# Patient Record
Sex: FEMALE | Race: WHITE | Employment: OTHER | URBAN - NONMETROPOLITAN AREA
[De-identification: names, ages, dates, MRNs, and addresses within clinical notes are randomized per-mention and may not be internally consistent; named-entity substitution may affect disease eponyms.]

---

## 2020-08-14 ENCOUNTER — OFFICE VISIT (OUTPATIENT)
Dept: INTERNAL MEDICINE CLINIC | Age: 30
End: 2020-08-14
Payer: COMMERCIAL

## 2020-08-14 VITALS
WEIGHT: 125 LBS | HEIGHT: 67 IN | BODY MASS INDEX: 19.62 KG/M2 | SYSTOLIC BLOOD PRESSURE: 139 MMHG | TEMPERATURE: 97.5 F | DIASTOLIC BLOOD PRESSURE: 85 MMHG | HEART RATE: 82 BPM | RESPIRATION RATE: 12 BRPM

## 2020-08-14 PROBLEM — F17.200 TOBACCO USE DISORDER: Status: ACTIVE | Noted: 2020-08-14

## 2020-08-14 LAB
AMPHETAMINE SCREEN, URINE: ABNORMAL
BARBITURATE SCREEN, URINE: ABNORMAL
BENZODIAZEPINE SCREEN, URINE: ABNORMAL
BUPRENORPHINE URINE: ABNORMAL
COCAINE METABOLITE SCREEN URINE: ABNORMAL
GABAPENTIN SCREEN, URINE: ABNORMAL
MDMA URINE: ABNORMAL
METHADONE SCREEN, URINE: ABNORMAL
METHAMPHETAMINE, URINE: ABNORMAL
OPIATE SCREEN URINE: ABNORMAL
OXYCODONE SCREEN URINE: ABNORMAL
PHENCYCLIDINE SCREEN URINE: ABNORMAL
PROPOXYPHENE SCREEN, URINE: ABNORMAL
THC SCREEN, URINE: ABNORMAL
TRICYCLIC ANTIDEPRESSANTS, UR: ABNORMAL

## 2020-08-14 PROCEDURE — G8427 DOCREV CUR MEDS BY ELIG CLIN: HCPCS | Performed by: INTERNAL MEDICINE

## 2020-08-14 PROCEDURE — 80305 DRUG TEST PRSMV DIR OPT OBS: CPT | Performed by: INTERNAL MEDICINE

## 2020-08-14 PROCEDURE — G8420 CALC BMI NORM PARAMETERS: HCPCS | Performed by: INTERNAL MEDICINE

## 2020-08-14 PROCEDURE — 99204 OFFICE O/P NEW MOD 45 MIN: CPT | Performed by: INTERNAL MEDICINE

## 2020-08-14 PROCEDURE — 4004F PT TOBACCO SCREEN RCVD TLK: CPT | Performed by: INTERNAL MEDICINE

## 2020-08-14 RX ORDER — BUPRENORPHINE AND NALOXONE 8; 2 MG/1; MG/1
1 FILM, SOLUBLE BUCCAL; SUBLINGUAL 2 TIMES DAILY
Qty: 14 FILM | Refills: 0 | Status: SHIPPED | OUTPATIENT
Start: 2020-08-14 | End: 2020-08-21

## 2020-08-14 RX ORDER — BUPRENORPHINE AND NALOXONE 8; 2 MG/1; MG/1
FILM, SOLUBLE BUCCAL; SUBLINGUAL
COMMUNITY
Start: 2020-08-05 | End: 2020-08-26 | Stop reason: SDUPTHER

## 2020-08-14 RX ORDER — CLONIDINE HYDROCHLORIDE 0.1 MG/1
0.1 TABLET ORAL 2 TIMES DAILY
COMMUNITY
End: 2020-09-22 | Stop reason: SDUPTHER

## 2020-08-14 ASSESSMENT — PATIENT HEALTH QUESTIONNAIRE - PHQ9
SUM OF ALL RESPONSES TO PHQ QUESTIONS 1-9: 0
2. FEELING DOWN, DEPRESSED OR HOPELESS: 0
1. LITTLE INTEREST OR PLEASURE IN DOING THINGS: 0
SUM OF ALL RESPONSES TO PHQ QUESTIONS 1-9: 0
SUM OF ALL RESPONSES TO PHQ9 QUESTIONS 1 & 2: 0

## 2020-08-14 NOTE — PROGRESS NOTES
death, feels its resolved. No suicidal issues. No Known Allergies    Current Outpatient Medications   Medication Sig Dispense Refill    buprenorphine-naloxone (SUBOXONE) 8-2 MG FILM SL film        No current facility-administered medications for this visit. Social History     Socioeconomic History    Marital status:       Spouse name: Not on file    Number of children: Not on file    Years of education: Not on file    Highest education level: Not on file   Occupational History    Not on file   Social Needs    Financial resource strain: Not on file    Food insecurity     Worry: Not on file     Inability: Not on file    Transportation needs     Medical: Not on file     Non-medical: Not on file   Tobacco Use    Smoking status: Current Every Day Smoker     Packs/day: 0.25     Years: 1.00     Pack years: 0.25     Types: Cigarettes    Smokeless tobacco: Never Used   Substance and Sexual Activity    Alcohol use: Not Currently    Drug use: Not Currently    Sexual activity: Not Currently   Lifestyle    Physical activity     Days per week: Not on file     Minutes per session: Not on file    Stress: Not on file   Relationships    Social connections     Talks on phone: Not on file     Gets together: Not on file     Attends Voodoo service: Not on file     Active member of club or organization: Not on file     Attends meetings of clubs or organizations: Not on file     Relationship status: Not on file    Intimate partner violence     Fear of current or ex partner: Not on file     Emotionally abused: Not on file     Physically abused: Not on file     Forced sexual activity: Not on file   Other Topics Concern    Not on file   Social History Narrative    Not on file          Family History   Problem Relation Age of Onset    Seizures Father     Emphysema Maternal Grandmother     Cancer Paternal Grandmother     Heart Attack Paternal Grandfather         ROS       General: Patient denies fevers, chills ,weight changes, sweats     Psych: No depression, anxiety, suicidal ideation or attempts     Endocrine: No thyroid issues,no neck pain, no galactorrhea, no weight changes     Pulmonary: No shortness of breath, orthopnea, PND     Cardiac: No chest pain,syncope, no history of cardiac issues     GI: No trouble with bowels, no abdominal pain     : No dysuria, nocturia, urgency, frequency     MS: Patient denies bone or joint aches, no myalgias     Neuro: Patient denies headaches, seizures, tremors     Skin: No skin lesions, rashes    PHYSICAL EXAM       /85 (Site: Right Upper Arm, Position: Sitting, Cuff Size: Medium Adult)   Pulse 82   Temp 97.5 °F (36.4 °C) (Temporal)   Resp 12   Ht 5' 7\" (1.702 m)   Wt 125 lb (56.7 kg)   BMI 19.58 kg/m²         COWS  3       General: Patient resting comfortably in no acute distress, mildly anxious      Mental status: Alert and oriented to person place and time, no hallucinations or delusions     Eyes: Pupils are normal in size     Neck: Supple no JVD or bruits     Pulmonary: Good respiratory effort no wheezes rales or rhonchi     Cardiac: Normal S1 normal S2 no murmurs gallops or rubs     Abdomen nondistended nontender no organomegaly      Extremities: No cyanosis clubbing or edema     Neurologic: No focal neurologic deficit detected, no tremors     Musculoskeletal: No abnormal joint findings or muscle findings     Skin: No rashes, lesions or abnormalities noted        URINE DRUG SCREEN TODAY:    No results for input(s): LABAMPH, LABBARB, LABBENZ, BUPRENUR, COCAIMETSCRU, GABAPENTIN, MDMA, METAMPU, LABMETH, OPIATESCREENURINE, OXTCOSU, PHENCYCLIDINESCREENURINE, PROPOXYPHENE, THCSCREENUR, TRICYUR in the last 72 hours. DIAGNOSIS:    1. Severe Opiod use disorder  Last intake of suboxone 2 days ago, had 4 tabs of adderal from the past ,   Claims started taking them as she was getting anxious. Recommend she stops using the same, and use suboxone.   Has some clonidine   Dose uncertain , uses prn only given  Physician at [facility]. We will continue Suboxone 8 mg film twice daily. She was encouraged to get counseling,   Caroline Fountain will update her on the various programs available in the community. 2. Tobacco use disorder - uses one pack every 4 days. PLAN:      Provider provided education on Medication Assisted Treatment options, including: Suboxone, Sublocade, Methadone, and Naltrexone/Vivitrol  Allowed opportunity to respond to questions regarding the treatment options. Patient voices that their treatment preference is suboxone. I feel that this patient is an appropriate candidate for this treatment option, with above recommendations to stop the Adderall. Education given on the importance of combining Medication Assisted Treatment with comprehensive treatment, including: individual counseling, treatment groups, community support groups, and psychiatry as applicable. Patient will meet with  to review clinic counseling expectations and to be linked to appropriate services. Provider reviewed medication contract with patient. Patient is agreeable to the program expectations. Both patient and provider signed the medication contract. Patient instructed to go to [facility] to watch a video and learn about [facility]. I told patient [facility] is an opioid antagonist that reverses respiratory depression caused by opioids. Pharmacy will give patient or family member [facility] and explain how to use in an emergency. I reviewed the [place] Automated Rx Reporting System report     There does not appear to be any discrepancies or overprescribing of controlled substances. Claims she got some blood work at [facility] at [address].  We will obtain those labs, never had history of hepatitis C    Return to office in 1 week, recheck urine studies at that time

## 2020-08-14 NOTE — PROGRESS NOTES
Per Verbal order of  for urine drug screen. Patient is positive for AMP,BUP. Verified with results with Tyrone Anderson LPN.

## 2020-08-17 ENCOUNTER — TELEPHONE (OUTPATIENT)
Dept: INTERNAL MEDICINE CLINIC | Age: 30
End: 2020-08-17

## 2020-08-17 NOTE — TELEPHONE ENCOUNTER
Patient called and stated she is unable to get her RX filled. Patient had a appt with Dr. Catarino Diaz on Friday 08 13 2020. I gave request to Rach to please call [address] in  [facility].

## 2020-08-26 ENCOUNTER — OFFICE VISIT (OUTPATIENT)
Dept: INTERNAL MEDICINE CLINIC | Age: 30
End: 2020-08-26
Payer: COMMERCIAL

## 2020-08-26 VITALS
HEIGHT: 67 IN | SYSTOLIC BLOOD PRESSURE: 130 MMHG | RESPIRATION RATE: 12 BRPM | TEMPERATURE: 98.2 F | DIASTOLIC BLOOD PRESSURE: 78 MMHG | BODY MASS INDEX: 19.21 KG/M2 | HEART RATE: 100 BPM | WEIGHT: 122.4 LBS

## 2020-08-26 PROCEDURE — G8420 CALC BMI NORM PARAMETERS: HCPCS | Performed by: INTERNAL MEDICINE

## 2020-08-26 PROCEDURE — G8427 DOCREV CUR MEDS BY ELIG CLIN: HCPCS | Performed by: INTERNAL MEDICINE

## 2020-08-26 PROCEDURE — 80305 DRUG TEST PRSMV DIR OPT OBS: CPT | Performed by: INTERNAL MEDICINE

## 2020-08-26 PROCEDURE — 99213 OFFICE O/P EST LOW 20 MIN: CPT | Performed by: INTERNAL MEDICINE

## 2020-08-26 PROCEDURE — 4004F PT TOBACCO SCREEN RCVD TLK: CPT | Performed by: INTERNAL MEDICINE

## 2020-08-26 RX ORDER — BUPRENORPHINE AND NALOXONE 8; 2 MG/1; MG/1
1 FILM, SOLUBLE BUCCAL; SUBLINGUAL 2 TIMES DAILY
Qty: 10 FILM | Refills: 0 | Status: SHIPPED | OUTPATIENT
Start: 2020-08-26 | End: 2020-08-31 | Stop reason: SDUPTHER

## 2020-08-26 NOTE — PROGRESS NOTES
[address] [facility] Internal Medicine   [facility] [address], [address]  Dept: [phone]  Dept Fax: [phone]    YOB: 1990    CHRONIC OPIOD DISORDER / NOW ON SUBOXONE F/U    26 y.o. female   here for follow-up of above issues. Was given on the first visit to this office, on the previous visit. She was in various programs, most recently at [facility]  As she lives in [place], she wanted to continue the program in the vicinity. Denies any withdrawals, not using any street drugs. Took   Last suboxone strip this morning, uses clonidine on a prn basis. 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 10 Film 0    cloNIDine (CATAPRES) 0.1 MG tablet Take 0.1 mg by mouth 2 times daily       No current facility-administered medications for this visit. Past Medical History:   Diagnosis Date    MRSA (methicillin resistant staph aureus) culture positive        Social History     Socioeconomic History    Marital status:       Spouse name: Not on file    Number of children: Not on file    Years of education: Not on file    Highest education level: Not on file   Occupational History    Not on file   Social Needs    Financial resource strain: Not on file    Food insecurity     Worry: Not on file     Inability: Not on file    Transportation needs     Medical: Not on file     Non-medical: Not on file   Tobacco Use    Smoking status: Current Every Day Smoker     Packs/day: 0.25     Years: 1.00     Pack years: 0.25     Types: Cigarettes    Smokeless tobacco: Never Used   Substance and Sexual Activity    Alcohol use: Not Currently    Drug use: Not Currently    Sexual activity: Not Currently   Lifestyle    Physical activity     Days per week: Not on file     Minutes per session: Not on file    Stress: Not on file   Relationships    Social connections     Talks on phone: Not on file     Gets together: Not on file     Attends Mormonism service: Not on file     Active member of club or organization: Not on file     Attends meetings of clubs or organizations: Not on file     Relationship status: Not on file    Intimate partner violence     Fear of current or ex partner: Not on file     Emotionally abused: Not on file     Physically abused: Not on file     Forced sexual activity: Not on file   Other Topics Concern    Not on file   Social History Narrative    Not on file       Family History   Problem Relation Age of Onset    Seizures Father     Emphysema Maternal Grandmother     Cancer Paternal Grandmother     Heart Attack Paternal Grandfather        No Known Allergies    Review of Systems     NO CP or SOB, and no active GI issues. No N/V or any diarrhea  Or abd cramps. /78 (Site: Right Upper Arm, Position: Sitting, Cuff Size: Medium Adult)   Pulse 100   Temp 98.2 °F (36.8 °C) (Temporal)   Resp 12   Ht 5' 7\" (1.702 m)   Wt 122 lb 6.4 oz (55.5 kg)   BMI 19.17 kg/m²      Physical Examination: General appearance - she is alert and oriented  Ambulated well. Diagnosis Orders   1. Severe opioid use disorder (HCC)  POCT Rapid Drug Screen    buprenorphine-naloxone (SUBOXONE) 8-2 MG FILM SL film   2. Tobacco use disorder         Orders Placed This Encounter   Procedures    POCT Rapid Drug Screen       Outpatient Encounter Medications as of 8/26/2020   Medication Sig Dispense Refill    buprenorphine-naloxone (SUBOXONE) 8-2 MG FILM SL film Place 1 Film under the tongue 2 times daily for 5 days. 10 Film 0    cloNIDine (CATAPRES) 0.1 MG tablet Take 0.1 mg by mouth 2 times daily      [DISCONTINUED] buprenorphine-naloxone (SUBOXONE) 8-2 MG FILM SL film        No facility-administered encounter medications on file as of 8/26/2020.          Controlled Substances Monitoring: Periodic Controlled Substance Monitoring: Possible medication side effects, risk of tolerance/dependence & alternative treatments discussed. (Yared Han MD)YES    Will schedule follow up appointment in 5 days. Plan:      Chronic opioid disorder:   She is taking adderal on a prn basis, urine tox is positive for BP and   Amphetamines, shared with the pt. I Will be seeing her twice a week, she prefers mondays and thursdays  So back next Monday on 8/31. Script for Suboxone film x 10 of 8 mg BID was given to her ( for 5 days ) . Our  has spoken with her. Signed by: Deisi Montalvo M.D.     [address] Internal Medicine  [address] [facility], [facility] [address], [address]    Tel  [phone]   Fax [phone]

## 2020-08-26 NOTE — PROGRESS NOTES
Per Verbal order of  for urine drug screen. Patient is positive for BUP,AMP. Verified with results with Ruddy Anderson LPN.

## 2020-08-31 ENCOUNTER — OFFICE VISIT (OUTPATIENT)
Dept: INTERNAL MEDICINE CLINIC | Age: 30
End: 2020-08-31
Payer: COMMERCIAL

## 2020-08-31 VITALS
BODY MASS INDEX: 19.93 KG/M2 | HEART RATE: 95 BPM | DIASTOLIC BLOOD PRESSURE: 68 MMHG | SYSTOLIC BLOOD PRESSURE: 128 MMHG | RESPIRATION RATE: 12 BRPM | TEMPERATURE: 97.5 F | WEIGHT: 127 LBS | HEIGHT: 67 IN

## 2020-08-31 PROCEDURE — 80305 DRUG TEST PRSMV DIR OPT OBS: CPT | Performed by: INTERNAL MEDICINE

## 2020-08-31 PROCEDURE — 4004F PT TOBACCO SCREEN RCVD TLK: CPT | Performed by: INTERNAL MEDICINE

## 2020-08-31 PROCEDURE — G8427 DOCREV CUR MEDS BY ELIG CLIN: HCPCS | Performed by: INTERNAL MEDICINE

## 2020-08-31 PROCEDURE — G8420 CALC BMI NORM PARAMETERS: HCPCS | Performed by: INTERNAL MEDICINE

## 2020-08-31 PROCEDURE — 99213 OFFICE O/P EST LOW 20 MIN: CPT | Performed by: INTERNAL MEDICINE

## 2020-08-31 RX ORDER — BUPRENORPHINE AND NALOXONE 8; 2 MG/1; MG/1
1 FILM, SOLUBLE BUCCAL; SUBLINGUAL 2 TIMES DAILY
Qty: 8 FILM | Refills: 0 | Status: SHIPPED | OUTPATIENT
Start: 2020-08-31 | End: 2020-09-04 | Stop reason: SDUPTHER

## 2020-08-31 NOTE — PROGRESS NOTES
2020     Nabeel Hutchins (:  1990) is a 26 y.o. female, here for evaluation of the following medical concerns: Follow-up on chronic opioid disorder/  Suboxone initiation  -2020    HPI    Howard Enrique on Suboxone 8 mg twice daily films  For further recommendations and close follow-up, been seeing her twice a week. Is under a lot of stress as her father was quite ill she had to make major decisions. Just had a trach apparently in ICU. Taking the meds tolerating it well, no cravings. Denies use of any street drugs, and no recent hospitalizations. She is a resident of [address] [place] [address], in the past seen various clinics at [facility]. Due to close proximity to her home , she elected to transfer to this office. Review of Systems     No recent abd pain or any CP or palpitations, mildly anxious at timed due to her fathers illness. Prior to Visit Medications    Medication Sig Taking? Authorizing Provider   buprenorphine-naloxone (SUBOXONE) 8-2 MG FILM SL film Place 1 Film under the tongue 2 times daily for 4 days. Yes Bautista Christianson MD   cloNIDine (CATAPRES) 0.1 MG tablet Take 0.1 mg by mouth 2 times daily Yes Historical Provider, MD        Social History     Tobacco Use    Smoking status: Current Every Day Smoker     Packs/day: 0.25     Years: 1.00     Pack years: 0.25     Types: Cigarettes    Smokeless tobacco: Never Used   Substance Use Topics    Alcohol use: Not Currently        Vitals:    20 1526   BP: 128/68   Site: Right Upper Arm   Position: Sitting   Cuff Size: Medium Adult   Pulse: 95   Resp: 12   Temp: 97.5 °F (36.4 °C)   TempSrc: Temporal   Weight: 127 lb (57.6 kg)   Height: 5' 7\" (1.702 m)     Estimated body mass index is 19.89 kg/m² as calculated from the following:    Height as of this encounter: 5' 7\" (1.702 m). Weight as of this encounter: 127 lb (57.6 kg).     Physical Exam     Pupils reacting to light, Neuro alert and oriented and ambulated well, cardiac normal S1 and S2, not tachy, resp no wheezing. ASSESSMENT/PLAN:     Diagnosis Orders   1. Severe opioid use disorder (HCC)  POCT Rapid Drug Screen    buprenorphine-naloxone (SUBOXONE) 8-2 MG FILM SL film   overall she is doing well on suboxone film 8 mg BID, has completed counseling some time, as this has been a chronic problem > 15 months. She is encouraged to keep up the good work and urine tox screen results noted and shared with her. PDMP was reviewed. RTO - this Friday , in 4 days. Return in about 4 days (around 9/4/2020) for this friday . An electronic signature was used to authenticate this note.     --Pranay Hitchcock MD on 8/31/2020 at 3:58 PM

## 2020-09-04 ENCOUNTER — OFFICE VISIT (OUTPATIENT)
Dept: INTERNAL MEDICINE CLINIC | Age: 30
End: 2020-09-04
Payer: COMMERCIAL

## 2020-09-04 VITALS
DIASTOLIC BLOOD PRESSURE: 80 MMHG | HEIGHT: 67 IN | TEMPERATURE: 97 F | SYSTOLIC BLOOD PRESSURE: 115 MMHG | BODY MASS INDEX: 19.46 KG/M2 | HEART RATE: 72 BPM | RESPIRATION RATE: 12 BRPM | WEIGHT: 124 LBS

## 2020-09-04 PROCEDURE — 4004F PT TOBACCO SCREEN RCVD TLK: CPT | Performed by: INTERNAL MEDICINE

## 2020-09-04 PROCEDURE — 99213 OFFICE O/P EST LOW 20 MIN: CPT | Performed by: INTERNAL MEDICINE

## 2020-09-04 PROCEDURE — G8420 CALC BMI NORM PARAMETERS: HCPCS | Performed by: INTERNAL MEDICINE

## 2020-09-04 PROCEDURE — 80305 DRUG TEST PRSMV DIR OPT OBS: CPT | Performed by: INTERNAL MEDICINE

## 2020-09-04 PROCEDURE — G8427 DOCREV CUR MEDS BY ELIG CLIN: HCPCS | Performed by: INTERNAL MEDICINE

## 2020-09-04 RX ORDER — BUPRENORPHINE AND NALOXONE 8; 2 MG/1; MG/1
1 FILM, SOLUBLE BUCCAL; SUBLINGUAL 2 TIMES DAILY
Qty: 8 FILM | Refills: 0 | Status: SHIPPED | OUTPATIENT
Start: 2020-09-04 | End: 2020-09-09 | Stop reason: SDUPTHER

## 2020-09-04 NOTE — PROGRESS NOTES
Per Verbal order Steffi for urine drug screen. Patient is positive for  BUP  . Verified with results with Yony Santacruz LPN.

## 2020-09-04 NOTE — PROGRESS NOTES
2020     Deo Stone (:  1990) is a 26 y.o. female, here for evaluation of the following medical concerns:    Chronic opiod  Use disorder / suboxone follow up    HPI     She is tolerating the suboxone well, no CP or SOB. Her father is in the hospital and underwent trach. , continues to smoke cigarettes. No recent  hospitalizations         Patient  tolerating suboxone     PATIENT does not  have cravings or withdrawal symptoms at this time     Reviewed patient drug screen Yes: Negative for drugs of abuse, positive for buprenorphine    Patient completed her counselng as she has been in various programs over last several years, recently at [facility], [address] [place] [address] START OF CARE 2020    [facility] report reviewed and d/w pt today    Review of Systems     No abd pain, and no palpitations,   Denies any resp issues, and no skin lesions. Prior to Visit Medications    Medication Sig Taking? Authorizing Provider   buprenorphine-naloxone (SUBOXONE) 8-2 MG FILM SL film Place 1 Film under the tongue 2 times daily for 4 days. Yes Isidro Huang MD   cloNIDine (CATAPRES) 0.1 MG tablet Take 0.1 mg by mouth 2 times daily Yes Historical Provider, MD        Social History     Tobacco Use    Smoking status: Current Every Day Smoker     Packs/day: 0.25     Years: 1.00     Pack years: 0.25     Types: Cigarettes    Smokeless tobacco: Never Used   Substance Use Topics    Alcohol use: Not Currently        Vitals:    20 1118   BP: 115/80   Site: Right Upper Arm   Position: Sitting   Cuff Size: Large Adult   Pulse: 72   Resp: 12   Temp: 97 °F (36.1 °C)   TempSrc: Temporal   Weight: 124 lb (56.2 kg)   Height: 5' 7\" (1.702 m)     Estimated body mass index is 19.42 kg/m² as calculated from the following:    Height as of this encounter: 5' 7\" (1.702 m). Weight as of this encounter: 124 lb (56.2 kg).     Physical Exam     She is alert and oriented x 3, abd soft NT, BS, Lungs CTA, Heart s1 s2 reg HS,  Neuro alert and oriented      ASSESSMENT/PLAN:     Diagnosis Orders   1. Severe opioid use disorder (HCC)  POCT Rapid Drug Screen    buprenorphine-naloxone (SUBOXONE) 8-2 MG FILM SL film     Overall she is doing well, I also touched based with her regarding smoking cessation , and consider switching to sublocade injections, from oral suboxone  8 mg film, BID. Refilled for 5 days due to labor holiday weekend, back on Wednesday and our staff will look into the insurance coverage, and other assistance programs. Pt claims she was never given this option regarding this monthly injection vs taking pills/ films. Return in about 5 days (around 9/9/2020) for wed appt. An electronic signature was used to authenticate this note.     --Kaylee Damon MD on 9/4/2020 at 3:38 PM

## 2020-09-09 ENCOUNTER — OFFICE VISIT (OUTPATIENT)
Dept: INTERNAL MEDICINE CLINIC | Age: 30
End: 2020-09-09
Payer: COMMERCIAL

## 2020-09-09 VITALS
RESPIRATION RATE: 12 BRPM | HEART RATE: 82 BPM | DIASTOLIC BLOOD PRESSURE: 75 MMHG | TEMPERATURE: 97.5 F | HEIGHT: 67 IN | SYSTOLIC BLOOD PRESSURE: 123 MMHG | BODY MASS INDEX: 19.62 KG/M2 | WEIGHT: 125 LBS

## 2020-09-09 PROCEDURE — 99213 OFFICE O/P EST LOW 20 MIN: CPT | Performed by: INTERNAL MEDICINE

## 2020-09-09 PROCEDURE — G8427 DOCREV CUR MEDS BY ELIG CLIN: HCPCS | Performed by: INTERNAL MEDICINE

## 2020-09-09 PROCEDURE — 80305 DRUG TEST PRSMV DIR OPT OBS: CPT | Performed by: INTERNAL MEDICINE

## 2020-09-09 PROCEDURE — G8420 CALC BMI NORM PARAMETERS: HCPCS | Performed by: INTERNAL MEDICINE

## 2020-09-09 PROCEDURE — 4004F PT TOBACCO SCREEN RCVD TLK: CPT | Performed by: INTERNAL MEDICINE

## 2020-09-09 RX ORDER — BUPRENORPHINE AND NALOXONE 8; 2 MG/1; MG/1
1 FILM, SOLUBLE BUCCAL; SUBLINGUAL 2 TIMES DAILY
Qty: 8 FILM | Refills: 0 | Status: SHIPPED | OUTPATIENT
Start: 2020-09-09 | End: 2020-09-14 | Stop reason: SDUPTHER

## 2020-09-09 NOTE — PROGRESS NOTES
Per Verbal order of  for urine drug screen. Patient is positive for  BUP,AMP  . Verified with results with John Connors CMA.
Heart s1 s2 reg HS, no   murmurs         ASSESSMENT/PLAN:     Diagnosis Orders   1. Severe opioid use disorder (HCC)  POCT Rapid Drug Screen    buprenorphine-naloxone (SUBOXONE) 8-2 MG FILM SL film     Overall she is doing well, I also touched based with her regarding smoking cessation , and consider switching to sublocade injections, from oral suboxone  8 mg film, BID.  and our staff will look into the insurance coverage, and other assistance programs. Pt claims she was never given this option regarding this monthly injection vs taking pills/ films. Return in about 5 days (around 9/14/2020) for monday am apt. An electronic signature was used to authenticate this note.     --Bautista Christianson MD on 9/9/2020 at 11:43 AM

## 2020-09-14 RX ORDER — BUPRENORPHINE AND NALOXONE 8; 2 MG/1; MG/1
1 FILM, SOLUBLE BUCCAL; SUBLINGUAL 2 TIMES DAILY
Qty: 2 FILM | Refills: 0 | OUTPATIENT
Start: 2020-09-14 | End: 2020-09-16 | Stop reason: SDUPTHER

## 2020-09-16 ENCOUNTER — OFFICE VISIT (OUTPATIENT)
Dept: INTERNAL MEDICINE CLINIC | Age: 30
End: 2020-09-16
Payer: COMMERCIAL

## 2020-09-16 VITALS
HEART RATE: 84 BPM | SYSTOLIC BLOOD PRESSURE: 126 MMHG | TEMPERATURE: 97.7 F | DIASTOLIC BLOOD PRESSURE: 82 MMHG | WEIGHT: 122 LBS | BODY MASS INDEX: 19.15 KG/M2 | HEIGHT: 67 IN

## 2020-09-16 PROCEDURE — 99213 OFFICE O/P EST LOW 20 MIN: CPT | Performed by: INTERNAL MEDICINE

## 2020-09-16 PROCEDURE — 4004F PT TOBACCO SCREEN RCVD TLK: CPT | Performed by: INTERNAL MEDICINE

## 2020-09-16 PROCEDURE — 80305 DRUG TEST PRSMV DIR OPT OBS: CPT | Performed by: INTERNAL MEDICINE

## 2020-09-16 PROCEDURE — G8420 CALC BMI NORM PARAMETERS: HCPCS | Performed by: INTERNAL MEDICINE

## 2020-09-16 PROCEDURE — G8427 DOCREV CUR MEDS BY ELIG CLIN: HCPCS | Performed by: INTERNAL MEDICINE

## 2020-09-16 RX ORDER — BUPRENORPHINE AND NALOXONE 8; 2 MG/1; MG/1
1 FILM, SOLUBLE BUCCAL; SUBLINGUAL 2 TIMES DAILY
Qty: 10 FILM | Refills: 0 | Status: SHIPPED | OUTPATIENT
Start: 2020-09-16 | End: 2020-09-21

## 2020-09-16 RX ORDER — BUPRENORPHINE AND NALOXONE 4; 1 MG/1; MG/1
1 FILM, SOLUBLE BUCCAL; SUBLINGUAL ONCE
Status: COMPLETED | OUTPATIENT
Start: 2020-09-16 | End: 2020-09-16

## 2020-09-16 RX ADMIN — BUPRENORPHINE AND NALOXONE 1 FILM: 4; 1 FILM, SOLUBLE BUCCAL; SUBLINGUAL at 16:08

## 2020-09-16 NOTE — PROGRESS NOTES
2020     Wang Kirby (:  1990) is a 26 y.o. female, here for evaluation of the following medical concerns:    Chronic opiod  Use disorder / suboxone follow up         She is tolerating the suboxone well, no CP or SOB. Her father WAS in the hospital after a  shunt, now in [place]. She ran out of med yesterday , took clonidine with some help . No LOC , fever or chills,  Complains of Headache, but no N/V     PATIENT does not  have cravings or withdrawal symptoms at this time ,   Denies any relapes. Reviewed patient drug screen Yes: Negative for drugs of abuse, positive for buprenorphine    Patient completed her counselng as she has been in various programs over last several years, recently at [facility] START OF CARE 2020    [facility] report reviewed and d/w pt today  Also had extensive Urine tox screen from [facility] , +ve for amphetamines, uses aderral old supply on a prn basis     Review of Systems     No abd pain, and no palpitations, but mild Headaches, w/o N/V and visual Problems, Denies any SOB, and no skin lesions. Ambulating well    Prior to Visit Medications    Medication Sig Taking? Authorizing Provider   buprenorphine-naloxone (SUBOXONE) 8-2 MG FILM SL film Place 1 Film under the tongue 2 times daily for 5 days.  Yes Davey Solis MD   cloNIDine (CATAPRES) 0.1 MG tablet Take 0.1 mg by mouth 2 times daily Yes Historical Provider, MD        Social History     Tobacco Use    Smoking status: Current Every Day Smoker     Packs/day: 0.25     Years: 1.00     Pack years: 0.25     Types: Cigarettes    Smokeless tobacco: Never Used   Substance Use Topics    Alcohol use: Not Currently        Vitals:    20 1538   BP: 126/82   Site: Right Upper Arm   Position: Sitting   Cuff Size: Large Adult   Pulse: 84   Temp: 97.7 °F (36.5 °C)   TempSrc: Temporal   Weight: 122 lb (55.3 kg)   Height: 5' 7\" (1.702 m)     Estimated body mass index is 19.11 kg/m² as calculated from the following:    Height as of this encounter: 5' 7\" (1.702 m). Weight as of this encounter: 122 lb (55.3 kg). Physical Exam     She is alert and oriented x 3, abd soft NT, BS, Lungs CTA, Heart s1 s2 reg HS, pupils reacting to light. ASSESSMENT/PLAN:     Diagnosis Orders   1. Severe opioid use disorder (HCC)  POCT Rapid Drug Screen    buprenorphine-naloxone (SUBOXONE) 8-2 MG FILM SL film    buprenorphine-naloxone (SUBOXONE) 4-1 MG SL film 1 Film   2. Tobacco use     Overall she is doing well, I also touched based with her regarding smoking cessation , and consider switching to sublocade injections, from oral suboxone 8 mg film, BID.  and our staff will look into the insurance coverage, and other assistance programs. One dose of suboxone 4 mg film was administered today in the   Clinic , and she is encouraged to take her 8 mg later tonight. Return in about 5 days (around 9/21/2020). An electronic signature was used to authenticate this note.     --Quincy Alas MD on 9/16/2020 at 4:45 PM

## 2020-09-16 NOTE — PROGRESS NOTES
Verbal order per Dr. Evonne Patten for urine drug screen. Positive for AMP,BUP. Verified results with Nikolas Martinez.

## 2020-09-21 ENCOUNTER — TELEPHONE (OUTPATIENT)
Dept: INTERNAL MEDICINE CLINIC | Age: 30
End: 2020-09-21

## 2020-09-21 NOTE — TELEPHONE ENCOUNTER
Patient called and left a message stating she wants to cancel appt for 09 21 2020-today and reschedule for Tuesday 09 22 2020. I called patient back at [phone] answer and voicemail full.

## 2020-09-22 ENCOUNTER — OFFICE VISIT (OUTPATIENT)
Dept: INTERNAL MEDICINE CLINIC | Age: 30
End: 2020-09-22
Payer: COMMERCIAL

## 2020-09-22 VITALS
HEIGHT: 67 IN | WEIGHT: 121 LBS | TEMPERATURE: 97.9 F | HEART RATE: 103 BPM | SYSTOLIC BLOOD PRESSURE: 117 MMHG | BODY MASS INDEX: 18.99 KG/M2 | DIASTOLIC BLOOD PRESSURE: 56 MMHG

## 2020-09-22 PROCEDURE — 80305 DRUG TEST PRSMV DIR OPT OBS: CPT | Performed by: INTERNAL MEDICINE

## 2020-09-22 PROCEDURE — 4004F PT TOBACCO SCREEN RCVD TLK: CPT | Performed by: INTERNAL MEDICINE

## 2020-09-22 PROCEDURE — G8420 CALC BMI NORM PARAMETERS: HCPCS | Performed by: INTERNAL MEDICINE

## 2020-09-22 PROCEDURE — G8427 DOCREV CUR MEDS BY ELIG CLIN: HCPCS | Performed by: INTERNAL MEDICINE

## 2020-09-22 PROCEDURE — 99213 OFFICE O/P EST LOW 20 MIN: CPT | Performed by: INTERNAL MEDICINE

## 2020-09-22 RX ORDER — BUPRENORPHINE AND NALOXONE 8; 2 MG/1; MG/1
1 FILM, SOLUBLE BUCCAL; SUBLINGUAL 2 TIMES DAILY
Qty: 8 FILM | Refills: 0 | Status: SHIPPED | OUTPATIENT
Start: 2020-09-22 | End: 2020-09-29 | Stop reason: SDUPTHER

## 2020-09-22 RX ORDER — CLONIDINE HYDROCHLORIDE 0.1 MG/1
0.1 TABLET ORAL 2 TIMES DAILY PRN
Qty: 60 TABLET | Refills: 3 | Status: SHIPPED | OUTPATIENT
Start: 2020-09-22 | End: 2020-12-11 | Stop reason: SDUPTHER

## 2020-09-22 RX ORDER — BUPRENORPHINE AND NALOXONE 8; 2 MG/1; MG/1
1 FILM, SOLUBLE BUCCAL; SUBLINGUAL 2 TIMES DAILY
COMMUNITY
End: 2020-09-22 | Stop reason: SDUPTHER

## 2020-09-22 NOTE — PROGRESS NOTES
Verbal order per Dr. Peter Cassidy for urine drug screen. Positive for AMP,BUP. Verified results with Grey Galindo.       2020     Alise Ríos (:  1990) is a 26 y.o. female, here for evaluation of the following medical concerns:    Chronic opiod  Use disorder / suboxone follow up         Susan Clancy  is tolerating the suboxone well, no CP or SOB. Her father WAS in the hospital after a  shunt, now in [place]. She had mild URI symptoms , Now getting better, no fever or chills, mild sore throat, No active GI symptoms, able to tolerate PO intake  w/o any problems. PATIENT does not  have cravings or withdrawal symptoms at this time ,   Denies any relapes. Reviewed patient drug screen Yes: Negative for drugs of abuse, positive for buprenorphine    Patient completed her counselng as she has been in various programs over last several years, recently at [facility] START OF CARE 2020    OARRS report reviewed and d/w pt today , risk score of 520   Also had extensive last Urine tox screen from [facility] , +ve for amphetamines, uses aderral old supply on a prn basis     Review of Systems     No abd pain, and no palpitations, but mild Headaches, w/o N/V and visual Problems, Denies any SOB, and no skin lesions. Had mild URI symptoms , improving. Denies fever or chills. Prior to Visit Medications    Medication Sig Taking? Authorizing Provider   buprenorphine-naloxone (SUBOXONE) 8-2 MG FILM SL film Place 1 Film under the tongue 2 times daily for 4 days.  Yes Jagdish Madrigal MD   cloNIDine (CATAPRES) 0.1 MG tablet Take 1 tablet by mouth 2 times daily as needed for High Blood Pressure Yes Jagdish Madrigal MD        Social History     Tobacco Use    Smoking status: Current Every Day Smoker     Packs/day: 0.25     Years: 1.00     Pack years: 0.25     Types: Cigarettes    Smokeless tobacco: Never Used   Substance Use Topics    Alcohol use: Not Currently        Vitals:    20 1545   BP: (!) 117/56   Site: Right Upper Arm   Cuff Size: Small Adult   Pulse: 103   Temp: 97.9 °F (36.6 °C)   TempSrc: Temporal   Weight: 121 lb (54.9 kg)   Height: 5' 7\" (1.702 m)     Estimated body mass index is 18.95 kg/m² as calculated from the following:    Height as of this encounter: 5' 7\" (1.702 m). Weight as of this encounter: 121 lb (54.9 kg). Physical Exam     She is alert and oriented x 3, abd soft NT, BS, Lungs CTA, Heart s1 s2 reg HS , Neck no JVD or any lymphadenopathy          ASSESSMENT/PLAN:     Diagnosis Orders   1. Severe opioid use disorder (HCC)  POCT Rapid Drug Screen    buprenorphine-naloxone (SUBOXONE) 8-2 MG FILM SL film   [facility] , another facility in [facility] before she started coming over to our office. So we will get labs from them, if not done hep B and C will be needed along with pregnancy test. Etc.     2. Tobacco use     Overall she is doing well, I also touched based with her regarding smoking cessation , and now she is interested in sublocade injections, from oral suboxone 8 mg film, BID.  and our staff will look into the insurance coverage, and other assistance programs. suboxone 8 mg film BID # 8 , script was given today . Return in about 3 days (around 9/25/2020). An electronic signature was used to authenticate this note.     --Reid Zelaya MD on 9/22/2020 at 4:27 PM

## 2020-09-29 ENCOUNTER — OFFICE VISIT (OUTPATIENT)
Dept: INTERNAL MEDICINE CLINIC | Age: 30
End: 2020-09-29
Payer: COMMERCIAL

## 2020-09-29 VITALS
DIASTOLIC BLOOD PRESSURE: 67 MMHG | HEIGHT: 67 IN | WEIGHT: 123.8 LBS | BODY MASS INDEX: 19.43 KG/M2 | SYSTOLIC BLOOD PRESSURE: 133 MMHG | TEMPERATURE: 97.9 F | HEART RATE: 97 BPM | RESPIRATION RATE: 12 BRPM

## 2020-09-29 LAB
ALCOHOL URINE: ABNORMAL
AMPHETAMINE SCREEN, URINE: ABNORMAL
BARBITURATE SCREEN, URINE: ABNORMAL
BENZODIAZEPINE SCREEN, URINE: ABNORMAL
BUPRENORPHINE URINE: ABNORMAL
COCAINE METABOLITE SCREEN URINE: ABNORMAL
FENTANYL SCREEN, URINE: ABNORMAL
GABAPENTIN SCREEN, URINE: ABNORMAL
MDMA URINE: ABNORMAL
METHADONE SCREEN, URINE: ABNORMAL
METHAMPHETAMINE, URINE: ABNORMAL
OPIATE SCREEN URINE: ABNORMAL
OXYCODONE SCREEN URINE: ABNORMAL
PHENCYCLIDINE SCREEN URINE: ABNORMAL
PROPOXYPHENE SCREEN, URINE: ABNORMAL
SYNTHETIC CANNABINOIDS(K2) SCREEN, URINE: ABNORMAL
THC SCREEN, URINE: ABNORMAL
TRAMADOL SCREEN URINE: ABNORMAL
TRICYCLIC ANTIDEPRESSANTS, UR: ABNORMAL

## 2020-09-29 PROCEDURE — G8427 DOCREV CUR MEDS BY ELIG CLIN: HCPCS | Performed by: INTERNAL MEDICINE

## 2020-09-29 PROCEDURE — G8420 CALC BMI NORM PARAMETERS: HCPCS | Performed by: INTERNAL MEDICINE

## 2020-09-29 PROCEDURE — 99213 OFFICE O/P EST LOW 20 MIN: CPT | Performed by: INTERNAL MEDICINE

## 2020-09-29 PROCEDURE — 80305 DRUG TEST PRSMV DIR OPT OBS: CPT | Performed by: INTERNAL MEDICINE

## 2020-09-29 PROCEDURE — 4004F PT TOBACCO SCREEN RCVD TLK: CPT | Performed by: INTERNAL MEDICINE

## 2020-09-29 RX ORDER — BUPRENORPHINE AND NALOXONE 8; 2 MG/1; MG/1
1 FILM, SOLUBLE BUCCAL; SUBLINGUAL 2 TIMES DAILY
Qty: 14 FILM | Refills: 0 | Status: SHIPPED | OUTPATIENT
Start: 2020-09-29 | End: 2020-10-07 | Stop reason: SDUPTHER

## 2020-09-29 NOTE — PROGRESS NOTES
Per Verbal order of  for urine drug screen. Patient is positive for  BUP only  . Verified with results with Yuly Anderson LPN.

## 2020-09-29 NOTE — PROGRESS NOTES
Verbal order per Dr. Precious Soto for urine drug screen. Positive for AMP,BUP. Verified results with Bernie Cogan.       2020     Marleni Sanford (:  1990) is a 26 y.o. female, here for evaluation of the following medical concerns:      Chronic opiod  Use disorder / suboxone follow up       Ana Boateng  is tolerating the suboxone well, no CP or SOB. She had low grade fever, mild N/V over the weekend, resolved. Mild HA , eating better here with her daughter who is 10 yrs old. No recent  COVID testing. No recent diarrhea. Recent office appointments have been   Rescheduled due to URI symptoms as outlined above. Tonye Cogan PATIENT does not  have cravings or withdrawal symptoms at this time ,   Denies any relapes. Reviewed patient drug screen Yes: Negative for drugs of abuse, positive for buprenorphine    Patient completed her counselng as she has been in various programs over last several years, recently at [facility] CARE 2020    OARRS report reviewed and d/w pt today , risk score of 520   Also had extensive last Urine tox screen from [facility] , +ve for amphetamines, uses aderral old supply on a prn basis     Review of Systems     No abd pain, and no palpitations, but mild Headaches, w/o N/V and visual Problems, Denies any SOB, and no skin lesions. Resolved fever  No diarrhea    Prior to Visit Medications    Medication Sig Taking? Authorizing Provider   buprenorphine-naloxone (SUBOXONE) 8-2 MG FILM SL film Place 1 Film under the tongue 2 times daily for 7 days.  Yes Msiha Jara MD   cloNIDine (CATAPRES) 0.1 MG tablet Take 1 tablet by mouth 2 times daily as needed for High Blood Pressure Yes Misha Jara MD        Social History     Tobacco Use    Smoking status: Current Every Day Smoker     Packs/day: 0.25     Years: 1.00     Pack years: 0.25     Types: Cigarettes    Smokeless tobacco: Never Used   Substance Use Topics    Alcohol use: Not Currently        Vitals:    20 1557   BP: 133/67   Site: Right Upper Arm   Position: Sitting   Cuff Size: Medium Adult   Pulse: 97   Resp: 12   Temp: 97.9 °F (36.6 °C)   TempSrc: Temporal   Weight: 123 lb 12.8 oz (56.2 kg)   Height: 5' 7\" (1.702 m)     Estimated body mass index is 19.39 kg/m² as calculated from the following:    Height as of this encounter: 5' 7\" (1.702 m). Weight as of this encounter: 123 lb 12.8 oz (56.2 kg). Physical Exam     She is alert and oriented x 3, abd soft NT, BS, Lungs CTA, Heart s1 s2 reg HS , Neck no JVD or any lymphadenopathy , oral cavity no lesions. Not in acute distress          ASSESSMENT/PLAN:     Diagnosis Orders   1. Severe opioid use disorder (HCC)  POCT Rapid Drug Screen    buprenorphine-naloxone (SUBOXONE) 8-2 MG FILM SL film   2. Upper respiratory tract infection, unspecified type  COVID-19 Ambulatory   she had some labs done with [facility] , another facility in [facility]  for suboxone before she started coming over to our office. So we will get labs from them, if not done hep B and C will be needed along with pregnancy test. Etc.       2. Tobacco use     Overall she is doing well, I also touched based with her regarding smoking cessation , and now she is interested in sublocade injections, from oral suboxone 8 mg film, BID.  and our staff will look into the insurance coverage, and other assistance programs. 3. URI symptoms - will check for  COVID, likely viral .   No CXR as lungs are CTA bilaterally. She also plans on seeing  Her PCP in few days. She will be seen here on a weekly basis, as currently she is on oral suboxone. .     suboxone 8 mg film BID # 14 , script was given today . GOAL:  To enhance patient recovery through the use of medication assisted treatment to improve overall quality of life. OBJECTIVE:  Patient will abstain from the use of mood altering substances 7 out of 7 days per week.       INTERVENTION:  Patient will need to transitioned to  [facility] injections , as she has a 10 yr old daughter at home, and she lives far away from our office, and has been on suboxone for > one year and was accepted as a transfer from another program [facility]. Patient will continue current treatment regiment as outlined and treatment plan will be reviewed at each visit. Overall transitioning to sublocade due to above conditions mentioned, will improve compliance and she is encouraged to continue an active and clean life style. Due to her length of time in treatment programs , counseling is not being recommended   At this time. Drug screen results as above      Return in about 1 week (around 10/6/2020). An electronic signature was used to authenticate this note.     --Davey Solis MD on 9/29/2020 at 4:35 PM

## 2020-10-06 RX ORDER — BUPRENORPHINE 300 MG/1
300 SOLUTION SUBCUTANEOUS
Qty: 1 SYRINGE | Refills: 1 | Status: SHIPPED | OUTPATIENT
Start: 2020-10-06 | End: 2020-11-05

## 2020-10-07 ENCOUNTER — NURSE ONLY (OUTPATIENT)
Dept: INTERNAL MEDICINE CLINIC | Age: 30
End: 2020-10-07
Payer: COMMERCIAL

## 2020-10-07 PROCEDURE — 80305 DRUG TEST PRSMV DIR OPT OBS: CPT | Performed by: INTERNAL MEDICINE

## 2020-10-07 RX ORDER — BUPRENORPHINE AND NALOXONE 8; 2 MG/1; MG/1
1 FILM, SOLUBLE BUCCAL; SUBLINGUAL 2 TIMES DAILY
Qty: 14 FILM | Refills: 0 | OUTPATIENT
Start: 2020-10-07 | End: 2020-10-15 | Stop reason: SDUPTHER

## 2020-10-07 NOTE — PROGRESS NOTES
Per Verbal order of  for urine drug screen. Patient is positive for  Yes: Negative for drugs of abuse, positive for buprenorphine  . Verified with results with [facility] Day LPN.

## 2020-10-15 ENCOUNTER — OFFICE VISIT (OUTPATIENT)
Dept: INTERNAL MEDICINE CLINIC | Age: 30
End: 2020-10-15
Payer: COMMERCIAL

## 2020-10-15 ENCOUNTER — TELEPHONE (OUTPATIENT)
Dept: INTERNAL MEDICINE CLINIC | Age: 30
End: 2020-10-15

## 2020-10-15 VITALS
TEMPERATURE: 97.5 F | WEIGHT: 123 LBS | BODY MASS INDEX: 19.3 KG/M2 | DIASTOLIC BLOOD PRESSURE: 67 MMHG | RESPIRATION RATE: 12 BRPM | HEIGHT: 67 IN | HEART RATE: 70 BPM | SYSTOLIC BLOOD PRESSURE: 133 MMHG

## 2020-10-15 PROCEDURE — 99213 OFFICE O/P EST LOW 20 MIN: CPT | Performed by: INTERNAL MEDICINE

## 2020-10-15 PROCEDURE — G8484 FLU IMMUNIZE NO ADMIN: HCPCS | Performed by: INTERNAL MEDICINE

## 2020-10-15 PROCEDURE — G8427 DOCREV CUR MEDS BY ELIG CLIN: HCPCS | Performed by: INTERNAL MEDICINE

## 2020-10-15 PROCEDURE — 80305 DRUG TEST PRSMV DIR OPT OBS: CPT | Performed by: INTERNAL MEDICINE

## 2020-10-15 PROCEDURE — G8420 CALC BMI NORM PARAMETERS: HCPCS | Performed by: INTERNAL MEDICINE

## 2020-10-15 PROCEDURE — 4004F PT TOBACCO SCREEN RCVD TLK: CPT | Performed by: INTERNAL MEDICINE

## 2020-10-15 RX ORDER — BUPRENORPHINE AND NALOXONE 8; 2 MG/1; MG/1
1 FILM, SOLUBLE BUCCAL; SUBLINGUAL 2 TIMES DAILY
Qty: 14 FILM | Refills: 0 | Status: SHIPPED | OUTPATIENT
Start: 2020-10-15 | End: 2020-10-22

## 2020-10-15 NOTE — PROGRESS NOTES
Verbal order per Dr. Alejandro Nobles for urine drug screen. Positive for AMP,BUP. Verified results with Eliceo Canales. 10/15/2020     Alex Felix (:  1990) is a 26 y.o. female, here for evaluation of the following medical concerns:      Chronic opiod  Use disorder / suboxone follow up       Lizbeth Reese  is tolerating the suboxone well, no CP or SOB. She had low grade fever, mild N/V over the weekend, resolved. She came in late for the appointment today > 40 minutes. Her father in the ER, at [facility]. Here with her daughter. PATIENT does not  have cravings or withdrawal symptoms at this time ,   Denies any relapes. Reviewed patient drug screen Yes: Negative for drugs of abuse, positive for buprenorphine    Patient completed her counselng as she has been in various programs over last several years, recently at [facility] START OF CARE 2020    OARRS report reviewed and d/w pt today , risk score of 520   Also had extensive last Urine tox screen from [facility] , +ve for amphetamines, uses aderral old supply on a prn basis     Review of Systems     No abd pain, and no palpitations, but mild Headaches, w/o N/V and visual Problems, Denies any SOB, and no skin lesions. Prior to Visit Medications    Medication Sig Taking? Authorizing Provider   buprenorphine-naloxone (SUBOXONE) 8-2 MG FILM SL film Place 1 Film under the tongue 2 times daily for 7 days. Yes April Tran MD   buprenorphine er (SUBLOCADE) 300 MG/1.5ML SOSY injection Inject 1.5 mLs into the skin every 30 days for 30 days.  Yes April Tran MD   cloNIDine (CATAPRES) 0.1 MG tablet Take 1 tablet by mouth 2 times daily as needed for High Blood Pressure Yes April Tran MD        Social History     Tobacco Use    Smoking status: Current Every Day Smoker     Packs/day: 0.25     Years: 1.00     Pack years: 0.25     Types: Cigarettes    Smokeless tobacco: Never Used   Substance Use Topics    Alcohol use: Not Currently        Vitals:    10/15/20 1555   BP: 133/67   Site: Right Upper Arm   Position: Sitting   Cuff Size: Large Adult   Pulse: 70   Resp: 12   Temp: 97.5 °F (36.4 °C)   TempSrc: Temporal   Weight: 123 lb (55.8 kg)   Height: 5' 7\" (1.702 m)     Estimated body mass index is 19.26 kg/m² as calculated from the following:    Height as of this encounter: 5' 7\" (1.702 m). Weight as of this encounter: 123 lb (55.8 kg). Physical Exam     She is alert and oriented x 3, abd soft NT, BS, Lungs CTA, Heart s1 s2 reg HS , Neck no JVD or any lymphadenopathy   ABRIL         ASSESSMENT/PLAN:     Diagnosis Orders   1. Severe opioid use disorder (HCC)  POCT Rapid Drug Screen    buprenorphine-naloxone (SUBOXONE) 8-2 MG FILM SL film    Basic Metabolic Panel    Hepatic Function Panel    Hepatitis C Antibody     She needs needs labs hep and LFT along with BMP, as she did not have them at the other place as she thought. So spoke with her today and she will get the labs soon. She will call the speciality pharmacy and approve so that she can start the sublocade injections when we get the delivery. 2. Tobacco use     Overall she is doing well, I also touched based with her regarding smoking cessation , and now she is interested in sublocade injections, from oral suboxone 8 mg film, BID.  and our staff will look into the insurance coverage, and other assistance programs. She will be seen here on a weekly basis, as currently she is on oral suboxone. .     suboxone 8 mg film BID # 14 , script was given today . GOAL:  To enhance patient recovery through the use of medication assisted treatment to improve overall quality of life. OBJECTIVE:  Patient will abstain from the use of mood altering substances 7 out of 7 days per week.       INTERVENTION:  Patient will need to transitioned to  Sublocade injections , as she has a 10 yr old daughter at home, and she lives far away from our office, and has been on suboxone for > one year and was accepted as a transfer from another program [facility]. Patient will continue current treatment regiment as outlined and treatment plan will be reviewed at each visit. Overall transitioning to sublocade due to above conditions mentioned, will improve compliance and she is encouraged to continue an active and clean life style. Due to her length of time in treatment programs , counseling is not being recommended   At this time. Drug screen results as above    RTO in one week. An electronic signature was used to authenticate this note.     --Nicolle Gallardo MD on 10/15/2020 at 4:11 PM

## 2020-10-15 NOTE — TELEPHONE ENCOUNTER
[facility]- 9   Ext [id number]- Lizzy Gamble 300mg medication is ready to ship as soon as they get the patient's consent. They have to tried to contact patient and left messages. Patient has a appointment today. I will give [facility] Phone number .

## 2020-10-15 NOTE — PROGRESS NOTES
Per Verbal order of  for urine drug screen. Patient is positive for  BUP,TRA  . [facility]

## 2020-10-21 ENCOUNTER — TELEPHONE (OUTPATIENT)
Dept: INTERNAL MEDICINE CLINIC | Age: 30
End: 2020-10-21

## 2020-10-21 NOTE — TELEPHONE ENCOUNTER
[facility]- -Bill- they have tried numerous times to contact the patient for her consent to ship Sublocade medication to our office with out success. I have also given her the phone number to [facility] numerous times.

## 2020-10-26 ENCOUNTER — OFFICE VISIT (OUTPATIENT)
Dept: INTERNAL MEDICINE CLINIC | Age: 30
End: 2020-10-26
Payer: COMMERCIAL

## 2020-10-26 VITALS
HEART RATE: 73 BPM | TEMPERATURE: 97.9 F | DIASTOLIC BLOOD PRESSURE: 62 MMHG | RESPIRATION RATE: 12 BRPM | HEIGHT: 67 IN | BODY MASS INDEX: 19.46 KG/M2 | SYSTOLIC BLOOD PRESSURE: 133 MMHG | WEIGHT: 124 LBS

## 2020-10-26 PROCEDURE — 4004F PT TOBACCO SCREEN RCVD TLK: CPT | Performed by: INTERNAL MEDICINE

## 2020-10-26 PROCEDURE — 99213 OFFICE O/P EST LOW 20 MIN: CPT | Performed by: INTERNAL MEDICINE

## 2020-10-26 PROCEDURE — G8484 FLU IMMUNIZE NO ADMIN: HCPCS | Performed by: INTERNAL MEDICINE

## 2020-10-26 PROCEDURE — G8427 DOCREV CUR MEDS BY ELIG CLIN: HCPCS | Performed by: INTERNAL MEDICINE

## 2020-10-26 PROCEDURE — 80305 DRUG TEST PRSMV DIR OPT OBS: CPT | Performed by: INTERNAL MEDICINE

## 2020-10-26 PROCEDURE — G8420 CALC BMI NORM PARAMETERS: HCPCS | Performed by: INTERNAL MEDICINE

## 2020-10-26 NOTE — PROGRESS NOTES
Per Verbal order of  for urine drug screen. Patient is positive for  BUP,TRA  . Verified with results with Telma Anderson LPN.

## 2020-10-26 NOTE — PROGRESS NOTES
10/26/2020     Muriel Hodgkins (:  1990) is a 26 y.o. female, here for evaluation of the following medical concerns:      Chronic opiod  Use disorder / suboxone follow up       Paola Quijano  is tolerating the suboxone well, no CP or SOB. She is ready for the switch , to sublocade shots. Last time  Intake of suboxone yesterday PM.   PATIENT does not  have cravings or withdrawal symptoms at this time ,   Denies any relapes. Reviewed patient drug screen Yes: Negative for drugs of abuse, positive for buprenorphine    Patient completed her counselng as she has been in various programs over last several years, recently at [facility] START OF CARE 2020    OARRS report reviewed and d/w pt today , risk score of 560  Also had extensive last Urine tox screen from [facility] , +ve for amphetamines, uses aderral old supply on a prn basis     Review of Systems     No abd pain, and no palpitations, but mild Headaches, w/o N/V and visual Problems, Denies any SOB, and no skin lesions. Prior to Visit Medications    Medication Sig Taking? Authorizing Provider   buprenorphine er (SUBLOCADE) 300 MG/1.5ML SOSY injection Inject 1.5 mLs into the skin every 30 days for 30 days.  Yes Ayan Rodriguez MD   cloNIDine (CATAPRES) 0.1 MG tablet Take 1 tablet by mouth 2 times daily as needed for High Blood Pressure Yes Yared Han MD        Social History     Tobacco Use    Smoking status: Current Every Day Smoker     Packs/day: 0.25     Years: 1.00     Pack years: 0.25     Types: Cigarettes    Smokeless tobacco: Never Used   Substance Use Topics    Alcohol use: Not Currently        Vitals:    10/26/20 1044   BP: 133/62   Site: Right Upper Arm   Position: Sitting   Cuff Size: Large Adult   Pulse: 73   Resp: 12   Temp: 97.9 °F (36.6 °C)   TempSrc: Temporal   Weight: 124 lb (56.2 kg)   Height: 5' 7\" (1.702 m)     Estimated body mass index is 19.42 kg/m² as calculated from the following:    Height as of this encounter: 5' 7\" (1.702 m). Weight as of this encounter: 124 lb (56.2 kg). Physical Exam     Pupils normal size and reacting to light,  She is alert and oriented x 3, abd soft NT, BS, Lungs CTA, Heart s1 s2 reg HS,  Neck no JVD             ASSESSMENT/PLAN:     Diagnosis Orders   1. Severe opioid use disorder ([address]  POCT Rapid Drug Screen     She needs needs labs hep and LFT along with BMP, as she did not have them at the other place as she thought. Again I spoke with her today and she will get the labs today , claims the lab slips are in her car. Transition to sublocade   Shots today , covered by her insurance plan and pt consented. 2. Tobacco use         She will be seen here on a weekly basis, as currently she is on oral suboxone. Agnieszka Mackey GOAL:  To enhance patient recovery through the use of medication assisted treatment to improve overall quality of life. OBJECTIVE:  Patient will abstain from the use of mood altering substances 7 out of 7 days per week. INTERVENTION:  Patient was  transitioned to  Sublocade injections , as she has a 10 yr old daughter at home, and she lives far away from our office, and has been on suboxone for > one year and was accepted as a transfer from another program [facility]. Patient will continue current treatment regiment as outlined and treatment plan will be reviewed at each visit. Overall transitioning to sublocade due to above conditions mentioned, will improve compliance and she is encouraged to continue an active and clean life style. FIRST SHOT OF SUBLOCADE 300 mg given today , will be re evaluated in the office in 2 weeks x 2 then if she tolerates them well , transition to 4 week visits. Drug screen results as above    RTO in two  Weeks. An electronic signature was used to authenticate this note.     --Kiara Guerrero MD on 10/26/2020 at 11:47 AM

## 2020-11-03 ENCOUNTER — TELEPHONE (OUTPATIENT)
Dept: INTERNAL MEDICINE CLINIC | Age: 30
End: 2020-11-03

## 2020-11-03 NOTE — TELEPHONE ENCOUNTER
Pt called and stated she is having some side effects from the sublocade shot. She has been feeling weak and tired since Saturday and sweating on and off.  Please advise

## 2020-11-03 NOTE — TELEPHONE ENCOUNTER
I just  called and spoke with her, she feels tired. Non specific symptoms,   No fever or chills, denies any SOB or active GI symptoms. Advised to drink plenty of fluids, and take tylenol. And she will call us in 48 hrs with an update.

## 2020-11-05 ENCOUNTER — TELEPHONE (OUTPATIENT)
Dept: INTERNAL MEDICINE CLINIC | Age: 30
End: 2020-11-05

## 2020-11-05 RX ORDER — BUPRENORPHINE AND NALOXONE 4; 1 MG/1; MG/1
FILM, SOLUBLE BUCCAL; SUBLINGUAL
Qty: 2 FILM | Refills: 0 | Status: SHIPPED | OUTPATIENT
Start: 2020-11-05 | End: 2020-11-09

## 2020-11-05 NOTE — TELEPHONE ENCOUNTER
Pt called stated she is still feeling weak and is having stomach cramps.  She feels like she is going into withdrawal. please advise

## 2020-11-05 NOTE — TELEPHONE ENCOUNTER
I called and spoke with her, due to withdrawals,  Have prescribed lower dose of   suboxone 4 mg to be used on prn basis , # 4 only. As mentioned to her as  She received the first shot of sublocade  Recently she might be having some   Psychological issues, not taking oral meds etc. SO she was encouraged to above  And see us on Monday as scheduled, and call us if needed in the interim.

## 2020-11-05 NOTE — TELEPHONE ENCOUNTER
Patient called and requested to talk to Dr. Teto Mendoza or nurse. I gave request to Rach REHMAN To please call the patient back.

## 2020-11-07 RX ORDER — BUPRENORPHINE AND NALOXONE 8; 2 MG/1; MG/1
1 FILM, SOLUBLE BUCCAL; SUBLINGUAL DAILY
Qty: 3 FILM | Refills: 0 | Status: SHIPPED
Start: 2020-11-07 | End: 2020-11-09 | Stop reason: SDUPTHER

## 2020-11-07 RX ORDER — BUPRENORPHINE AND NALOXONE 8; 2 MG/1; MG/1
1 FILM, SOLUBLE BUCCAL; SUBLINGUAL DAILY
Qty: 3 FILM | Refills: 0 | Status: SHIPPED | OUTPATIENT
Start: 2020-11-07 | End: 2020-11-09 | Stop reason: SDUPTHER

## 2020-11-09 ENCOUNTER — OFFICE VISIT (OUTPATIENT)
Dept: INTERNAL MEDICINE CLINIC | Age: 30
End: 2020-11-09
Payer: COMMERCIAL

## 2020-11-09 VITALS
DIASTOLIC BLOOD PRESSURE: 72 MMHG | SYSTOLIC BLOOD PRESSURE: 118 MMHG | HEIGHT: 67 IN | WEIGHT: 122.2 LBS | HEART RATE: 70 BPM | TEMPERATURE: 97.3 F | BODY MASS INDEX: 19.18 KG/M2 | RESPIRATION RATE: 12 BRPM

## 2020-11-09 PROCEDURE — 99213 OFFICE O/P EST LOW 20 MIN: CPT | Performed by: INTERNAL MEDICINE

## 2020-11-09 PROCEDURE — G8420 CALC BMI NORM PARAMETERS: HCPCS | Performed by: INTERNAL MEDICINE

## 2020-11-09 PROCEDURE — 80305 DRUG TEST PRSMV DIR OPT OBS: CPT | Performed by: INTERNAL MEDICINE

## 2020-11-09 PROCEDURE — G8427 DOCREV CUR MEDS BY ELIG CLIN: HCPCS | Performed by: INTERNAL MEDICINE

## 2020-11-09 PROCEDURE — G8484 FLU IMMUNIZE NO ADMIN: HCPCS | Performed by: INTERNAL MEDICINE

## 2020-11-09 PROCEDURE — 4004F PT TOBACCO SCREEN RCVD TLK: CPT | Performed by: INTERNAL MEDICINE

## 2020-11-09 RX ORDER — BUPRENORPHINE AND NALOXONE 8; 2 MG/1; MG/1
1 FILM, SOLUBLE BUCCAL; SUBLINGUAL 2 TIMES DAILY
Qty: 14 FILM | Refills: 0 | Status: SHIPPED | OUTPATIENT
Start: 2020-11-09 | End: 2020-11-16 | Stop reason: SDUPTHER

## 2020-11-09 NOTE — PROGRESS NOTES
2020     Duarte Rowland (:  1990) is a 26 y.o. female, here for evaluation of the following medical concerns:      Chronic opiod  Use disorder / suboxone follow up       Erendira Henley  Is here two weeks post first sublocade shots, she does not like the shot. She got full dose of suboxone yesterday , as my partner dr. Balta Alan was on call. He called in 8 mg x 3 tabs, claims she was sweating yesterday, \" I just don't like the shot \" . Mild nausea  But no emesis, feels tired, felt weak. Claims she felt good for one week, then with problems. I called and spoke with her last week due to issues, she called in and spoke with Roberta Murcia. She lives alone, has a 10 yr old daughter. Father is a retired doc , now in a NH recuperating. She felt \" Isaac Lei almost like as If I getting off the percs \" . PATIENT does not  have cravings     Denies any recent  street drug intake. Reviewed patient drug screen      Patient completed her counselng as she has been in various programs over last several years, recently at [facility] START OF CARE 2020    OARRS report reviewed and d/w pt today , risk score of 610  Also had extensive last Urine tox screen from [facility] , +ve for amphetamines, uses aderral old supply on a prn basis     Review of Systems     Admits to mild nausea, and fatigue, no LOC . Denies any   CP or palpitations. COWS  7     Prior to Visit Medications    Medication Sig Taking? Authorizing Provider   buprenorphine-naloxone (SUBOXONE) 8-2 MG FILM SL film Place 1 Film under the tongue daily for 3 days. Yes Marylu Loza MD   buprenorphine-naloxone (SUBOXONE) 8-2 MG FILM SL film Place 1 Film under the tongue daily for 3 days. Yes Marylu Loza MD   buprenorphine-naloxone (SUBOXONE) 4-1 MG FILM SL film Place 1 Film under the tongue every morning for 2 days, THEN 1 Film every evening for 2 days.  Yes Savanna Matute MD   cloNIDine (CATAPRES) 0.1 MG tablet Take 1 tablet by mouth 2 times daily as needed for High Blood Pressure Yes Yared Han MD        Social History     Tobacco Use    Smoking status: Current Every Day Smoker     Packs/day: 0.25     Years: 1.00     Pack years: 0.25     Types: Cigarettes    Smokeless tobacco: Never Used   Substance Use Topics    Alcohol use: Not Currently        Vitals:    11/09/20 1604   BP: 118/72   Site: Left Upper Arm   Position: Sitting   Cuff Size: Large Adult   Pulse: 70   Resp: 12   Temp: 97.3 °F (36.3 °C)   TempSrc: Temporal   Weight: 122 lb 3.2 oz (55.4 kg)   Height: 5' 7\" (1.702 m)     Estimated body mass index is 19.14 kg/m² as calculated from the following:    Height as of this encounter: 5' 7\" (1.702 m). Weight as of this encounter: 122 lb 3.2 oz (55.4 kg). Physical Exam     Pupils normal size and reacting to light,  She is alert and oriented x 3, abd soft NT, BS, Lungs CTA, Heart s1 s2 reg HS,  Neck no JVD         ASSESSMENT/PLAN:     Diagnosis Orders   1. Severe opioid use disorder (HCC)  buprenorphine-naloxone (SUBOXONE) 8-2 MG FILM SL film    POCT Rapid Drug Screen   as she is not tolerating sublocade shots , got the first one two weeks ago, will switch her back to suboxone shots           GOAL:  To enhance patient recovery through the use of medication assisted treatment to improve overall quality of life. OBJECTIVE:  Patient will abstain from the use of mood altering substances 7 out of 7 days per week. INTERVENTION:  Patient was  transitioned to  Sublocade injections , as she has a 10 yr old daughter at home, and she lives far away from our office, and has been on suboxone for > one year and was accepted as a transfer from another program [facility]. Patient will continue current treatment regiment as outlined and treatment plan will be reviewed at each visit.   Overall transitioning to sublocade due to above conditions mentioned, will improve compliance and she is encouraged to continue an active and clean life style.    FIRST SHOT OF SUBLOCADE 300 mg given about 2 weeks ago, she prefers to go back to the strips , so   Back on 8 mg BID film # 14 , and no further sublocade  Shots. RTO in one week. An electronic signature was used to authenticate this note.     --Kiara Guerrero MD on 11/9/2020 at 4:08 PM

## 2020-11-16 ENCOUNTER — OFFICE VISIT (OUTPATIENT)
Dept: INTERNAL MEDICINE CLINIC | Age: 30
End: 2020-11-16
Payer: COMMERCIAL

## 2020-11-16 VITALS
BODY MASS INDEX: 19.15 KG/M2 | HEART RATE: 70 BPM | TEMPERATURE: 97.9 F | HEIGHT: 67 IN | DIASTOLIC BLOOD PRESSURE: 72 MMHG | SYSTOLIC BLOOD PRESSURE: 115 MMHG | RESPIRATION RATE: 12 BRPM | WEIGHT: 122 LBS

## 2020-11-16 PROCEDURE — 80305 DRUG TEST PRSMV DIR OPT OBS: CPT | Performed by: INTERNAL MEDICINE

## 2020-11-16 PROCEDURE — 99213 OFFICE O/P EST LOW 20 MIN: CPT | Performed by: INTERNAL MEDICINE

## 2020-11-16 PROCEDURE — G8427 DOCREV CUR MEDS BY ELIG CLIN: HCPCS | Performed by: INTERNAL MEDICINE

## 2020-11-16 PROCEDURE — G8484 FLU IMMUNIZE NO ADMIN: HCPCS | Performed by: INTERNAL MEDICINE

## 2020-11-16 PROCEDURE — 4004F PT TOBACCO SCREEN RCVD TLK: CPT | Performed by: INTERNAL MEDICINE

## 2020-11-16 PROCEDURE — G8420 CALC BMI NORM PARAMETERS: HCPCS | Performed by: INTERNAL MEDICINE

## 2020-11-16 RX ORDER — BUPRENORPHINE AND NALOXONE 8; 2 MG/1; MG/1
FILM, SOLUBLE BUCCAL; SUBLINGUAL
Qty: 10.5 FILM | Refills: 0 | Status: SHIPPED | OUTPATIENT
Start: 2020-11-16 | End: 2020-11-24 | Stop reason: SDUPTHER

## 2020-11-16 NOTE — PROGRESS NOTES
2020     Caroline Wu (:  1990) is a 26 y.o. female, here for evaluation of the following medical concerns:      Chronic opiod  Use disorder / suboxone follow up       Yadira Marshall  Is here three  weeks post first sublocade shots, she does not like the shot. She got full dose of suboxone as my partner dr. Itz Trna was on call. He called in 8 mg x 3 tabs, claims she was sweating yesterday, \" I just don't like the shot \" . She was started back on suboxone 8mg am   And 1/2 tab PM. She is doing better now on the above regimen. Jung Irene PATIENT does not  have cravings     Denies any recent  street drug intake. Here with her daughter today. Reviewed patient drug screen      Patient completed her counselng as she has been in various programs over last several years, recently at [facility] [place] [facility] START OF CARE 2020    OARRS report reviewed and d/w pt today , risk score of 610  Also had extensive last Urine tox screen from [facility] , +ve for amphetamines, uses aderral old supply on a prn basis     Review of Systems     Admits to mild nausea, and fatigue, no LOC . Denies any   CP or palpitations. COWS  3    Prior to Visit Medications    Medication Sig Taking? Authorizing Provider   buprenorphine-naloxone (SUBOXONE) 8-2 MG FILM SL film Place 1 Film under the tongue every morning AND 0.5 Film every evening. Do all this for 7 days.  Yes Rola Ashton MD   cloNIDine (CATAPRES) 0.1 MG tablet Take 1 tablet by mouth 2 times daily as needed for High Blood Pressure Yes Rola Ashton MD        Social History     Tobacco Use    Smoking status: Current Every Day Smoker     Packs/day: 0.25     Years: 1.00     Pack years: 0.25     Types: Cigarettes    Smokeless tobacco: Never Used   Substance Use Topics    Alcohol use: Not Currently        Vitals:    20 1553   BP: 115/72   Site: Right Upper Arm   Position: Sitting   Cuff Size: Large Adult   Pulse: 70   Resp: 12   Temp: 97.9 °F (36.6 °C)   TempSrc: Temporal   Weight: 122 lb (55.3 kg)   Height: 5' 7\" (1.702 m)     Estimated body mass index is 19.11 kg/m² as calculated from the following:    Height as of this encounter: 5' 7\" (1.702 m). Weight as of this encounter: 122 lb (55.3 kg). Physical Exam     Pupils normal size and reacting to light,  She is alert and oriented x 3, abd soft NT, BS, Lungs CTA,   Reg HS          ASSESSMENT/PLAN:     Diagnosis Orders   1. Severe opioid use disorder (HCC)  POCT Rapid Drug Screen    buprenorphine-naloxone (SUBOXONE) 8-2 MG FILM SL film     Pt could not tolerate the sublocade shot, given 3 weeks ago. So she is back on 8 mg suboxone am, and 1/2 film PM.           GOAL:  To enhance patient recovery through the use of medication assisted treatment to improve overall quality of life. OBJECTIVE:  Patient will abstain from the use of mood altering substances 7 out of 7 days per week. INTERVENTION:  Patient was  transitioned to  Sublocade injections , as she has a 10 yr old daughter at home, and she lives far away from our office, and has been on suboxone for > one year and was accepted as a transfer from another program OOT. Patient will continue current treatment regiment as outlined and treatment plan will be reviewed at each visit. Overall transitioning to sublocade due to above conditions mentioned, will improve compliance and she is encouraged to continue an active and clean life style. RTO in one week. An electronic signature was used to authenticate this note.     --Delano Torres MD on 11/16/2020 at 4:07 PM

## 2020-11-24 ENCOUNTER — OFFICE VISIT (OUTPATIENT)
Dept: INTERNAL MEDICINE CLINIC | Age: 30
End: 2020-11-24
Payer: COMMERCIAL

## 2020-11-24 VITALS
HEART RATE: 103 BPM | BODY MASS INDEX: 19.21 KG/M2 | TEMPERATURE: 98.1 F | DIASTOLIC BLOOD PRESSURE: 79 MMHG | RESPIRATION RATE: 12 BRPM | HEIGHT: 67 IN | SYSTOLIC BLOOD PRESSURE: 133 MMHG | WEIGHT: 122.4 LBS

## 2020-11-24 PROCEDURE — 4004F PT TOBACCO SCREEN RCVD TLK: CPT | Performed by: NURSE PRACTITIONER

## 2020-11-24 PROCEDURE — 80305 DRUG TEST PRSMV DIR OPT OBS: CPT | Performed by: NURSE PRACTITIONER

## 2020-11-24 PROCEDURE — G8427 DOCREV CUR MEDS BY ELIG CLIN: HCPCS | Performed by: NURSE PRACTITIONER

## 2020-11-24 PROCEDURE — 99213 OFFICE O/P EST LOW 20 MIN: CPT | Performed by: NURSE PRACTITIONER

## 2020-11-24 PROCEDURE — G8420 CALC BMI NORM PARAMETERS: HCPCS | Performed by: NURSE PRACTITIONER

## 2020-11-24 PROCEDURE — G8484 FLU IMMUNIZE NO ADMIN: HCPCS | Performed by: NURSE PRACTITIONER

## 2020-11-24 RX ORDER — BUPRENORPHINE AND NALOXONE 8; 2 MG/1; MG/1
1 FILM, SOLUBLE BUCCAL; SUBLINGUAL 2 TIMES DAILY
Qty: 14 FILM | Refills: 0 | Status: SHIPPED | OUTPATIENT
Start: 2020-11-24 | End: 2020-12-03 | Stop reason: SDUPTHER

## 2020-11-24 NOTE — PROGRESS NOTES
Per Verbal order of CNP Elease Ligas  for urine drug screen. Patient is positive for  BUP,AMP  . Verified with results with Tonny Seymour LPN.

## 2020-11-24 NOTE — PROGRESS NOTES
history. She has never used smokeless tobacco. She reports previous alcohol use. She reports previous drug use. Health Maintenance:    Health Maintenance   Topic Date Due    Varicella vaccine (1 of 2 - 2-dose childhood series) 05/15/1989    Pneumococcal 0-64 years Vaccine (1 of 1 - PPSV23) 05/15/1994    HIV screen  05/15/2003    DTaP/Tdap/Td vaccine (1 - Tdap) 05/15/2007    Cervical cancer screen  05/15/2009    Flu vaccine (1) 09/01/2020    Hepatitis A vaccine  Aged Out    Hepatitis B vaccine  Aged Out    Hib vaccine  Aged Out    Meningococcal (ACWY) vaccine  Aged Out       Subjective:      Review of Systems   Constitutional: Negative for chills, fatigue and fever. Respiratory: Negative for cough and shortness of breath. Cardiovascular: Negative for chest pain and leg swelling. Gastrointestinal: Negative for abdominal pain, diarrhea, nausea and vomiting. Genitourinary: Negative for difficulty urinating and dysuria. Musculoskeletal: Negative for arthralgias and myalgias. Skin: Negative for rash and wound. Neurological: Negative for dizziness, weakness, light-headedness and headaches. Psychiatric/Behavioral: Negative for agitation, behavioral problems and dysphoric mood. Objective:     /79 (Site: Left Upper Arm, Position: Sitting, Cuff Size: Large Adult)   Pulse 103   Temp 98.1 °F (36.7 °C) (Temporal)   Resp 12   Ht 5' 7\" (1.702 m)   Wt 122 lb 6.4 oz (55.5 kg)   BMI 19.17 kg/m²     Physical Exam  Vitals signs reviewed. Constitutional:       Appearance: She is well-developed. HENT:      Head: Normocephalic and atraumatic. Eyes:      General: No scleral icterus. Right eye: No discharge. Left eye: No discharge. Pupils: Pupils are equal, round, and reactive to light. Cardiovascular:      Rate and Rhythm: Normal rate and regular rhythm. Heart sounds: Normal heart sounds. No murmur. No friction rub. No gallop.     Pulmonary:      Effort: Pulmonary effort is normal. No respiratory distress. Breath sounds: Normal breath sounds. No wheezing or rales. Abdominal:      General: There is no distension. Palpations: Abdomen is soft. Tenderness: There is no abdominal tenderness. Skin:     General: Skin is warm and dry. Coloration: Skin is not pale. Findings: No erythema. Neurological:      Mental Status: She is alert and oriented to person, place, and time. Labs Reviewed 11/24/2020:    No results found for: WBC, HGB, HCT, PLT, CHOL, TRIG, HDL, LDLDIRECT, ALT, AST, NA, K, CL, CREATININE, BUN, CO2, TSH, PSA, INR, GLUF, LABA1C, LABMICR    Assessment/Plan      1. Severe opioid use disorder (HCC)  UDS amp, buprenorphine. OARRS appropriate to treatment, no discrepancy identified  Cravings/urges controlled, no withdrawal symptoms. On Suboxone 8 mg a.m., 4 mg p.m. Feels that she would do better back on 8 mg twice daily. She has had some cravings urges times she is to let her to use amphetamine out an old prescription that she had. Encouraged to get rid of these and not to use any more. Will increase back to 8 mg twice daily. Continue counseling as advised at this time. Follow up with Dr. Sindhu Waldrop in 7 days.   - POCT Rapid Drug Screen  - buprenorphine-naloxone (SUBOXONE) 8-2 MG FILM SL film; Place 1 Film under the tongue 2 times daily for 7 days. Dispense: 14 Film; Refill: 0    2. Encounter for monitoring Suboxone maintenance therapy      Return in about 1 week (around 12/1/2020) for OBOT PB pt . Patient given educational materials - see patient instructions. Discussed use, benefit, and side effects of prescribed medications. All patient questions answered. Pt voiced understanding.       Electronically signed JAQUELINE Mayer CNP on 11/24/20 at 3:42 PM EST

## 2020-12-03 ENCOUNTER — OFFICE VISIT (OUTPATIENT)
Dept: INTERNAL MEDICINE CLINIC | Age: 30
End: 2020-12-03
Payer: COMMERCIAL

## 2020-12-03 VITALS
WEIGHT: 125.4 LBS | BODY MASS INDEX: 19.68 KG/M2 | SYSTOLIC BLOOD PRESSURE: 120 MMHG | HEART RATE: 101 BPM | DIASTOLIC BLOOD PRESSURE: 67 MMHG | HEIGHT: 67 IN | TEMPERATURE: 98.4 F

## 2020-12-03 PROCEDURE — 4004F PT TOBACCO SCREEN RCVD TLK: CPT | Performed by: INTERNAL MEDICINE

## 2020-12-03 PROCEDURE — 99214 OFFICE O/P EST MOD 30 MIN: CPT | Performed by: INTERNAL MEDICINE

## 2020-12-03 PROCEDURE — 80305 DRUG TEST PRSMV DIR OPT OBS: CPT | Performed by: INTERNAL MEDICINE

## 2020-12-03 PROCEDURE — G8484 FLU IMMUNIZE NO ADMIN: HCPCS | Performed by: INTERNAL MEDICINE

## 2020-12-03 PROCEDURE — G8420 CALC BMI NORM PARAMETERS: HCPCS | Performed by: INTERNAL MEDICINE

## 2020-12-03 PROCEDURE — G8427 DOCREV CUR MEDS BY ELIG CLIN: HCPCS | Performed by: INTERNAL MEDICINE

## 2020-12-03 RX ORDER — BUPRENORPHINE AND NALOXONE 8; 2 MG/1; MG/1
FILM, SOLUBLE BUCCAL; SUBLINGUAL
Qty: 11 FILM | Refills: 0 | Status: SHIPPED | OUTPATIENT
Start: 2020-12-03 | End: 2020-12-10

## 2020-12-03 NOTE — PROGRESS NOTES
Per Verbal order of  for urine drug screen. Patient is positive for  BUP,AMP  . Verified with results with Antonieta Durand LPN.

## 2020-12-03 NOTE — PROGRESS NOTES
12/3/2020     Vamsi Truong (:  1990) is a 26 y.o. female, here for evaluation of the following medical concerns:      Chronic opiod  Use disorder / suboxone follow up       Vega Golden  Is here for f/u, MAT  post first sublocade shots, she does not like the shot so we switched her back to suboxone   Films. Last seen on  by Tasha Callejas in our office. Last two visits, she could not urinate so was not evaluated  And she was advised to drink plenty of fluids pre each visit  On multiple occasions. Pt claims she was under a lot of  Stress. Last intake of suboxone yesterday am. Over the last   Month she has been having left sided CP for 5-10 mins,   No diaphoresis, she is a smoker, no wheezing. Her father   Is being discharged from [facility] tomorrow. She denies any cough, fever or chills. She got 14 day supply on the last office visit . Marlen Rangel PATIENT does not  have cravings     Denies any recent  street drug intake. Here with her daughter today. Reviewed patient drug screen with the pt today       Patient completed her counselng as she has been in various programs over last several years, recently at [facility] START OF CARE 2020    OARRS report reviewed and d/w pt today , risk score of 610  Also had extensive last Urine tox screen from [facility] , +ve for amphetamines, uses aderral old supply on a prn basis     Review of Systems     Admits to mild nausea, and fatigue, no LOC . Denies any   CP or palpitations. COWS  3    Prior to Visit Medications    Medication Sig Taking? Authorizing Provider   buprenorphine-naloxone (SUBOXONE) 8-2 MG FILM SL film Place 1 Film under the tongue daily AND 0.5 Film every evening. Do all this for 7 days.  Yes Yared Han MD   cloNIDine (CATAPRES) 0.1 MG tablet Take 1 tablet by mouth 2 times daily as needed for High Blood Pressure Yes Luma Hare MD        Social History     Tobacco Use    Smoking status: Current Every Day Smoker     Packs/day: 0.25     Years: 1.00     Pack years: 0.25     Types: Cigarettes    Smokeless tobacco: Never Used   Substance Use Topics    Alcohol use: Not Currently        Vitals:    20 1548   BP: 120/67   Site: Right Upper Arm   Position: Sitting   Cuff Size: Medium Adult   Pulse: 101   Temp: 98.4 °F (36.9 °C)   TempSrc: Temporal   Weight: 125 lb 6.4 oz (56.9 kg)   Height: 5' 7\" (1.702 m)     Estimated body mass index is 19.64 kg/m² as calculated from the following:    Height as of this encounter: 5' 7\" (1.702 m). Weight as of this encounter: 125 lb 6.4 oz (56.9 kg). Physical Exam     Pupils normal size and reacting to light,  She is alert and oriented x 3, abd soft NT, BS, Lungs CTA,   Reg HS          ASSESSMENT/PLAN:     Diagnosis Orders   1. Severe opioid use disorder (HCC)  POCT Rapid Drug Screen    buprenorphine-naloxone (SUBOXONE) 8-2 MG FILM SL film   2. Attention deficit hyperactivity disorder (ADHD), combined type  Paola Sy MD, Psychiatry, [facility].[place]     Pt could not tolerate the sublocade shot, given 3 weeks ago. So she is back on 8 mg suboxone am, and 1/2 film PM for the next 7 days, # 11 films given    2. Hx of CP - most likely due to stress, she was also encouraged to quit smoking. Will obtain a CXR. If CP  Persists will work up for other causes. No reproducible   CP . 3. Hx of ? ADD - will refer to psychiatry here in town. Was seen by a pscyhiatrist in [facility]  after her  . She uses On a prn basis, before being a percocet. GOAL:  To enhance patient recovery through the use of medication assisted treatment to improve overall quality of life. RTO in one week, and again encouraged to drink plenty of fluids mainly water before each office visit as she needs to give a urine sample      An electronic signature was used to authenticate this note.     --Lizeth Kay MD on 12/3/2020 at 4:28 PM

## 2020-12-08 ASSESSMENT — ENCOUNTER SYMPTOMS
DIARRHEA: 0
SHORTNESS OF BREATH: 0
COUGH: 0
VOMITING: 0
ABDOMINAL PAIN: 0
NAUSEA: 0

## 2020-12-14 ENCOUNTER — NURSE ONLY (OUTPATIENT)
Dept: INTERNAL MEDICINE CLINIC | Age: 30
End: 2020-12-14
Payer: COMMERCIAL

## 2020-12-14 PROCEDURE — 80305 DRUG TEST PRSMV DIR OPT OBS: CPT | Performed by: NURSE PRACTITIONER

## 2020-12-14 RX ORDER — BUPRENORPHINE AND NALOXONE 8; 2 MG/1; MG/1
1.5 FILM, SOLUBLE BUCCAL; SUBLINGUAL DAILY
Qty: 5 FILM | Refills: 0 | Status: SHIPPED | OUTPATIENT
Start: 2020-12-14 | End: 2020-12-17 | Stop reason: SDUPTHER

## 2020-12-14 NOTE — PROGRESS NOTES
Verbal order per Afua Mcconnell CNP for urine drug screen. Positive for BUP. Verified results with Carrie De La Rosa CNP. Afua Mcconnell CNP ordered patient Buprenorphine Naloxone 8-2mg film 1 film in the am and 1/2 film in the pm for 3 days. Verified with patient correct dose and pharmacy [facility].

## 2020-12-15 RX ORDER — CLONIDINE HYDROCHLORIDE 0.1 MG/1
0.1 TABLET ORAL 2 TIMES DAILY PRN
Qty: 60 TABLET | Refills: 3 | Status: SHIPPED | OUTPATIENT
Start: 2020-12-15 | End: 2022-03-10

## 2020-12-17 ENCOUNTER — OFFICE VISIT (OUTPATIENT)
Dept: INTERNAL MEDICINE CLINIC | Age: 30
End: 2020-12-17
Payer: COMMERCIAL

## 2020-12-17 VITALS
SYSTOLIC BLOOD PRESSURE: 130 MMHG | HEART RATE: 110 BPM | BODY MASS INDEX: 19.81 KG/M2 | HEIGHT: 67 IN | WEIGHT: 126.2 LBS | TEMPERATURE: 98.2 F | DIASTOLIC BLOOD PRESSURE: 56 MMHG

## 2020-12-17 PROCEDURE — 4004F PT TOBACCO SCREEN RCVD TLK: CPT | Performed by: INTERNAL MEDICINE

## 2020-12-17 PROCEDURE — 80305 DRUG TEST PRSMV DIR OPT OBS: CPT | Performed by: INTERNAL MEDICINE

## 2020-12-17 PROCEDURE — 99213 OFFICE O/P EST LOW 20 MIN: CPT | Performed by: INTERNAL MEDICINE

## 2020-12-17 PROCEDURE — G8484 FLU IMMUNIZE NO ADMIN: HCPCS | Performed by: INTERNAL MEDICINE

## 2020-12-17 PROCEDURE — G8427 DOCREV CUR MEDS BY ELIG CLIN: HCPCS | Performed by: INTERNAL MEDICINE

## 2020-12-17 PROCEDURE — G8420 CALC BMI NORM PARAMETERS: HCPCS | Performed by: INTERNAL MEDICINE

## 2020-12-17 RX ORDER — BUPRENORPHINE AND NALOXONE 8; 2 MG/1; MG/1
1.5 FILM, SOLUBLE BUCCAL; SUBLINGUAL DAILY
Qty: 11 FILM | Refills: 0 | Status: SHIPPED | OUTPATIENT
Start: 2020-12-17 | End: 2020-12-23 | Stop reason: SDUPTHER

## 2020-12-17 NOTE — PROGRESS NOTES
2020     Clay Alonso (:  1990) is a 26 y.o. female, here for evaluation of the following medical concerns:      Chronic opiod  Use disorder / suboxone follow up       Mary Zendejas  Is here for f/u, MAT  post first sublocade shots, she does not like the shot so we switched her back to suboxone   Films. Last seen on  by Jeff Jacques in our office. Last two visits, she could not urinate so was not evaluated  And she was advised to drink plenty of fluids pre each visit  On multiple occasions. She does have sweats at night, no   Urinary symptoms, no cough. She is smoker, trying to quit  1 PPD Q 3 days. Here with her daughter, who is in [address]. Tam Briggs PATIENT does not  have cravings     Denies any recent  street drug intake. Here with her daughter today. Reviewed patient drug screen with the pt today ,    Will send it out for further studies. Patient completed her counselng as she has been in various programs over last several years, recently at [facility] START OF CARE 2020    OARRS report reviewed and d/w pt today , risk score of 620  Also had extensive last Urine tox screen from [facility] , +ve for amphetamines, uses aderral old supply on a prn basis     Review of Systems     Admits to mild nausea, and fatigue, no LOC . Denies any   CP or palpitations. COWS  3    Prior to Visit Medications    Medication Sig Taking? Authorizing Provider   cloNIDine (CATAPRES) 0.1 MG tablet Take 1 tablet by mouth 2 times daily as needed for High Blood Pressure Yes Yared Han MD   buprenorphine-naloxone (SUBOXONE) 8-2 MG FILM SL film Place 1.5 Film under the tongue daily for 3 days.  1 film in the am and 0.5 film in the pm Yes Excell Breed, APRN - CNP        Social History     Tobacco Use    Smoking status: Current Every Day Smoker     Packs/day: 0.25     Years: 1.00     Pack years: 0.25     Types: Cigarettes    Smokeless tobacco: Never Used Substance Use Topics    Alcohol use: Not Currently        Vitals:    20 1612   BP: (!) 130/56   Site: Right Upper Arm   Pulse: 110   Temp: 98.2 °F (36.8 °C)   Weight: 126 lb 3.2 oz (57.2 kg)   Height: 5' 7.01\" (1.702 m)     Estimated body mass index is 19.76 kg/m² as calculated from the following:    Height as of this encounter: 5' 7.01\" (1.702 m). Weight as of this encounter: 126 lb 3.2 oz (57.2 kg). Physical Exam     Pupils normal size and reacting to light,  She is alert and oriented x 3, abd soft NT, BS, Lungs CTA,   Reg HS          ASSESSMENT/PLAN:     Diagnosis Orders   1. Severe opioid use disorder (HCC)  POCT Rapid Drug Screen     Pt could not tolerate the sublocade shot, given 3 weeks ago. So she is back on 8 mg suboxone am, and 1/2 film PM for the next 7 days, # 11 films given, overall better with PO regimen. 2. Hx of ? ADD - will refer to psychiatry here in town. Was seen by a pscyhiatrist in [facility]  after her  . She uses On a prn basis, before being a percocet. She used old supply of   Tramadol, one tab due to back pain. GOAL:  To enhance patient recovery through the use of medication assisted treatment to improve overall quality of life. RTO in 6 days  and again encouraged to drink plenty of fluids mainly water before each office visit as she needs to give a urine sample      An electronic signature was used to authenticate this note.     --Luis Turcios MD on 2020 at 4:49 PM

## 2020-12-17 NOTE — PROGRESS NOTES
Per vo from Dr. John Gallagher drug screen was ordered and resulted. It is positive for BUP and TRA. This was confirmed with Giovana Barr RN. Dr. Sandi Fontenot was notified.

## 2020-12-23 ENCOUNTER — OFFICE VISIT (OUTPATIENT)
Dept: INTERNAL MEDICINE CLINIC | Age: 30
End: 2020-12-23
Payer: COMMERCIAL

## 2020-12-23 VITALS
BODY MASS INDEX: 19.43 KG/M2 | WEIGHT: 123.8 LBS | HEART RATE: 107 BPM | SYSTOLIC BLOOD PRESSURE: 138 MMHG | DIASTOLIC BLOOD PRESSURE: 83 MMHG | HEIGHT: 67 IN | TEMPERATURE: 98.3 F

## 2020-12-23 PROCEDURE — G8427 DOCREV CUR MEDS BY ELIG CLIN: HCPCS | Performed by: INTERNAL MEDICINE

## 2020-12-23 PROCEDURE — 4004F PT TOBACCO SCREEN RCVD TLK: CPT | Performed by: INTERNAL MEDICINE

## 2020-12-23 PROCEDURE — 80305 DRUG TEST PRSMV DIR OPT OBS: CPT | Performed by: INTERNAL MEDICINE

## 2020-12-23 PROCEDURE — 99213 OFFICE O/P EST LOW 20 MIN: CPT | Performed by: INTERNAL MEDICINE

## 2020-12-23 PROCEDURE — G8420 CALC BMI NORM PARAMETERS: HCPCS | Performed by: INTERNAL MEDICINE

## 2020-12-23 PROCEDURE — G8484 FLU IMMUNIZE NO ADMIN: HCPCS | Performed by: INTERNAL MEDICINE

## 2020-12-23 RX ORDER — BUPRENORPHINE AND NALOXONE 8; 2 MG/1; MG/1
1.5 FILM, SOLUBLE BUCCAL; SUBLINGUAL DAILY
Qty: 11 FILM | Refills: 0 | Status: SHIPPED | OUTPATIENT
Start: 2020-12-23 | End: 2021-01-04 | Stop reason: SDUPTHER

## 2020-12-23 NOTE — PROGRESS NOTES
2020     Tran Black (:  1990) is a 26 y.o. female, here for evaluation of the following medical concerns:      Chronic opiod  Use disorder / suboxone follow up       Christopher Blanchard  Is here for f/u, MAT  post first sublocade shots, she does not like the shot so we switched her back to suboxone   Films. Last two visits, she could not urinate so was not evaluated  And she was advised to drink plenty of fluids pre each visit  On multiple occasions. She does have sweats at night, no   Urinary symptoms, no cough. She is smoker, trying to quit  1 PPD Q 3 days. Her recent extensive Urine studies, from   [facility] were reviewed with her, with + ve amph, tramadol. .   With any chest pain, shortness of breath and no palpitations  Denies abdominal pain or nausea vomiting         PATIENT does not  have cravings     Denies any recent  street drug intake, 2019    Reviewed patient drug screen with the pt today ,    Will send it out for further studies. Patient completed her counselng as she has been in various programs over last several years, recently at [facility] START OF CARE 2020    OARRS report reviewed and d/w pt today , risk score of 620  Also had extensive last Urine tox screen from [facility] , +ve for amphetamines, uses aderral old supply on a prn basis     Review of Systems     Refer to HPI    COWS  3    Prior to Visit Medications    Medication Sig Taking? Authorizing Provider   buprenorphine-naloxone (SUBOXONE) 8-2 MG FILM SL film Place 1.5 Film under the tongue daily for 7 days.  1 film in the am and 0.5 film in the pm Yes Yared Han MD   cloNIDine (CATAPRES) 0.1 MG tablet Take 1 tablet by mouth 2 times daily as needed for High Blood Pressure Yes Krystal Ortega MD        Social History     Tobacco Use    Smoking status: Current Every Day Smoker     Packs/day: 0.25     Years: 1.00     Pack years: 0.25     Types: Cigarettes    Smokeless tobacco: Never Used Substance Use Topics    Alcohol use: Not Currently        Vitals:    20 1125   BP: 138/83   Site: Right Upper Arm   Position: Sitting   Cuff Size: Large Adult   Pulse: 107   Temp: 98.3 °F (36.8 °C)   TempSrc: Temporal   Weight: 123 lb 12.8 oz (56.2 kg)   Height: 5' 7\" (1.702 m)     Estimated body mass index is 19.39 kg/m² as calculated from the following:    Height as of this encounter: 5' 7\" (1.702 m). Weight as of this encounter: 123 lb 12.8 oz (56.2 kg). Physical Exam     Pupils normal size and reacting to light,  She is alert and oriented x 3, abd soft NT, BS, Lungs CTA,   Reg HS          ASSESSMENT/PLAN:     Diagnosis Orders   1. Severe opioid use disorder (HCC)  POCT Rapid Drug Screen    buprenorphine-naloxone (SUBOXONE) 8-2 MG FILM SL film     Pt could not tolerate the sublocade shot, given 3 weeks ago. So she is back on 8 mg suboxone am, and 1/2 film PM for the next 7 days, # 11 films given, overall better with PO regimen. 2. Hx of ? ADD - will refer to psychiatry here in town. Was seen by a pscyhiatrist in [facility]  after her  . She uses adderall on  a prn basis, before being a percocet. She used old supply of tramadol from her mother recommend she does not  Use them, and denies any recent amphetamines. GOAL:  To enhance patient recovery through the use of medication assisted treatment to improve overall quality of life. RTO in 7 days  and again encouraged to drink plenty of fluids mainly water before each office visit as she needs to give a urine sample. Will be seeing her Q weekly till urine studies   Are normal x 2      An electronic signature was used to authenticate this note.     --Vincent Hall MD on 2020 at 12:13 PM

## 2021-01-04 ENCOUNTER — OFFICE VISIT (OUTPATIENT)
Dept: INTERNAL MEDICINE CLINIC | Age: 31
End: 2021-01-04
Payer: COMMERCIAL

## 2021-01-04 VITALS
RESPIRATION RATE: 12 BRPM | WEIGHT: 123.4 LBS | DIASTOLIC BLOOD PRESSURE: 70 MMHG | BODY MASS INDEX: 19.37 KG/M2 | SYSTOLIC BLOOD PRESSURE: 122 MMHG | TEMPERATURE: 98.9 F | HEART RATE: 114 BPM | HEIGHT: 67 IN

## 2021-01-04 DIAGNOSIS — F11.20 SEVERE OPIOID USE DISORDER (HCC): Primary | ICD-10-CM

## 2021-01-04 PROCEDURE — 4004F PT TOBACCO SCREEN RCVD TLK: CPT | Performed by: INTERNAL MEDICINE

## 2021-01-04 PROCEDURE — 99213 OFFICE O/P EST LOW 20 MIN: CPT | Performed by: INTERNAL MEDICINE

## 2021-01-04 PROCEDURE — G8427 DOCREV CUR MEDS BY ELIG CLIN: HCPCS | Performed by: INTERNAL MEDICINE

## 2021-01-04 PROCEDURE — G8420 CALC BMI NORM PARAMETERS: HCPCS | Performed by: INTERNAL MEDICINE

## 2021-01-04 PROCEDURE — G8484 FLU IMMUNIZE NO ADMIN: HCPCS | Performed by: INTERNAL MEDICINE

## 2021-01-04 PROCEDURE — 80305 DRUG TEST PRSMV DIR OPT OBS: CPT | Performed by: INTERNAL MEDICINE

## 2021-01-04 RX ORDER — BUPRENORPHINE AND NALOXONE 8; 2 MG/1; MG/1
1 FILM, SOLUBLE BUCCAL; SUBLINGUAL 2 TIMES DAILY
Qty: 14 FILM | Refills: 0 | Status: SHIPPED | OUTPATIENT
Start: 2021-01-04 | End: 2021-01-11

## 2021-01-04 ASSESSMENT — PATIENT HEALTH QUESTIONNAIRE - PHQ9
SUM OF ALL RESPONSES TO PHQ QUESTIONS 1-9: 0
SUM OF ALL RESPONSES TO PHQ9 QUESTIONS 1 & 2: 0

## 2021-01-04 NOTE — PROGRESS NOTES
Per Verbal order of  for urine drug screen. Patient is positive for  BUP,TRA  . Verified with results with Mauri Toussaint LPN.

## 2021-01-04 NOTE — PROGRESS NOTES
2021     Carlo Montes (:  1990) is a 26 y.o. female, here for evaluation of the following medical concerns: MAT      Chronic opiod  Use disorder / suboxone follow up          She was in [place] for the holiday weekend, and last dose  Of suboxone was two days ago, due to back pain she took  An old prescription of ultram.   Last two visits, she could not urinate so was not evaluated  And she was advised to drink plenty of fluids pre each visit  On multiple occasions. She does have sweats at night, no   Urinary symptoms, no cough. She is smoker, trying to quit  1 PPD Q 3 days. Her recent extensive Urine studies, from   [facility] were reviewed with her, with + ve , tramadol. .   With any chest pain, shortness of breath and no palpitations  Denies abdominal pain or nausea vomiting. She feels   At home she has sweats, and prefers to go up to suboxone  From 1/2 to full film. PATIENT does not  have cravings     Denies any recent  street drug intake, 2019    Reviewed patient drug screen with the pt today ,    Will send it out for further studies. Patient completed her counselng as she has been in various programs over last several years, recently at [facility] START OF CARE 2020    OARRS report reviewed and d/w pt today , risk score of 610  Also had extensive last Urine tox screen from [facility] , +ve for amphetamines, uses aderral old supply on a prn basis     Review of Systems     Refer to HPI    COWS  3    Prior to Visit Medications    Medication Sig Taking? Authorizing Provider   buprenorphine-naloxone (SUBOXONE) 8-2 MG FILM SL film Place 1 Film under the tongue 2 times daily for 7 days.  New dose Yes Yared Han MD   cloNIDine (CATAPRES) 0.1 MG tablet Take 1 tablet by mouth 2 times daily as needed for High Blood Pressure Yes Danny Henry MD        Social History     Tobacco Use    Smoking status: Current Every Day Smoker     Packs/day: 0.25 Years: 1.00     Pack years: 0.25     Types: Cigarettes    Smokeless tobacco: Never Used   Substance Use Topics    Alcohol use: Not Currently        Vitals:    21 1350   BP: 122/70   Site: Left Upper Arm   Position: Sitting   Cuff Size: Large Adult   Pulse: 114   Resp: 12   Temp: 98.9 °F (37.2 °C)   TempSrc: Temporal   Weight: 123 lb 6.4 oz (56 kg)   Height: 5' 7\" (1.702 m)     Estimated body mass index is 19.33 kg/m² as calculated from the following:    Height as of this encounter: 5' 7\" (1.702 m). Weight as of this encounter: 123 lb 6.4 oz (56 kg). Physical Exam     Pupils normal size and reacting to light,  She is alert and oriented x 3, abd soft NT, BS, Lungs CTA,   Reg HS          ASSESSMENT/PLAN:     Diagnosis Orders   1. Severe opioid use disorder (HCC)  POCT Rapid Drug Screen    buprenorphine-naloxone (SUBOXONE) 8-2 MG FILM SL film     Pt could not tolerate the sublocade shot, given 3 weeks ago. So she is back on 8 mg suboxone increase to BID, and follow up  In 7 days      2. Hx of ? ADD - will refer to psychiatry here in town. Was seen by a pscyhiatrist in [facility]  after her  . She uses adderall on  a prn basis, before being a percocet. She used old supply of tramadol from her mother recommend she does not  Use them, and denies any recent amphetamines. GOAL:  To enhance patient recovery through the use of medication assisted treatment to improve overall quality of life. RTO in 7 days  and again encouraged to drink plenty of fluids mainly water before each office visit as she needs to give a urine sample. suboxone 8 mg BID  # 14 , for 7 days. An electronic signature was used to authenticate this note.     --Julianne Soliman MD on 2021 at 2:32 PM

## 2021-01-11 ENCOUNTER — TELEPHONE (OUTPATIENT)
Dept: INTERNAL MEDICINE CLINIC | Age: 31
End: 2021-01-11

## 2021-01-11 NOTE — TELEPHONE ENCOUNTER
Per vo from Dr. Laird Music called pt to put a bug in her ear to drink extra fluids in anticipation to her appt this afternoon. I left a message on pts voicemail.

## 2021-01-14 ENCOUNTER — OFFICE VISIT (OUTPATIENT)
Dept: INTERNAL MEDICINE CLINIC | Age: 31
End: 2021-01-14
Payer: COMMERCIAL

## 2021-01-14 VITALS
HEART RATE: 86 BPM | TEMPERATURE: 97.7 F | BODY MASS INDEX: 19.68 KG/M2 | SYSTOLIC BLOOD PRESSURE: 104 MMHG | HEIGHT: 67 IN | WEIGHT: 125.4 LBS | DIASTOLIC BLOOD PRESSURE: 70 MMHG

## 2021-01-14 DIAGNOSIS — F11.20 SEVERE OPIOID USE DISORDER (HCC): Primary | ICD-10-CM

## 2021-01-14 PROCEDURE — G8427 DOCREV CUR MEDS BY ELIG CLIN: HCPCS | Performed by: INTERNAL MEDICINE

## 2021-01-14 PROCEDURE — G8420 CALC BMI NORM PARAMETERS: HCPCS | Performed by: INTERNAL MEDICINE

## 2021-01-14 PROCEDURE — G8484 FLU IMMUNIZE NO ADMIN: HCPCS | Performed by: INTERNAL MEDICINE

## 2021-01-14 PROCEDURE — 99213 OFFICE O/P EST LOW 20 MIN: CPT | Performed by: INTERNAL MEDICINE

## 2021-01-14 PROCEDURE — 4004F PT TOBACCO SCREEN RCVD TLK: CPT | Performed by: INTERNAL MEDICINE

## 2021-01-14 PROCEDURE — 80305 DRUG TEST PRSMV DIR OPT OBS: CPT | Performed by: INTERNAL MEDICINE

## 2021-01-14 RX ORDER — BUPRENORPHINE AND NALOXONE 8; 2 MG/1; MG/1
1 FILM, SOLUBLE BUCCAL; SUBLINGUAL 2 TIMES DAILY
Qty: 14 FILM | Refills: 0 | Status: SHIPPED | OUTPATIENT
Start: 2021-01-14 | End: 2021-01-21 | Stop reason: SDUPTHER

## 2021-01-14 NOTE — PROGRESS NOTES
2021     Caitlyn Yanez (:  1990) is a 26 y.o. female, here for evaluation of the following medical concerns: MAT      Chronic opiod  Use disorder / suboxone follow up        Here for weekly follow up as she Is on suboxone, missed  The recent appointment 3 days ago, claims could not get   Out of work. \" takes care of my dad \". Last few visits, she could not urinate so was not evaluated  And she was advised to drink plenty of fluids pre each visit  On multiple occasions. She does have sweats at night, no   Urinary symptoms, no cough. She is smoker, trying to quit  1 PPD Q 3 days. Her last extensive Urine studies, from   [facility] were reviewed with her, with + ve , tramadol. No chest pain, shortness of breath and no palpitations  Denies abdominal pain or nausea vomiting. She is here  Today with her daughter. PATIENT does not  have cravings     Denies any recent  street drug intake, 2019    Reviewed patient drug screen with the pt today ,    Will send it out for further studies. Patient completed her counselng as she has been in various programs over last several years, recently at [facility] START OF CARE 2020    OARRS report reviewed and d/w pt today , risk score of 610  Also had extensive last Urine tox screen from [facility] , +ve for amphetamines, uses aderral old supply on a prn basis     Review of Systems     Refer to HPI    COWS  3    Prior to Visit Medications    Medication Sig Taking?  Authorizing Provider   cloNIDine (CATAPRES) 0.1 MG tablet Take 1 tablet by mouth 2 times daily as needed for High Blood Pressure Yes Noreen Chapman MD        Social History     Tobacco Use    Smoking status: Current Every Day Smoker     Packs/day: 0.25     Years: 1.00     Pack years: 0.25     Types: Cigarettes    Smokeless tobacco: Never Used   Substance Use Topics    Alcohol use: Not Currently        Vitals:    21 1545   BP: 104/70   Site: Right Upper Arm   Pulse: 86   Temp: 97.7 °F (36.5 °C)   Weight: 125 lb 6.4 oz (56.9 kg)   Height: 5' 7.01\" (1.702 m)     Estimated body mass index is 19.64 kg/m² as calculated from the following:    Height as of this encounter: 5' 7.01\" (1.702 m). Weight as of this encounter: 125 lb 6.4 oz (56.9 kg). Physical Exam     Pupils normal size and reacting to light,  She is alert and oriented x 3, abd soft NT, BS, Lungs CTA,   Reg HS          ASSESSMENT/PLAN:     Diagnosis Orders   1. Severe opioid use disorder (HCC)  POCT Rapid Drug Screen     Pt could not tolerate the sublocade shot, given 3 weeks ago. So she is back on 8 mg suboxone increase to BID, and follow up  In 7 days      2. Hx of ? ADD - will refer to psychiatry here in town. [facility]  after her  . She uses adderall on  a prn basis, before being on  percocet. GOAL:  To enhance patient recovery through the use of medication assisted treatment to improve overall quality of life. RTO in 7 days  and again encouraged to drink plenty of fluids mainly water before each office visit as she needs to give a urine sample. suboxone 8 mg BID  # 14 , for 7 days. An electronic signature was used to authenticate this note.     --Becca Lake MD on 2021 at 4:03 PM

## 2021-01-21 ENCOUNTER — OFFICE VISIT (OUTPATIENT)
Dept: INTERNAL MEDICINE CLINIC | Age: 31
End: 2021-01-21
Payer: COMMERCIAL

## 2021-01-21 VITALS
BODY MASS INDEX: 19.9 KG/M2 | DIASTOLIC BLOOD PRESSURE: 70 MMHG | TEMPERATURE: 98.2 F | HEART RATE: 82 BPM | SYSTOLIC BLOOD PRESSURE: 116 MMHG | HEIGHT: 67 IN | WEIGHT: 126.8 LBS

## 2021-01-21 DIAGNOSIS — F11.20 SEVERE OPIOID USE DISORDER (HCC): Primary | ICD-10-CM

## 2021-01-21 PROCEDURE — G8420 CALC BMI NORM PARAMETERS: HCPCS | Performed by: INTERNAL MEDICINE

## 2021-01-21 PROCEDURE — G8484 FLU IMMUNIZE NO ADMIN: HCPCS | Performed by: INTERNAL MEDICINE

## 2021-01-21 PROCEDURE — 4004F PT TOBACCO SCREEN RCVD TLK: CPT | Performed by: INTERNAL MEDICINE

## 2021-01-21 PROCEDURE — 80305 DRUG TEST PRSMV DIR OPT OBS: CPT | Performed by: INTERNAL MEDICINE

## 2021-01-21 PROCEDURE — G8427 DOCREV CUR MEDS BY ELIG CLIN: HCPCS | Performed by: INTERNAL MEDICINE

## 2021-01-21 PROCEDURE — 99213 OFFICE O/P EST LOW 20 MIN: CPT | Performed by: INTERNAL MEDICINE

## 2021-01-21 RX ORDER — BUPRENORPHINE AND NALOXONE 8; 2 MG/1; MG/1
1 FILM, SOLUBLE BUCCAL; SUBLINGUAL 2 TIMES DAILY
Qty: 14 FILM | Refills: 0 | Status: SHIPPED | OUTPATIENT
Start: 2021-01-21 | End: 2021-01-28

## 2021-01-21 NOTE — PROGRESS NOTES
Per vo from Dr Ronny Ruiz drug screen was ordered and resulted. It is positive for BUP. Confirmed with Ngoc Luciano LPN. Dr. Ole Rodriguez was notified.

## 2021-01-21 NOTE — PROGRESS NOTES
2021     Caitlyn Yanez (:  1990) is a 26 y.o. female, here for evaluation of the following medical concerns: MAT      Chronic opiod  Use disorder / suboxone follow up        Here for weekly follow up as she Is on suboxone, missed  The recent appointment 3 days ago, claims could not get   Out of work. \" takes care of my dad \". Last few visits, she could not urinate so was not evaluated  And she was advised to drink plenty of fluids pre each visit  On multiple occasions. She does have sweats at night, no   Urinary symptoms, no cough. She is smoker, trying to quit  1 PPD Q 3 days. Her last extensive Urine studies, from   [facility] were reviewed with her, with + ve , tramadol. No chest pain, shortness of breath and no palpitations  Denies abdominal pain or nausea vomiting. She is here  Today with her daughter. Overall she is feeling better   With last dose of increase BP 8 mg BID films. She feels  Could not tolerate the sublocade shots. PATIENT does not  have cravings     Denies any recent  street drug intake, 2019    Reviewed patient drug screen with the pt today ,    Will send it out for further studies. , Postivie for BP only       Patient completed her counselng as she has been in various programs over last several years, recently at [facility] START OF CARE 2020    OARRS report reviewed and d/w pt today , risk score of 620  Also had extensive last Urine tox screen from [facility] , +ve for amphetamines, uses aderral old supply on a prn basis     Review of Systems     Refer to HPI    COWS  3    Prior to Visit Medications    Medication Sig Taking? Authorizing Provider   buprenorphine-naloxone (SUBOXONE) 8-2 MG FILM SL film Place 1 Film under the tongue 2 times daily for 7 days.  Yes Yared Han MD   cloNIDine (CATAPRES) 0.1 MG tablet Take 1 tablet by mouth 2 times daily as needed for High Blood Pressure Yes Noreen Chapman MD        Social History Tobacco Use    Smoking status: Current Every Day Smoker     Packs/day: 0.25     Years: 1.00     Pack years: 0.25     Types: Cigarettes    Smokeless tobacco: Never Used   Substance Use Topics    Alcohol use: Not Currently        Vitals:    21 1539   BP: 116/70   Site: Right Upper Arm   Pulse: 82   Temp: 98.2 °F (36.8 °C)   Weight: 126 lb 12.8 oz (57.5 kg)   Height: 5' 7.01\" (1.702 m)     Estimated body mass index is 19.85 kg/m² as calculated from the following:    Height as of this encounter: 5' 7.01\" (1.702 m). Weight as of this encounter: 126 lb 12.8 oz (57.5 kg). Physical Exam     Pupils normal size and reacting to light,  She is alert and oriented x 3, abd soft NT, BS, Lungs CTA,   Reg HS          ASSESSMENT/PLAN:     Diagnosis Orders   1. Severe opioid use disorder (HCC)  POCT Rapid Drug Screen    buprenorphine-naloxone (SUBOXONE) 8-2 MG FILM SL film     Pt could not tolerate the sublocade shot, given 3 weeks ago. So she is back on 8 mg suboxone increased to BID on the last visit and follow up in one week. 2. Hx of ? ADD - will refer to psychiatry here in town. [facility]  after her  . She uses adderall on  a prn basis, before being on  percocet. GOAL:  To enhance patient recovery through the use of medication assisted treatment to improve overall quality of life. RTO in 7 days  and again encouraged to drink plenty of fluids mainly water before each office visit as she needs to give a urine sample. suboxone 8 mg BID  # 14 , for 7 days. An electronic signature was used to authenticate this note.     --Gordo Willoughby MD on 2021 at 4:11 PM

## 2021-01-29 ENCOUNTER — OFFICE VISIT (OUTPATIENT)
Dept: INTERNAL MEDICINE CLINIC | Age: 31
End: 2021-01-29
Payer: COMMERCIAL

## 2021-01-29 VITALS
BODY MASS INDEX: 20.28 KG/M2 | TEMPERATURE: 98.1 F | SYSTOLIC BLOOD PRESSURE: 126 MMHG | HEART RATE: 89 BPM | HEIGHT: 67 IN | WEIGHT: 129.2 LBS | DIASTOLIC BLOOD PRESSURE: 80 MMHG

## 2021-01-29 DIAGNOSIS — F11.20 SEVERE OPIOID USE DISORDER (HCC): Primary | ICD-10-CM

## 2021-01-29 PROCEDURE — 80305 DRUG TEST PRSMV DIR OPT OBS: CPT | Performed by: INTERNAL MEDICINE

## 2021-01-29 PROCEDURE — G8484 FLU IMMUNIZE NO ADMIN: HCPCS | Performed by: INTERNAL MEDICINE

## 2021-01-29 PROCEDURE — G8428 CUR MEDS NOT DOCUMENT: HCPCS | Performed by: INTERNAL MEDICINE

## 2021-01-29 PROCEDURE — G8420 CALC BMI NORM PARAMETERS: HCPCS | Performed by: INTERNAL MEDICINE

## 2021-01-29 PROCEDURE — 4004F PT TOBACCO SCREEN RCVD TLK: CPT | Performed by: INTERNAL MEDICINE

## 2021-01-29 PROCEDURE — 99213 OFFICE O/P EST LOW 20 MIN: CPT | Performed by: INTERNAL MEDICINE

## 2021-01-29 RX ORDER — BUPRENORPHINE AND NALOXONE 8; 2 MG/1; MG/1
1 FILM, SOLUBLE BUCCAL; SUBLINGUAL 2 TIMES DAILY
Qty: 22 FILM | Refills: 0 | Status: SHIPPED | OUTPATIENT
Start: 2021-01-29 | End: 2021-02-26 | Stop reason: SDUPTHER

## 2021-01-29 NOTE — PROGRESS NOTES
Per vo from /Urine drug screen was ordered and resulted. It is pos for BUP and ETG. Dr. Danny Decker was notified.

## 2021-01-29 NOTE — PROGRESS NOTES
2021     Gerard Arceo (:  1990) is a 26 y.o. female, here for evaluation of the following medical concerns: MAT      Chronic opiod  Use disorder / suboxone follow up        Here for weekly follow up as she Is on suboxone, missed  The recent appointment 3 days ago, claims could not get   Out of work. \" takes care of my dad \". Last few visits, she could not urinate so was not evaluated  And she was advised to drink plenty of fluids pre each visit  On multiple occasions. She does have sweats at night, no   Urinary symptoms, no cough. She is smoker, trying to quit  1 PPD Q 3 days. Her last extensive Urine studies, from   [facility] were reviewed with her, with + ve , tramadol. In the past. Denies any recent intake of any drugs. No chest pain, shortness of breath and no palpitations  Denies abdominal pain or nausea vomiting. Claims she   Had 1/2 glass of red wine. PATIENT does not  have cravings     Denies any recent  street drug intake, 2019    Reviewed patient drug screen with the pt today ,    Will send it out for further studies. , Postivie for BP and Etoh      Patient completed her counselng as she has been in various programs over last several years, recently at [facility] START OF CARE 2020    OARRS report reviewed and d/w pt today , risk score of 640  Also had extensive last Urine tox screen from [facility] , +ve for amphetamines, uses aderral old supply on a prn basis     Review of Systems     Refer to HPI    COWS  3    Prior to Visit Medications    Medication Sig Taking?  Authorizing Provider   cloNIDine (CATAPRES) 0.1 MG tablet Take 1 tablet by mouth 2 times daily as needed for High Blood Pressure Yes Quita Barrera MD        Social History     Tobacco Use    Smoking status: Current Every Day Smoker     Packs/day: 0.25     Years: 1.00     Pack years: 0.25     Types: Cigarettes    Smokeless tobacco: Never Used   Substance Use Topics  Alcohol use: Not Currently        Vitals:    21 0941   BP: 126/80   Site: Right Upper Arm   Pulse: 89   Temp: 98.1 °F (36.7 °C)   Weight: 129 lb 3.2 oz (58.6 kg)   Height: 5' 7.01\" (1.702 m)     Estimated body mass index is 20.23 kg/m² as calculated from the following:    Height as of this encounter: 5' 7.01\" (1.702 m). Weight as of this encounter: 129 lb 3.2 oz (58.6 kg). Physical Exam     Pupils normal size and reacting to light,  She is alert and oriented x 3, abd soft NT, BS, Lungs CTA,   Reg HS          ASSESSMENT/PLAN:     Diagnosis Orders   1. Severe opioid use disorder (HCC)  POCT Rapid Drug Screen     Pt could not tolerate the sublocade shot. So she is back on 8 mg suboxone increased to BID on the last visit and follow up in one week. 2. Hx of ? ADD - will refer to psychiatry here in town. [facility]  after her  . She uses adderall on  a prn basis, before being on  percocet. GOAL:  To enhance patient recovery through the use of medication assisted treatment to improve overall quality of life. RTO in 7 days  and again encouraged to drink plenty of fluids mainly water before each office visit as she needs to give a urine sample. suboxone 8 mg BID  # 22 , for 11 days. An electronic signature was used to authenticate this note.     --Cornel Lo MD on 2021 at 9:51 AM

## 2021-02-26 ENCOUNTER — OFFICE VISIT (OUTPATIENT)
Dept: INTERNAL MEDICINE CLINIC | Age: 31
End: 2021-02-26
Payer: COMMERCIAL

## 2021-02-26 VITALS — BODY MASS INDEX: 19.34 KG/M2 | TEMPERATURE: 99 F | WEIGHT: 123.2 LBS | HEIGHT: 67 IN

## 2021-02-26 DIAGNOSIS — F11.20 SEVERE OPIOID USE DISORDER (HCC): Primary | ICD-10-CM

## 2021-02-26 PROCEDURE — G8427 DOCREV CUR MEDS BY ELIG CLIN: HCPCS | Performed by: INTERNAL MEDICINE

## 2021-02-26 PROCEDURE — 80305 DRUG TEST PRSMV DIR OPT OBS: CPT | Performed by: INTERNAL MEDICINE

## 2021-02-26 PROCEDURE — G8484 FLU IMMUNIZE NO ADMIN: HCPCS | Performed by: INTERNAL MEDICINE

## 2021-02-26 PROCEDURE — 99213 OFFICE O/P EST LOW 20 MIN: CPT | Performed by: INTERNAL MEDICINE

## 2021-02-26 PROCEDURE — 4004F PT TOBACCO SCREEN RCVD TLK: CPT | Performed by: INTERNAL MEDICINE

## 2021-02-26 PROCEDURE — G8420 CALC BMI NORM PARAMETERS: HCPCS | Performed by: INTERNAL MEDICINE

## 2021-02-26 RX ORDER — BUPRENORPHINE AND NALOXONE 8; 2 MG/1; MG/1
1 FILM, SOLUBLE BUCCAL; SUBLINGUAL 2 TIMES DAILY
Qty: 12 FILM | Refills: 0 | Status: SHIPPED | OUTPATIENT
Start: 2021-02-26 | End: 2021-03-04

## 2021-02-26 NOTE — PROGRESS NOTES
Per vo from Dr. Mars Littlejohn drug screen was ordered and resulted. It is pos for AMP and BUP. Dr. Danny Decker verified results.
status: Current Every Day Smoker     Packs/day: 0.25     Years: 1.00     Pack years: 0.25     Types: Cigarettes    Smokeless tobacco: Never Used   Substance Use Topics    Alcohol use: Not Currently        Vitals:    21 1117   Temp: 99 °F (37.2 °C)   Weight: 123 lb 3.2 oz (55.9 kg)   Height: 5' 7.01\" (1.702 m)     Estimated body mass index is 19.29 kg/m² as calculated from the following:    Height as of this encounter: 5' 7.01\" (1.702 m). Weight as of this encounter: 123 lb 3.2 oz (55.9 kg). Physical Exam     Pupils normal size and reacting to light,  She is alert and oriented x 3, abd soft NT, BS, Lungs CTA,   Reg HS          ASSESSMENT/PLAN:     Diagnosis Orders   1. Severe opioid use disorder (HCC)  POCT Rapid Drug Screen     Pt could not tolerate the sublocade shot. So she is back on 8 mg suboxone increased to BID on the last visit and follow up in about a week. Recommend compliance with medical care. 2. Hx of ? ADD - will refer to psychiatry here in town. [facility]  . She uses adderall on  a prn basis, before being on  percocet. She will be seeing dr. Eleuterio Crowe in  , on a cancellation list.       GOAL:  To enhance patient recovery through the use of medication assisted treatment to improve overall quality of life. RTO in 6 days  and again encouraged to drink plenty of fluids mainly water before each office visit as she needs to give a urine sample. Refilled suboxone 8 mg BID  # 12 , for 6 days. An electronic signature was used to authenticate this note.     --Darylene Kobs, MD on 2021 at 11:32 AM

## 2021-03-09 ENCOUNTER — TELEPHONE (OUTPATIENT)
Dept: INTERNAL MEDICINE CLINIC | Age: 31
End: 2021-03-09

## 2021-03-09 ENCOUNTER — OFFICE VISIT (OUTPATIENT)
Dept: INTERNAL MEDICINE CLINIC | Age: 31
End: 2021-03-09
Payer: COMMERCIAL

## 2021-03-09 VITALS
HEIGHT: 67 IN | SYSTOLIC BLOOD PRESSURE: 129 MMHG | BODY MASS INDEX: 19.12 KG/M2 | WEIGHT: 121.8 LBS | DIASTOLIC BLOOD PRESSURE: 69 MMHG | TEMPERATURE: 97.1 F | HEART RATE: 102 BPM

## 2021-03-09 DIAGNOSIS — R53.83 OTHER FATIGUE: ICD-10-CM

## 2021-03-09 DIAGNOSIS — F11.20 SEVERE OPIOID USE DISORDER (HCC): Primary | ICD-10-CM

## 2021-03-09 PROCEDURE — G8420 CALC BMI NORM PARAMETERS: HCPCS | Performed by: INTERNAL MEDICINE

## 2021-03-09 PROCEDURE — G8427 DOCREV CUR MEDS BY ELIG CLIN: HCPCS | Performed by: INTERNAL MEDICINE

## 2021-03-09 PROCEDURE — 99214 OFFICE O/P EST MOD 30 MIN: CPT | Performed by: INTERNAL MEDICINE

## 2021-03-09 PROCEDURE — 4004F PT TOBACCO SCREEN RCVD TLK: CPT | Performed by: INTERNAL MEDICINE

## 2021-03-09 PROCEDURE — 80305 DRUG TEST PRSMV DIR OPT OBS: CPT | Performed by: INTERNAL MEDICINE

## 2021-03-09 PROCEDURE — G8484 FLU IMMUNIZE NO ADMIN: HCPCS | Performed by: INTERNAL MEDICINE

## 2021-03-09 RX ORDER — BUPRENORPHINE AND NALOXONE 8; 2 MG/1; MG/1
1 FILM, SOLUBLE BUCCAL; SUBLINGUAL 2 TIMES DAILY
COMMUNITY
End: 2021-03-09 | Stop reason: SDUPTHER

## 2021-03-09 RX ORDER — BUPRENORPHINE AND NALOXONE 8; 2 MG/1; MG/1
1 FILM, SOLUBLE BUCCAL; SUBLINGUAL 2 TIMES DAILY
Qty: 16 FILM | Refills: 0 | Status: SHIPPED | OUTPATIENT
Start: 2021-03-09 | End: 2021-03-17

## 2021-03-09 NOTE — TELEPHONE ENCOUNTER
[employer] called asking to confirm Dr. Kianna Haque X#.  I called them and gave them his X#.  I spoke to atiya

## 2021-03-09 NOTE — PROGRESS NOTES
Per vo from Dr. Garo Poe drug screen was ordered and resulted. It is pos for BUP and AMP. Dr. Brian Carreon was notified.

## 2021-03-09 NOTE — PROGRESS NOTES
3/9/2021     Carlo Montes (:  1990) is a 26 y.o. female, here for evaluation of the following medical concerns: MAT      Chronic opiod  Use disorder / suboxone follow up          Last seen in the office on 21 here for scheduled appt. , rescheduled from yesterday . Claims she sprained her ankle,   Tolerated her suboxone ok, . Her mother got exposed to covid  So she is getting tested, and Delbert Moreno is interested. Denies use   Of street drugs, but takes occasionally takes adderrall,  \" her old supply \"  denies buying it off the street  . Scheduled to see a psychiatrist in . She complains of fatigue, and her family got diagnosed with Med Ca . Denies any rash , SOB, no abd pain or any fever or chills. PATIENT does not  have cravings     Denies any recent  street drug intake, 2019    Reviewed patient drug screen with the pt today ,  Postivie for BP and amp. Last merridian urine studies x 2 noted. Patient completed her counselng as she has been in various programs over last several years, recently at [facility] START OF CARE 2020    OARRS report reviewed and d/w pt today , risk score of 660      Review of Systems     Refer to HPI    COWS  2        Prior to Visit Medications    Medication Sig Taking? Authorizing Provider   buprenorphine-naloxone (SUBOXONE) 8-2 MG FILM SL film Place 1 Film under the tongue 2 times daily.  Yes Historical Provider, MD   cloNIDine (CATAPRES) 0.1 MG tablet Take 1 tablet by mouth 2 times daily as needed for High Blood Pressure Yes Yared Han MD        Social History     Tobacco Use    Smoking status: Current Every Day Smoker     Packs/day: 0.25     Years: 1.00     Pack years: 0.25     Types: Cigarettes    Smokeless tobacco: Never Used   Substance Use Topics    Alcohol use: Not Currently        Vitals:    21 1509   BP: 129/69   Site: Right Upper Arm   Pulse: 102   Temp: 97.1 °F (36.2 °C)   Weight: 121 lb 12.8 oz (55.2 kg) Height: 5' 7.01\" (1.702 m)     Estimated body mass index is 19.07 kg/m² as calculated from the following:    Height as of this encounter: 5' 7.01\" (1.702 m). Weight as of this encounter: 121 lb 12.8 oz (55.2 kg). Physical Exam     Pupils normal size and reacting to light,  She is alert and oriented x 3, abd soft NT, BS, Lungs CTA,          ASSESSMENT/PLAN:     Diagnosis Orders   1. Severe opioid use disorder (HCC)  POCT Rapid Drug Screen     Pt could not tolerate the sublocade shot , in the past .  We had to switch her back to Suboxone film 8 mg twice daily, well without much difficulty. Both with her about compliance issues and the fact she needs to drink plenty of water so that she can give a urine sample when she does come to the office visits      2. Hx of ? ADD - referred  to psychiatry here in [facility]. [facility]  [facility] her  . She uses adderall on  a prn basis, before being on  percocet. She will be seeing dr. Bob Albert in  , on a cancellation list.     An order for covid testing , and check for mono due to family being exposed and diagnosed with it, her mom and sister. Check for EBV    GOAL:  To enhance patient recovery through the use of medication assisted treatment to improve overall quality of life. RTO in 8 days  and again encouraged to drink plenty of fluids mainly water before each office visit as she needs to give a urine sample. Refilled suboxone 8 mg film BID   # 16 , for 8 days and she will see Amy Sparrow at that time, and then back with me the following   Week. An electronic signature was used to authenticate this note.     --Alfonzo Martinez MD on 3/9/2021 at 3:39 PM

## 2021-03-24 ENCOUNTER — OFFICE VISIT (OUTPATIENT)
Dept: INTERNAL MEDICINE CLINIC | Age: 31
End: 2021-03-24
Payer: COMMERCIAL

## 2021-03-24 VITALS
HEART RATE: 88 BPM | BODY MASS INDEX: 20.09 KG/M2 | SYSTOLIC BLOOD PRESSURE: 130 MMHG | WEIGHT: 128 LBS | TEMPERATURE: 98.2 F | HEIGHT: 67 IN | DIASTOLIC BLOOD PRESSURE: 82 MMHG

## 2021-03-24 DIAGNOSIS — Z79.899 ENCOUNTER FOR MONITORING SUBOXONE MAINTENANCE THERAPY: ICD-10-CM

## 2021-03-24 DIAGNOSIS — Z51.81 ENCOUNTER FOR MONITORING SUBOXONE MAINTENANCE THERAPY: ICD-10-CM

## 2021-03-24 DIAGNOSIS — F11.20 SEVERE OPIOID USE DISORDER (HCC): Primary | ICD-10-CM

## 2021-03-24 PROCEDURE — G8420 CALC BMI NORM PARAMETERS: HCPCS | Performed by: NURSE PRACTITIONER

## 2021-03-24 PROCEDURE — 4004F PT TOBACCO SCREEN RCVD TLK: CPT | Performed by: NURSE PRACTITIONER

## 2021-03-24 PROCEDURE — G8484 FLU IMMUNIZE NO ADMIN: HCPCS | Performed by: NURSE PRACTITIONER

## 2021-03-24 PROCEDURE — G8427 DOCREV CUR MEDS BY ELIG CLIN: HCPCS | Performed by: NURSE PRACTITIONER

## 2021-03-24 PROCEDURE — 99213 OFFICE O/P EST LOW 20 MIN: CPT | Performed by: NURSE PRACTITIONER

## 2021-03-24 PROCEDURE — 80305 DRUG TEST PRSMV DIR OPT OBS: CPT | Performed by: NURSE PRACTITIONER

## 2021-03-24 RX ORDER — BUPRENORPHINE AND NALOXONE 8; 2 MG/1; MG/1
1 FILM, SOLUBLE BUCCAL; SUBLINGUAL 2 TIMES DAILY
Qty: 14 FILM | Refills: 0 | Status: SHIPPED | OUTPATIENT
Start: 2021-03-24 | End: 2021-03-31

## 2021-03-24 NOTE — PROGRESS NOTES
[address] [place] [address] INTERNAL MEDICINE  [address]  Dept: [phone]  Dept Fax: [phone]  Loc: [phone]     Visit Date:  3/24/2021    Patient:  Adi Colon  YOB: 1990    HPI:     Chief Complaint   Patient presents with    Drug Problem       HPI    Here for MAT follow up. Routine pt of Dr. Lauren Haskins. States she is feeling sick, she has been out of Suboxone since Saturday. Last Rx 3/9/2021, for 8 days. She was due to return and states issues with juggling her appt and responsibilities. UDS amp, bup. She is fatigued, chills, achy, no appetite. Her Dad was in the hospital, now in [facility] Controlled Substance Monitoring:    Acute and Chronic Pain Monitoring:   RX Monitoring 3/24/2021   Periodic Controlled Substance Monitoring Possible medication side effects, risk of tolerance/dependence & alternative treatments discussed. ;No signs of potential drug abuse or diversion identified. ;Random urine drug screen sent today. ;Assessed functional status. Medications    Current Outpatient Medications:     buprenorphine-naloxone (SUBOXONE) 8-2 MG FILM SL film, Place 1 Film under the tongue 2 times daily for 7 days. , Disp: 14 Film, Rfl: 0    cloNIDine (CATAPRES) 0.1 MG tablet, Take 1 tablet by mouth 2 times daily as needed for High Blood Pressure, Disp: 60 tablet, Rfl: 3    The patient has No Known Allergies. Past Medical History  Davey Green  has a past medical history of MRSA (methicillin resistant staph aureus) culture positive. Past Surgical History  The patient  has a past surgical history that includes Ankle surgery. Family History  This patient's family history includes Cancer in her paternal grandmother; Emphysema in her maternal grandmother; Heart Attack in her paternal grandfather; Seizures in her father. Social History  Davey Green  reports that she has been smoking cigarettes.  She distress. Breath sounds: Normal breath sounds. No wheezing or rales. Skin:     General: Skin is warm and dry. Coloration: Skin is not pale. Findings: No erythema. Comments: Skin on face appears mildly sweaty   Neurological:      Mental Status: She is alert and oriented to person, place, and time. Labs Reviewed 3/24/2021:    No results found for: WBC, HGB, HCT, PLT, CHOL, TRIG, HDL, LDLDIRECT, ALT, AST, NA, K, CL, CREATININE, BUN, CO2, TSH, PSA, INR, GLUF, LABA1C, LABMICR    Assessment/Plan      1. Severe opioid use disorder (HCC)  UDS bup, amp  States she took an old adderall, however states this was not assistive to her. Advised against taking this medication. OARRS appropriate to treatment, no discrepancy identified  Cravings/urges controlled, no withdrawal symptoms. On Suboxone 8 mg BID. Continue current dosing  Counseling arrangements advised. Follow up with Dr. Young Quinones in 7 days.   - POCT Rapid Drug Screen  - buprenorphine-naloxone (SUBOXONE) 8-2 MG FILM SL film; Place 1 Film under the tongue 2 times daily for 7 days. Dispense: 14 Film; Refill: 0    2. Encounter for monitoring Suboxone maintenance therapy  - buprenorphine-naloxone (SUBOXONE) 8-2 MG FILM SL film; Place 1 Film under the tongue 2 times daily for 7 days. Dispense: 14 Film; Refill: 0      Return in about 1 week (around 3/31/2021) for OBOT. Patient given educational materials - see patient instructions. Discussed use, benefit, and side effects of prescribed medications. All patient questions answered. Pt voiced understanding.         Electronically signed JAQUELINE Arzate CNP on 3/24/21 at 4:23 PM EDT

## 2021-03-24 NOTE — PROGRESS NOTES
Per vo from [facility] cnp, urine drug screen was ordered and resulted. It is positive or AMP, BUP. Results were verified with [employer] Yahaira LPMEME. Dom Murrell was notified.

## 2021-04-04 ASSESSMENT — ENCOUNTER SYMPTOMS
DIARRHEA: 0
NAUSEA: 0
VOMITING: 0
ABDOMINAL PAIN: 0
SHORTNESS OF BREATH: 0
COUGH: 0

## 2021-04-07 ENCOUNTER — OFFICE VISIT (OUTPATIENT)
Dept: INTERNAL MEDICINE CLINIC | Age: 31
End: 2021-04-07
Payer: COMMERCIAL

## 2021-04-07 VITALS
TEMPERATURE: 98 F | SYSTOLIC BLOOD PRESSURE: 132 MMHG | HEIGHT: 67 IN | WEIGHT: 127.2 LBS | DIASTOLIC BLOOD PRESSURE: 60 MMHG | HEART RATE: 106 BPM | BODY MASS INDEX: 19.97 KG/M2

## 2021-04-07 DIAGNOSIS — Z79.899 ENCOUNTER FOR MONITORING SUBOXONE MAINTENANCE THERAPY: ICD-10-CM

## 2021-04-07 DIAGNOSIS — Z51.81 ENCOUNTER FOR MONITORING SUBOXONE MAINTENANCE THERAPY: ICD-10-CM

## 2021-04-07 DIAGNOSIS — F11.20 SEVERE OPIOID USE DISORDER (HCC): Primary | ICD-10-CM

## 2021-04-07 DIAGNOSIS — J30.1 SEASONAL ALLERGIC RHINITIS DUE TO POLLEN: ICD-10-CM

## 2021-04-07 PROCEDURE — 80305 DRUG TEST PRSMV DIR OPT OBS: CPT | Performed by: NURSE PRACTITIONER

## 2021-04-07 PROCEDURE — G8420 CALC BMI NORM PARAMETERS: HCPCS | Performed by: NURSE PRACTITIONER

## 2021-04-07 PROCEDURE — 4004F PT TOBACCO SCREEN RCVD TLK: CPT | Performed by: NURSE PRACTITIONER

## 2021-04-07 PROCEDURE — G8427 DOCREV CUR MEDS BY ELIG CLIN: HCPCS | Performed by: NURSE PRACTITIONER

## 2021-04-07 PROCEDURE — 99214 OFFICE O/P EST MOD 30 MIN: CPT | Performed by: NURSE PRACTITIONER

## 2021-04-07 RX ORDER — FLUTICASONE PROPIONATE 50 MCG
1 SPRAY, SUSPENSION (ML) NASAL DAILY
Qty: 1 BOTTLE | Refills: 0 | Status: SHIPPED | OUTPATIENT
Start: 2021-04-07

## 2021-04-07 RX ORDER — BUPRENORPHINE AND NALOXONE 8; 2 MG/1; MG/1
1 FILM, SOLUBLE BUCCAL; SUBLINGUAL 2 TIMES DAILY
Qty: 14 FILM | Refills: 0 | Status: CANCELLED | OUTPATIENT
Start: 2021-04-07 | End: 2021-04-14

## 2021-04-07 RX ORDER — BUPRENORPHINE AND NALOXONE 8; 2 MG/1; MG/1
1 FILM, SOLUBLE BUCCAL; SUBLINGUAL 2 TIMES DAILY
COMMUNITY
End: 2021-04-08 | Stop reason: SDUPTHER

## 2021-04-07 RX ORDER — BUPRENORPHINE AND NALOXONE 4; 1 MG/1; MG/1
1 FILM, SOLUBLE BUCCAL; SUBLINGUAL 2 TIMES DAILY
Qty: 2 FILM | Refills: 0 | Status: SHIPPED | OUTPATIENT
Start: 2021-04-07 | End: 2021-04-08

## 2021-04-07 NOTE — PROGRESS NOTES
Verbal order per Ethan Dickerson CMP for urine drug screen. Positive for AMP , BUP , MOP. Verified results with Nino NAJERA.

## 2021-04-07 NOTE — PROGRESS NOTES
[address] [address] INTERNAL MEDICINE  [address]  Dept: [phone]  Dept Fax: [phone]  Loc: [phone]     Visit Date:  4/7/2021    Patient:  Kaity Freed  YOB: 1990    HPI:     Chief Complaint   Patient presents with    Drug Problem     f/u        HPI    Here for MAT follow up. Routine patient of Dr. Fatoumata Shah. UDS amp, bup, MOP  On Suboxone 8 mg BID. She took some cough syrup her Mother had, has codeine in this. Her last Suboxone was Sunday. Feels hot and low appetite. Sore throat, fatigue, headaches. No fever. Controlled Substance Monitoring:    Acute and Chronic Pain Monitoring:   RX Monitoring 4/7/2021   Periodic Controlled Substance Monitoring Possible medication side effects, risk of tolerance/dependence & alternative treatments discussed. ;No signs of potential drug abuse or diversion identified. ;Assessed functional status. ;Random urine drug screen sent today. Medications    Current Outpatient Medications:     buprenorphine-naloxone (SUBOXONE) 4-1 MG FILM SL film, Place 1 Film under the tongue 2 times daily for 1 day., Disp: 2 Film, Rfl: 0    fluticasone (FLONASE) 50 MCG/ACT nasal spray, 1 spray by Each Nostril route daily, Disp: 1 Bottle, Rfl: 0    cloNIDine (CATAPRES) 0.1 MG tablet, Take 1 tablet by mouth 2 times daily as needed for High Blood Pressure, Disp: 60 tablet, Rfl: 3    buprenorphine-naloxone (SUBOXONE) 8-2 MG FILM SL film, Place 1 Film under the tongue 2 times daily. , Disp: , Rfl:     The patient has No Known Allergies. Past Medical History  Juanis December  has a past medical history of MRSA (methicillin resistant staph aureus) culture positive. Past Surgical History  The patient  has a past surgical history that includes Ankle surgery.     Family History  This patient's family history includes Cancer in her paternal grandmother; Emphysema in her maternal grandmother; Heart Attack in her paternal grandfather; Seizures in her father. Social History  Lucero Luna  reports that she has been smoking cigarettes. She has a 0.25 pack-year smoking history. She has never used smokeless tobacco. She reports previous alcohol use. She reports previous drug use. Health Maintenance:    Health Maintenance   Topic Date Due    Hepatitis C screen  Never done    Varicella vaccine (1 of 2 - 2-dose childhood series) Never done    Pneumococcal 0-64 years Vaccine (1 of 1 - PPSV23) Never done    HIV screen  Never done    COVID-19 Vaccine (1) Never done    DTaP/Tdap/Td vaccine (1 - Tdap) Never done    Cervical cancer screen  Never done    Flu vaccine (Season Ended) 09/01/2021    Hepatitis A vaccine  Aged Out    Hepatitis B vaccine  Aged Out    Hib vaccine  Aged Out    Meningococcal (ACWY) vaccine  Aged Out       Subjective:      Review of Systems   Constitutional: Negative for chills, fatigue and fever. HENT: Positive for congestion, postnasal drip, rhinorrhea and sore throat. Respiratory: Negative for cough and shortness of breath. Cardiovascular: Negative for chest pain and leg swelling. Gastrointestinal: Negative for abdominal pain, diarrhea, nausea and vomiting. Genitourinary: Negative for difficulty urinating. Musculoskeletal: Negative for arthralgias and myalgias. Skin: Negative for rash and wound. Psychiatric/Behavioral: Negative for agitation, behavioral problems and dysphoric mood. Objective:     /60 (Site: Right Upper Arm, Position: Sitting, Cuff Size: Medium Adult)   Pulse 106   Temp 98 °F (36.7 °C)   Ht 5' 7.01\" (1.702 m)   Wt 127 lb 3.2 oz (57.7 kg)   BMI 19.92 kg/m²     Physical Exam  Vitals signs reviewed. Constitutional:       Appearance: She is well-developed. Comments: Appears mildly sweaty and fatigued   HENT:      Head: Normocephalic and atraumatic.       Right Ear: Tympanic membrane and ear canal normal.      Left Ear: Tympanic membrane and ear canal normal.      Nose: Congestion (Pale boggy nasal turbinates) and rhinorrhea (Clear) present. Mouth/Throat:      Mouth: Mucous membranes are moist.      Pharynx: Oropharynx is clear. No oropharyngeal exudate or posterior oropharyngeal erythema. Eyes:      General: No scleral icterus. Right eye: No discharge. Left eye: No discharge. Pupils: Pupils are equal, round, and reactive to light. Cardiovascular:      Rate and Rhythm: Normal rate and regular rhythm. Heart sounds: Normal heart sounds. No murmur. No friction rub. No gallop. Pulmonary:      Effort: Pulmonary effort is normal. No respiratory distress. Breath sounds: Normal breath sounds. No wheezing or rales. Abdominal:      General: There is no distension. Palpations: Abdomen is soft. Tenderness: There is no abdominal tenderness. Skin:     General: Skin is warm and dry. Coloration: Skin is not pale. Findings: No erythema. Neurological:      Mental Status: She is alert and oriented to person, place, and time. Psychiatric:      Comments: Slightly anxious         Labs Reviewed 4/7/2021:    No results found for: WBC, HGB, HCT, PLT, CHOL, TRIG, HDL, LDLDIRECT, ALT, AST, NA, K, CL, CREATININE, BUN, CO2, TSH, PSA, INR, GLUF, LABA1C, LABMICR    Assessment/Plan      1. Severe opioid use disorder (HCC)  UDS amp, bup, MOP  Reports use of codeine cough syrup yesterday  Discussed this finding with Dr. Lauryn Daniel appropriate to treatment, no discrepancy identified  Cravings/urges controlled, no withdrawal symptoms. On Suboxone 8 milligrams twice daily. We will give 2 doses 4 mg each first 1 to be taken 24 hours after last opiate ingestion. Encouraged to setting up counseling  Follow up with me in 1 days.   - POCT Rapid Drug Screen  - buprenorphine-naloxone (SUBOXONE) 4-1 MG FILM SL film; Place 1 Film under the tongue 2 times daily for 1 day.   Dispense: 2 Film; Refill: 0 2. Encounter for monitoring Suboxone maintenance therapy  - buprenorphine-naloxone (SUBOXONE) 4-1 MG FILM SL film; Place 1 Film under the tongue 2 times daily for 1 day. Dispense: 2 Film; Refill: 0    3. Seasonal allergic rhinitis due to pollen  Can use Mucinex DM for cough and to thin secretions if needed  Plenty of fluids, rest.   Tylenol and/or Ibuprofen for fever/body aches  Salt water gargles for sore throat  Saline nasal spray 2 sprays each nostril 2-3 times daily  - fluticasone (FLONASE) 50 MCG/ACT nasal spray; 1 spray by Each Nostril route daily  Dispense: 1 Bottle; Refill: 0      No follow-ups on file. Patient given educational materials - see patient instructions. Discussed use, benefit, and side effects of prescribed medications. All patient questions answered. Pt voiced understanding.       Electronically signed JAQUELINE Mcgregor CNP on 4/7/21 at 4:13 PM EDT

## 2021-04-07 NOTE — PATIENT INSTRUCTIONS
Can use Mucinex DM for cough and to thin secretions if needed  Plenty of fluids, rest.   Tylenol and/or Ibuprofen for fever/body aches  Salt water gargles for sore throat  Saline nasal spray 2 sprays each nostril 2-3 times daily    Restart Suboxone 4 mg tonight 8 pm and tomorrow am.

## 2021-04-08 ENCOUNTER — OFFICE VISIT (OUTPATIENT)
Dept: INTERNAL MEDICINE CLINIC | Age: 31
End: 2021-04-08
Payer: COMMERCIAL

## 2021-04-08 VITALS
SYSTOLIC BLOOD PRESSURE: 119 MMHG | TEMPERATURE: 97.6 F | WEIGHT: 122.2 LBS | HEART RATE: 83 BPM | BODY MASS INDEX: 19.18 KG/M2 | HEIGHT: 67 IN | DIASTOLIC BLOOD PRESSURE: 74 MMHG

## 2021-04-08 DIAGNOSIS — F11.20 SEVERE OPIOID USE DISORDER (HCC): Primary | ICD-10-CM

## 2021-04-08 DIAGNOSIS — Z79.899 ENCOUNTER FOR MONITORING SUBOXONE MAINTENANCE THERAPY: ICD-10-CM

## 2021-04-08 DIAGNOSIS — Z51.81 ENCOUNTER FOR MONITORING SUBOXONE MAINTENANCE THERAPY: ICD-10-CM

## 2021-04-08 PROCEDURE — 99213 OFFICE O/P EST LOW 20 MIN: CPT | Performed by: NURSE PRACTITIONER

## 2021-04-08 PROCEDURE — 80305 DRUG TEST PRSMV DIR OPT OBS: CPT | Performed by: NURSE PRACTITIONER

## 2021-04-08 PROCEDURE — 4004F PT TOBACCO SCREEN RCVD TLK: CPT | Performed by: NURSE PRACTITIONER

## 2021-04-08 PROCEDURE — G8420 CALC BMI NORM PARAMETERS: HCPCS | Performed by: NURSE PRACTITIONER

## 2021-04-08 PROCEDURE — G8427 DOCREV CUR MEDS BY ELIG CLIN: HCPCS | Performed by: NURSE PRACTITIONER

## 2021-04-08 RX ORDER — BUPRENORPHINE AND NALOXONE 8; 2 MG/1; MG/1
1 FILM, SOLUBLE BUCCAL; SUBLINGUAL 2 TIMES DAILY
Qty: 14 FILM | Refills: 0 | Status: SHIPPED | OUTPATIENT
Start: 2021-04-08 | End: 2021-04-15

## 2021-04-08 NOTE — PROGRESS NOTES
[address] INTERNAL MED[facility]INE  [address]  Dept: [phone]  Dept Fax: [phone]: [phone]     Visit Date:  4/8/2021    Patient:  Nati Mejía  YOB: 1990    HPI:     Chief Complaint   Patient presents with    Drug Problem       HPI  Here for MAT follow up. Routine patient of Dr. Miroslava Ross. Normally on Suboxone 8 mg BID. She had some codeine cough syrup on Sunday as she was ill, and so had MOP urine yesterday. I ordered Suboxone 4 mg overnight. UDS amp, bup.  Mild withdrawal symptoms have improved today yesterday she was experiencing mild sweats, chills, fatigue, irritability, abdominal cramping she was thought was due to being ill. She appears much improved today. Controlled Substance Monitoring:    Acute and Chronic Pain Monitoring:   RX Monitoring 4/8/2021   Periodic Controlled Substance Monitoring Possible medication side effects, risk of tolerance/dependence & alternative treatments discussed. ;No signs of potential drug abuse or diversion identified. ;Assessed functional status. ;Random urine drug screen sent today. Medications    Current Outpatient Medications:     fluticasone (FLONASE) 50 MCG/ACT nasal spray, 1 spray by Each Nostril route daily, Disp: 1 Bottle, Rfl: 0    cloNIDine (CATAPRES) 0.1 MG tablet, Take 1 tablet by mouth 2 times daily as needed for High Blood Pressure, Disp: 60 tablet, Rfl: 3    The patient has No Known Allergies. Past Medical History  Sherly Garvin  has a past medical history of MRSA (methicillin resistant staph aureus) culture positive. Past Surgical History  The patient  has a past surgical history that includes Ankle surgery. Family History  This patient's family history includes Cancer in her paternal grandmother; Emphysema in her maternal grandmother; Heart Attack in her paternal grandfather; Seizures in her father.     Social History James Pardo  reports that she has been smoking cigarettes. She has a 0.25 pack-year smoking history. She has never used smokeless tobacco. She reports previous alcohol use. She reports previous drug use. Health Maintenance:    Health Maintenance   Topic Date Due    Hepatitis C screen  Never done    Varicella vaccine (1 of 2 - 2-dose childhood series) Never done    Pneumococcal 0-64 years Vaccine (1 of 1 - PPSV23) Never done    HIV screen  Never done    COVID-19 Vaccine (1) Never done    DTaP/Tdap/Td vaccine (1 - Tdap) Never done    Cervical cancer screen  Never done    Flu vaccine (Season Ended) 09/01/2021    Hepatitis A vaccine  Aged Out    Hepatitis B vaccine  Aged Out    Hib vaccine  Aged Out    Meningococcal (ACWY) vaccine  Aged Out       Subjective:      Review of Systems   Constitutional: Negative for chills, fatigue and fever. Respiratory: Negative for cough and shortness of breath. Cardiovascular: Negative for chest pain and leg swelling. Gastrointestinal: Negative for abdominal pain, diarrhea, nausea and vomiting. Genitourinary: Negative for difficulty urinating and dysuria. Musculoskeletal: Negative for arthralgias and myalgias. Skin: Negative for rash and wound. Neurological: Negative for dizziness, weakness, light-headedness and headaches. Psychiatric/Behavioral: Negative for agitation, behavioral problems and dysphoric mood. Objective:     /74 (Site: Right Upper Arm)   Pulse 83   Temp 97.6 °F (36.4 °C)   Ht 5' 7.01\" (1.702 m)   Wt 122 lb 3.2 oz (55.4 kg)   BMI 19.13 kg/m²     Physical Exam  Vitals signs reviewed. Constitutional:       Appearance: She is well-developed. HENT:      Head: Normocephalic and atraumatic. Eyes:      General: No scleral icterus. Right eye: No discharge. Left eye: No discharge. Pupils: Pupils are equal, round, and reactive to light. Cardiovascular:      Rate and Rhythm: Normal rate and regular rhythm. Heart sounds: Normal heart sounds. No murmur. No friction rub. No gallop. Pulmonary:      Effort: Pulmonary effort is normal. No respiratory distress. Breath sounds: Normal breath sounds. No wheezing or rales. Abdominal:      General: There is no distension. Palpations: Abdomen is soft. Tenderness: There is no abdominal tenderness. Skin:     General: Skin is warm and dry. Coloration: Skin is not pale. Findings: No erythema. Neurological:      Mental Status: She is alert and oriented to person, place, and time. Labs Reviewed 2021:    No results found for: WBC, HGB, HCT, PLT, CHOL, TRIG, HDL, LDLDIRECT, ALT, AST, NA, K, CL, CREATININE, BUN, CO2, TSH, PSA, INR, GLUF, LABA1C, LABMICR    Assessment/Plan      1. Severe opioid use disorder (HCC)  UDS bup, amp  Reports she took Adderall from an old prescription yesterday. Advised against this, as these are probably . Advised to dispose. OARRS appropriate to treatment, no discrepancy identified  Cravings/urges controlled, no withdrawal symptoms. On Suboxone 8 mg twice daily, was on 4 mg twice daily for the last 24 hours due to opiate positive urine. Reported she used codeine cough syrup. Advised against any opiate use of any kind due to the risk of precipitated withdrawal or overdose. Verbalizes understanding. Resume previous dosing  Encouraged to pursue counseling arrangements as advised at this time. Follow up with Dr. Lai Garcia in 7 days.   - POCT Rapid Drug Screen  - buprenorphine-naloxone (SUBOXONE) 8-2 MG FILM SL film; Place 1 Film under the tongue 2 times daily for 7 days. Dispense: 14 Film; Refill: 0    2. Encounter for monitoring Suboxone maintenance therapy    Return in about 1 week (around 4/15/2021) for OBOT. Patient given educational materials - see patient instructions. Discussed use, benefit, and side effects of prescribed medications. All patient questions answered. Pt voiced understanding. Electronically signed JAQUELINE Carney CNP on 4/8/21 at 4:31 PM EDT

## 2021-04-08 NOTE — PROGRESS NOTES
Per vo from [facility] CNP. Urine drug screen was ordered and resulted. It is pos for AMP and BUP. [facility] CNP verified results.

## 2021-04-16 ASSESSMENT — ENCOUNTER SYMPTOMS
RHINORRHEA: 1
DIARRHEA: 0
COUGH: 0
VOMITING: 0
ABDOMINAL PAIN: 0
NAUSEA: 0
SHORTNESS OF BREATH: 0
SORE THROAT: 1

## 2021-04-19 ASSESSMENT — ENCOUNTER SYMPTOMS
VOMITING: 0
COUGH: 0
SHORTNESS OF BREATH: 0
ABDOMINAL PAIN: 0
NAUSEA: 0
DIARRHEA: 0

## 2021-04-21 ENCOUNTER — OFFICE VISIT (OUTPATIENT)
Dept: INTERNAL MEDICINE CLINIC | Age: 31
End: 2021-04-21
Payer: COMMERCIAL

## 2021-04-21 VITALS
HEIGHT: 67 IN | DIASTOLIC BLOOD PRESSURE: 74 MMHG | SYSTOLIC BLOOD PRESSURE: 125 MMHG | BODY MASS INDEX: 20.4 KG/M2 | TEMPERATURE: 97.2 F | HEART RATE: 93 BPM | WEIGHT: 130 LBS

## 2021-04-21 DIAGNOSIS — F11.20 SEVERE OPIOID USE DISORDER (HCC): Primary | ICD-10-CM

## 2021-04-21 PROCEDURE — G8427 DOCREV CUR MEDS BY ELIG CLIN: HCPCS | Performed by: INTERNAL MEDICINE

## 2021-04-21 PROCEDURE — 80305 DRUG TEST PRSMV DIR OPT OBS: CPT | Performed by: INTERNAL MEDICINE

## 2021-04-21 PROCEDURE — 99213 OFFICE O/P EST LOW 20 MIN: CPT | Performed by: INTERNAL MEDICINE

## 2021-04-21 PROCEDURE — 4004F PT TOBACCO SCREEN RCVD TLK: CPT | Performed by: INTERNAL MEDICINE

## 2021-04-21 PROCEDURE — G8420 CALC BMI NORM PARAMETERS: HCPCS | Performed by: INTERNAL MEDICINE

## 2021-04-21 RX ORDER — BUPRENORPHINE AND NALOXONE 8; 2 MG/1; MG/1
1 FILM, SOLUBLE BUCCAL; SUBLINGUAL 2 TIMES DAILY
COMMUNITY
End: 2021-04-21 | Stop reason: SDUPTHER

## 2021-04-21 RX ORDER — BUPRENORPHINE AND NALOXONE 8; 2 MG/1; MG/1
FILM, SOLUBLE BUCCAL; SUBLINGUAL
Qty: 11 FILM | Refills: 0 | Status: SHIPPED | OUTPATIENT
Start: 2021-04-21 | End: 2021-05-03 | Stop reason: SDUPTHER

## 2021-04-21 NOTE — PROGRESS NOTES
Per VO Dr. Diana Bryant urine drug obtained and resulted. Pos for BUP and AMP. Results verified with Dr. Diana Bryant.

## 2021-04-21 NOTE — PROGRESS NOTES
2021     Yeni Snyder (:  1990) is a 26 y.o. female, here for evaluation of the following medical concerns: MAT      Chronic opiod  Use disorder / suboxone follow up          Last seen in the office on ~ 2 weeks ago by Nitish Vizcaino CNP here for appt, Beba Celaya reschedules her appointment on my chart. She  Was a script for 8 mg suboxone film # 14 . And she could not tolerate the sublocade shots in the past, so we had to switch her back to suboxone films. She claims she got a flat tire, and using her donut. .  Last intake of suboxone film  , about 3 days ago. She took her old dose of adderrall one tab, today. Old script, and plans on seeing a psychiatrist in mid . Denies any recent  street drug intake, 2019    Reviewed patient drug screen with the pt today ,  [place] for BP and amp. Last merridian urine studies x 2 noted. Patient completed her counselng as she has been in various programs over last several years, recently at [facility] START OF CARE 2020    OARRS report reviewed and d/w pt today , risk score of 660      Review of Systems     Refer to HPI    COWS  2        Prior to Visit Medications    Medication Sig Taking? Authorizing Provider   buprenorphine-naloxone (SUBOXONE) 8-2 MG FILM SL film Place 1 Film under the tongue 2 times daily.  Yes Historical Provider, MD   fluticasone (FLONASE) 50 MCG/ACT nasal spray 1 spray by Each Nostril route daily Yes JAQUELINE Traore - CNP   cloNIDine (CATAPRES) 0.1 MG tablet Take 1 tablet by mouth 2 times daily as needed for High Blood Pressure Yes Renita Ramesh MD        Social History     Tobacco Use    Smoking status: Current Every Day Smoker     Packs/day: 0.25     Years: 1.00     Pack years: 0.25     Types: Cigarettes    Smokeless tobacco: Never Used   Substance Use Topics    Alcohol use: Not Currently        Vitals:    21 1541   BP: 125/74   Site: Right Upper Arm   Pulse: 93   Temp: 97.2 °F (36.2 °C)   Weight: 130 lb (59 kg)   Height: 5' 7.01\" (1.702 m)     Estimated body mass index is 20.35 kg/m² as calculated from the following:    Height as of this encounter: 5' 7.01\" (1.702 m). Weight as of this encounter: 130 lb (59 kg). Physical Exam     Pupils normal size and reacting to light,  She is alert and oriented x 3, abd soft NT, BS, Lungs CTA,          ASSESSMENT/PLAN:     Diagnosis Orders   1. Severe opioid use disorder (HCC)  POCT Rapid Drug Screen     Pt could not tolerate the sublocade shot , in the past .  Cut down   Suboxone film 8 mg am and 1/2 pm well without much difficulty. She  Takes clonidine as needed. 2. Hx of ? ADD - referred  to psychiatry here in town. Was seen by a pscyhiatrist in [facility]  after her  . She uses adderall on  a prn basis, before being on  percocet. She will be seeing dr. Jj Rowell in  , on a cancellation list.         GOAL:  To enhance patient recovery through the use of medication assisted treatment to improve overall quality of life. RTO in 7 days  and again encouraged to drink plenty of fluids mainly water before each office visit as she needs to give a urine sample. Refilled suboxone 8 mg film am and 1/2 PM # 11  , for 7 days      An electronic signature was used to authenticate this note.     --Pa Childress MD on 2021 at 3:53 PM

## 2021-05-03 ENCOUNTER — OFFICE VISIT (OUTPATIENT)
Dept: INTERNAL MEDICINE CLINIC | Age: 31
End: 2021-05-03
Payer: COMMERCIAL

## 2021-05-03 VITALS
DIASTOLIC BLOOD PRESSURE: 86 MMHG | HEART RATE: 88 BPM | BODY MASS INDEX: 19.97 KG/M2 | HEIGHT: 67 IN | SYSTOLIC BLOOD PRESSURE: 139 MMHG | TEMPERATURE: 98.5 F | WEIGHT: 127.2 LBS

## 2021-05-03 DIAGNOSIS — F11.20 SEVERE OPIOID USE DISORDER (HCC): Primary | ICD-10-CM

## 2021-05-03 PROCEDURE — G8427 DOCREV CUR MEDS BY ELIG CLIN: HCPCS | Performed by: INTERNAL MEDICINE

## 2021-05-03 PROCEDURE — G8420 CALC BMI NORM PARAMETERS: HCPCS | Performed by: INTERNAL MEDICINE

## 2021-05-03 PROCEDURE — 4004F PT TOBACCO SCREEN RCVD TLK: CPT | Performed by: INTERNAL MEDICINE

## 2021-05-03 PROCEDURE — 80305 DRUG TEST PRSMV DIR OPT OBS: CPT | Performed by: INTERNAL MEDICINE

## 2021-05-03 PROCEDURE — 99213 OFFICE O/P EST LOW 20 MIN: CPT | Performed by: INTERNAL MEDICINE

## 2021-05-03 RX ORDER — BUPRENORPHINE AND NALOXONE 8; 2 MG/1; MG/1
1 FILM, SOLUBLE BUCCAL; SUBLINGUAL DAILY
Refills: 0 | Status: CANCELLED | OUTPATIENT
Start: 2021-05-03 | End: 2021-06-02

## 2021-05-03 RX ORDER — BUPRENORPHINE AND NALOXONE 8; 2 MG/1; MG/1
FILM, SOLUBLE BUCCAL; SUBLINGUAL
Qty: 11 FILM | Refills: 0 | Status: SHIPPED | OUTPATIENT
Start: 2021-05-03 | End: 2021-05-10

## 2021-05-03 NOTE — PROGRESS NOTES
Per VO Dr. Jamel Louie urine drug ordered and resulted. Pos for BUP and AMP. Results verified with Howard Kocher, LPN. Dr. Jamel Louie notified.
calculated from the following:    Height as of this encounter: 5' 7\" (1.702 m). Weight as of this encounter: 127 lb 3.2 oz (57.7 kg). Physical Exam     Pupils normal size and reacting to light,  She is alert and oriented x 3, abd soft NT, BS, Lungs CTA,          ASSESSMENT/PLAN:     Diagnosis Orders   1. Severe opioid use disorder (HCC)  POCT Rapid Drug Screen    buprenorphine-naloxone (SUBOXONE) 8-2 MG FILM SL film     Pt could not tolerate the sublocade shot , in the past .  Cut down   Suboxone film 8 mg am and 1/2 pm well without much difficulty. She  Takes clonidine as needed. 2. Hx of ? ADD - referred  to psychiatry here in [facility]. [facility]  . She uses adderall on  a prn basis, before being on  percocet. She will be seeing dr. Lashawn Guardado in  , on a cancellation list.         GOAL:  To enhance patient recovery through the use of medication assisted treatment to improve overall quality of life. RTO in 7 days  and again encouraged to drink plenty of fluids mainly water before each office visit as she needs to give a urine sample. Refilled suboxone 8 mg film am and 1/2 PM # 11  , for 7 days      An electronic signature was used to authenticate this note.     --Tenisha Alvarado MD on 5/3/2021 at 11:15 AM

## 2021-05-11 ENCOUNTER — OFFICE VISIT (OUTPATIENT)
Dept: INTERNAL MEDICINE CLINIC | Age: 31
End: 2021-05-11
Payer: COMMERCIAL

## 2021-05-11 VITALS
BODY MASS INDEX: 19.9 KG/M2 | DIASTOLIC BLOOD PRESSURE: 82 MMHG | TEMPERATURE: 98.2 F | HEART RATE: 82 BPM | WEIGHT: 126.8 LBS | SYSTOLIC BLOOD PRESSURE: 128 MMHG | HEIGHT: 67 IN

## 2021-05-11 DIAGNOSIS — F11.20 SEVERE OPIOID USE DISORDER (HCC): Primary | ICD-10-CM

## 2021-05-11 PROCEDURE — G8420 CALC BMI NORM PARAMETERS: HCPCS | Performed by: INTERNAL MEDICINE

## 2021-05-11 PROCEDURE — G8427 DOCREV CUR MEDS BY ELIG CLIN: HCPCS | Performed by: INTERNAL MEDICINE

## 2021-05-11 PROCEDURE — 4004F PT TOBACCO SCREEN RCVD TLK: CPT | Performed by: INTERNAL MEDICINE

## 2021-05-11 PROCEDURE — 99213 OFFICE O/P EST LOW 20 MIN: CPT | Performed by: INTERNAL MEDICINE

## 2021-05-11 PROCEDURE — 80305 DRUG TEST PRSMV DIR OPT OBS: CPT | Performed by: INTERNAL MEDICINE

## 2021-05-11 RX ORDER — BUPRENORPHINE AND NALOXONE 8; 2 MG/1; MG/1
1 FILM, SOLUBLE BUCCAL; SUBLINGUAL DAILY
COMMUNITY
End: 2021-05-11 | Stop reason: SDUPTHER

## 2021-05-11 RX ORDER — BUPRENORPHINE AND NALOXONE 8; 2 MG/1; MG/1
1 FILM, SOLUBLE BUCCAL; SUBLINGUAL DAILY
Qty: 11 FILM | Refills: 0 | Status: SHIPPED | OUTPATIENT
Start: 2021-05-11 | End: 2021-05-18

## 2021-05-24 ENCOUNTER — OFFICE VISIT (OUTPATIENT)
Dept: INTERNAL MEDICINE CLINIC | Age: 31
End: 2021-05-24
Payer: COMMERCIAL

## 2021-05-24 VITALS
WEIGHT: 124.4 LBS | SYSTOLIC BLOOD PRESSURE: 131 MMHG | TEMPERATURE: 97.8 F | HEIGHT: 67 IN | BODY MASS INDEX: 19.53 KG/M2 | DIASTOLIC BLOOD PRESSURE: 83 MMHG | HEART RATE: 118 BPM

## 2021-05-24 DIAGNOSIS — F11.20 SEVERE OPIOID USE DISORDER (HCC): Primary | ICD-10-CM

## 2021-05-24 PROCEDURE — 4004F PT TOBACCO SCREEN RCVD TLK: CPT | Performed by: INTERNAL MEDICINE

## 2021-05-24 PROCEDURE — 99213 OFFICE O/P EST LOW 20 MIN: CPT | Performed by: INTERNAL MEDICINE

## 2021-05-24 PROCEDURE — 80305 DRUG TEST PRSMV DIR OPT OBS: CPT | Performed by: INTERNAL MEDICINE

## 2021-05-24 PROCEDURE — G8420 CALC BMI NORM PARAMETERS: HCPCS | Performed by: INTERNAL MEDICINE

## 2021-05-24 PROCEDURE — G8427 DOCREV CUR MEDS BY ELIG CLIN: HCPCS | Performed by: INTERNAL MEDICINE

## 2021-05-24 RX ORDER — BUPRENORPHINE AND NALOXONE 8; 2 MG/1; MG/1
FILM, SOLUBLE BUCCAL; SUBLINGUAL
Qty: 12 FILM | Refills: 0 | Status: SHIPPED | OUTPATIENT
Start: 2021-05-24 | End: 2021-06-01 | Stop reason: SDUPTHER

## 2021-05-24 RX ORDER — BUPRENORPHINE AND NALOXONE 8; 2 MG/1; MG/1
1 FILM, SOLUBLE BUCCAL; SUBLINGUAL DAILY
COMMUNITY
End: 2021-05-24 | Stop reason: SDUPTHER

## 2021-05-24 SDOH — ECONOMIC STABILITY: FOOD INSECURITY: WITHIN THE PAST 12 MONTHS, THE FOOD YOU BOUGHT JUST DIDN'T LAST AND YOU DIDN'T HAVE MONEY TO GET MORE.: NEVER TRUE

## 2021-05-24 SDOH — ECONOMIC STABILITY: TRANSPORTATION INSECURITY
IN THE PAST 12 MONTHS, HAS THE LACK OF TRANSPORTATION KEPT YOU FROM MEDICAL APPOINTMENTS OR FROM GETTING MEDICATIONS?: NO

## 2021-05-24 SDOH — ECONOMIC STABILITY: FOOD INSECURITY: WITHIN THE PAST 12 MONTHS, YOU WORRIED THAT YOUR FOOD WOULD RUN OUT BEFORE YOU GOT MONEY TO BUY MORE.: NEVER TRUE

## 2021-05-24 ASSESSMENT — SOCIAL DETERMINANTS OF HEALTH (SDOH): HOW HARD IS IT FOR YOU TO PAY FOR THE VERY BASICS LIKE FOOD, HOUSING, MEDICAL CARE, AND HEATING?: SOMEWHAT HARD

## 2021-05-24 NOTE — PROGRESS NOTES
Per VO Dr. Adam Colon urine drug ordered and obtained. Pos for BU         2021     Jania Parker (:  1990) is a 33 y.o. female, here for evaluation of the following medical concerns: MAT      Chronic opiod  Use disorder / suboxone follow up       Last seen in the office on 5/3/2021 by this observer, on new lower dose of suboxone . Doing well on the new doseClaims took one tab on . 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 Her father is in [facility], pt is eating ok. I recommend she considers the covid vaccine. Patient unfortunately changes her appointments, She was only given for 7 days but now seen 14 day later, as patient rescheduled . , no recent fever or chills, no major hospitalizations. Denies any recent  street drug intake, 2019    Reviewed patient drug screen with the pt today ,  [place]ivie for BP and amp. Last merridian urine studies x 2 noted. Mild sweating at night, and claims 1/2 dose of clonidine at night, but takes it at midnight       Patient completed her counseling at [place], as she was at various places for suboxone therapy. formerly [facility] START OF CARE 2020 risk score of 600    OARRS report reviewed , today    Review of Systems     Refer to HPI            Prior to Visit Medications    Medication Sig Taking? Authorizing Provider   buprenorphine-naloxone (SUBOXONE) 8-2 MG FILM SL film Place 1 Film under the tongue daily.  1 film in the morning 0.5 film in the evening Yes Historical Provider, MD   fluticasone (FLONASE) 50 MCG/ACT nasal spray 1 spray by Each Nostril route daily Yes Alida Arceo APRN - CNP   cloNIDine (CATAPRES) 0.1 MG tablet Take 1 tablet by mouth 2 times daily as needed for High Blood Pressure Yes Iris Bourgeois MD        Social History     Tobacco Use    Smoking status: Current Every Day Smoker     Packs/day: 0.25     Years: 1.00     Pack years: 0.25     Types: Cigarettes    Smokeless tobacco: Never Used   Substance Use Topics    Alcohol use: Not Currently        Vitals:    21 1449   BP: 131/83   Site: Left Upper Arm   Pulse: 118   Temp: 97.8 °F (36.6 °C)   Weight: 124 lb 6.4 oz (56.4 kg)   Height: 5' 7\" (1.702 m)     Estimated body mass index is 19.48 kg/m² as calculated from the following:    Height as of this encounter: 5' 7\" (1.702 m). Weight as of this encounter: 124 lb 6.4 oz (56.4 kg). Physical Exam     Pupils normal size and reacting to light,  She is alert and oriented x 3, abd soft NT, BS, Lungs CTA,          ASSESSMENT/PLAN:     Diagnosis Orders   1. Severe opioid use disorder (HCC)  POCT Rapid Drug Screen     Pt could not tolerate the sublocade shot , in the past .  Cut down   Suboxone film 8 mg am and 1/2 pm well without much difficulty. She  Takes clonidine as needed, encouraged her to take it at 10 pm which will be better. Today I spoke with her regarding cutting down the suboxone to 8 mg 1/2 BID, but she is apprehensive re this option, so continue one film in am and 1/2 pm.     2. Hx of ? ADD - referred  to psychiatry here in [facility]. [facility] her  . She uses adderall on  a prn basis, before being on  percocet. She will be seeing dr. Dino Koenig in         GOAL:  To enhance patient recovery through the use of medication assisted treatment to improve overall quality of life. RTO in 8 days  and again encouraged to drink plenty of fluids mainly water before each office visit as she needs to give a urine sample. Refilled suboxone 8 mg film am and 1/2 PM # 12  , for 8 days  E scribed      An electronic signature was used to authenticate this note. --Alexis So MD on 2021 at 3:28 PM      P. Results verified with Sinan Barrera LPN. Dr. Ivett Dwyer notified.

## 2021-06-01 ENCOUNTER — OFFICE VISIT (OUTPATIENT)
Dept: INTERNAL MEDICINE CLINIC | Age: 31
End: 2021-06-01
Payer: COMMERCIAL

## 2021-06-01 VITALS
HEIGHT: 67 IN | SYSTOLIC BLOOD PRESSURE: 111 MMHG | DIASTOLIC BLOOD PRESSURE: 57 MMHG | HEART RATE: 72 BPM | TEMPERATURE: 99 F | BODY MASS INDEX: 19.87 KG/M2 | WEIGHT: 126.6 LBS

## 2021-06-01 DIAGNOSIS — F11.20 SEVERE OPIOID USE DISORDER (HCC): Primary | ICD-10-CM

## 2021-06-01 DIAGNOSIS — F17.200 TOBACCO USE DISORDER: ICD-10-CM

## 2021-06-01 PROCEDURE — G8420 CALC BMI NORM PARAMETERS: HCPCS | Performed by: INTERNAL MEDICINE

## 2021-06-01 PROCEDURE — 80305 DRUG TEST PRSMV DIR OPT OBS: CPT | Performed by: INTERNAL MEDICINE

## 2021-06-01 PROCEDURE — G8427 DOCREV CUR MEDS BY ELIG CLIN: HCPCS | Performed by: INTERNAL MEDICINE

## 2021-06-01 PROCEDURE — 4004F PT TOBACCO SCREEN RCVD TLK: CPT | Performed by: INTERNAL MEDICINE

## 2021-06-01 PROCEDURE — 99213 OFFICE O/P EST LOW 20 MIN: CPT | Performed by: INTERNAL MEDICINE

## 2021-06-01 RX ORDER — BUPRENORPHINE AND NALOXONE 8; 2 MG/1; MG/1
FILM, SOLUBLE BUCCAL; SUBLINGUAL
Qty: 11 FILM | Refills: 0 | Status: SHIPPED | OUTPATIENT
Start: 2021-06-01 | End: 2021-06-14 | Stop reason: SDUPTHER

## 2021-06-01 NOTE — PROGRESS NOTES
Per VO Dr. Eric Jaeger urine drug ordered and obtained. Pos for BU         2021     Preston Carr (:  1990) is a 33 y.o. female, here for evaluation of the following medical concerns: MAT      Chronic opiod  Use disorder / suboxone follow up       Last seen in the office on 2021 by this observer, on new lower dose of suboxone . Doing well on the new dose . 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 I recommend she considers the covid vaccine. Patient unfortunately changes her appointments, she is here today for follow up. Denies any recent  street drug intake, last time was  2019  No recent hospitalizations. Reviewed patient drug screen with the pt today ,  Postivie for BP   And denies any relapses. Patient completed her counseling at [place], as she was at various places for suboxone therapy. [facility] START OF CARE 2020 risk score of 600    OARRS report reviewed , today    Review of Systems     Refer to HPI            Prior to Visit Medications    Medication Sig Taking? Authorizing Provider   buprenorphine-naloxone (SUBOXONE) 8-2 MG FILM SL film Place 1 Film under the tongue daily (with breakfast) AND 0.5 Film every evening. Do all this for 7 days.  Yes Yared Han MD   fluticasone (FLONASE) 50 MCG/ACT nasal spray 1 spray by Each Nostril route daily  JAQUELINE Barksdale CNP   cloNIDine (CATAPRES) 0.1 MG tablet Take 1 tablet by mouth 2 times daily as needed for High Blood Pressure  Yared Han MD        Social History     Tobacco Use    Smoking status: Current Every Day Smoker     Packs/day: 0.25     Years: 1.00     Pack years: 0.25     Types: Cigarettes    Smokeless tobacco: Never Used   Substance Use Topics    Alcohol use: Not Currently        Vitals:    21 1541   BP: (!) 111/57   Site: Right Upper Arm   Pulse: 72   Temp: 99 °F (37.2 °C)   Weight: 126 lb 9.6 oz (57.4 kg)   Height: 5' 7\" (1.702 m)     Estimated body mass index is 19.83 kg/m² as calculated from the following:    Height as of this encounter: 5' 7\" (1.702 m). Weight as of this encounter: 126 lb 9.6 oz (57.4 kg). Physical Exam     Pupils normal size and reacting to light,  She is alert and oriented x 3, abd soft NT, BS, Lungs CTA,          ASSESSMENT/PLAN:     Diagnosis Orders   1. Severe opioid use disorder (HCC)  POCT Rapid Drug Screen    buprenorphine-naloxone (SUBOXONE) 8-2 MG FILM SL film   2. Tobacco use disorder       Pt could not tolerate the sublocade shot , in the past .  Cut down   Suboxone film 8 mg am and 1/2 pm well without much difficulty. She  Takes clonidine as needed, encouraged her to take it at 10 pm which will be better. I spoke with her regarding cutting down the suboxone to 8 mg 1/2 BID, but she is apprehensive re this option, so continue one film in am and 1/2 pm. Pt has been encouraged to stay clean, and lead a healthy life style. 2. Hx of ? ADD - referred  to psychiatry here in [facility]. Was seen by a pscyhiatrist in [facility]  after her  . She uses adderall on  a prn basis, before being on  percocet. She will be seeing dr. Bradley Tinajero soon. GOAL:  To enhance patient recovery through the use of medication assisted treatment to improve overall quality of life. RTO in 7 days  and again encouraged to drink plenty of fluids mainly water before each office visit as she needs to give a urine sample. Refilled suboxone 8 mg film am and 1/2 PM # 11 , for 7  days  E scribed      An electronic signature was used to authenticate this note. --Tello Pulido MD on 2021 at 4:49 PM      P. Results verified with Walker Santiago LPN. Dr. Rose Huertas notified.

## 2021-06-14 ENCOUNTER — HOSPITAL ENCOUNTER (OUTPATIENT)
Age: 31
Discharge: HOME | End: 2021-06-14
Payer: COMMERCIAL

## 2021-06-14 ENCOUNTER — HOSPITAL ENCOUNTER (OUTPATIENT)
Dept: GENERAL RADIOLOGY | Age: 31
Discharge: HOME | End: 2021-06-14
Payer: COMMERCIAL

## 2021-06-14 DIAGNOSIS — J06.9 ACUTE UPPER RESPIRATORY INFECTION: Primary | ICD-10-CM

## 2021-06-14 DIAGNOSIS — J06.9 ACUTE UPPER RESPIRATORY INFECTION: ICD-10-CM

## 2021-06-14 DIAGNOSIS — F11.20 SEVERE OPIOID USE DISORDER (HCC): ICD-10-CM

## 2021-06-14 LAB
INFLUENZA A: NOT DETECTED
INFLUENZA B: NOT DETECTED
SARS-COV-2 RNA, RT PCR: NOT DETECTED

## 2021-06-14 PROCEDURE — 87636 SARSCOV2 & INF A&B AMP PRB: CPT

## 2021-06-14 PROCEDURE — 71046 X-RAY EXAM CHEST 2 VIEWS: CPT

## 2021-06-14 RX ORDER — BUPRENORPHINE AND NALOXONE 8; 2 MG/1; MG/1
FILM, SOLUBLE BUCCAL; SUBLINGUAL
Qty: 2 FILM | Refills: 0 | Status: SHIPPED | OUTPATIENT
Start: 2021-06-14 | End: 2021-06-16

## 2021-06-15 ENCOUNTER — TELEPHONE (OUTPATIENT)
Dept: INTERNAL MEDICINE CLINIC | Age: 31
End: 2021-06-15

## 2021-06-15 ENCOUNTER — VIRTUAL VISIT (OUTPATIENT)
Dept: PSYCHIATRY | Age: 31
End: 2021-06-15
Payer: COMMERCIAL

## 2021-06-15 DIAGNOSIS — R41.840 ATTENTION DEFICIT: Primary | ICD-10-CM

## 2021-06-15 DIAGNOSIS — F41.9 ANXIETY: ICD-10-CM

## 2021-06-15 PROCEDURE — 90792 PSYCH DIAG EVAL W/MED SRVCS: CPT | Performed by: PSYCHIATRY & NEUROLOGY

## 2021-06-15 RX ORDER — BUPROPION HYDROCHLORIDE 150 MG/1
150 TABLET ORAL EVERY MORNING
Qty: 30 TABLET | Refills: 2 | Status: SHIPPED | OUTPATIENT
Start: 2021-06-15 | End: 2021-08-18

## 2021-06-15 NOTE — PROGRESS NOTES
[address] [facility] [address]  [phone]    New Patient Progress Note    Patient:  Nati Mejía  YOB: 1990  PCP:  Leti Reynolds MD    Visit Date:  6/15/2021     TELEHEALTH EVALUATION -- Audio/Visual (During TSZMQ-81 public health emergency)    Patient location: home  Physician location: [facility] [place], [address]  This virtual visit was conducted via interactive, real-time video. Nati Mejía is a 33 y.o. female who is presenting today as a new patient at [address] [place] [address]. Chief Complaint   Patient presents with    [facility]    Anxiety    ADHD       HPI:   She presents alone. Voices concerns for ADHD. Issues with anxiety. Grieved sudden death of her  in . Feels she has been doing better there. Changed her whole life. Describes the perfect life before he .  with a 3 yo living in [place] with financial security. Worsened stress after the loss. Moved closer to her parents. Her father has health issues and helps care for him noting how his baseline has worsened. He's in acute rehab and not walking yet with plan to come home eventually. Notes her mom is 77 and her sister lives in [place] and doesn't help. Voices resentment that her sister isn't helping. Takes Suboxone for 2 years. Had ankle surgery 10 years ago complicated by MRSA. Became physiologically dependent on opiates and found herself having urges to misuse the medication. She denies any addict behavior otherwise and was changed to Suboxone with good pain relief. Was a runner in college. Notes ankle issue progressed to knee issue. Looking into a surgery. Can't take 2 mos on bedrest to recover with her current responsibilities. When she gets up in AM has a lot to get done. Has trouble focusing. Describes how stress worsens focus. Has had to modify by writing things down. \"Because there was so much on me. \"   Sister in [place] and can't help out. Mom is 77 \"so it's all on me\". Has trouble forgetting things, not making appointments. Frequently changing her mind about things. Had her dtr in online schooling. Went well. \"Never had a moment to get things together or organize. \"  Making lists of what needs to get done. Had trouble retaining information in college. Would have to read things repeatedly. Had to cram for tests \"mind would go blank\". Was started on Paxil around age 22 when in college for anxiety. Mood: has had periods of low mood that generally are not prolonged. Denies any past episodes of depression. Felt depressed after death of her  which she felt was a normal grief reaction. Denies trouble with mood swings. Sleep: denies trouble  Anhedonia: no  Hopelessness/guilt: no  Fatigue: energy is low in morning but ok through the day  Concentration: endorses trouble, see above  Appetite: denies trouble  SI? Denies any suicidal thoughts  SA/self injury hx: denies any past SA or SIB  Dalia/hypomania: no    Anxiety: endorses anxiety, typically worries about whatever stressors are going on. At times feels more accepting. Worries about being on time. Worries about what others think. Easily overwhelmed as noted, above. .     Anger/Violence: no  HI? no    Trauma: denies any h/o abuse   Losing her  was biggest trauma from the past. No nightmares or flashbacks. Psychosis: denies any h/o AVH, psychotic symptoms, or paranoia    Substance use:  Denies any h/o substance abuse.        Past Psychiatric History:  Inpatient: denies  Outpatient: most recently saw Dr. Lisa Corona at [facility] in [facility] trials/adverse reactions: Paxil (disliked, \"zombie\", nausea)      Past Medical History:  H/o seizure: denies  H/o TBI: denies    No Known Allergies    Past Medical History:   Diagnosis Date    MRSA (methicillin resistant staph aureus) culture positive        Past Surgical History:   Procedure Laterality Date    ANKLE SURGERY           Current Outpatient Medications:     cloNIDine (CATAPRES) 0.1 MG tablet, Take 1 tablet by mouth 2 times daily as needed for High Blood Pressure, Disp: 60 tablet, Rfl: 3    buprenorphine-naloxone (SUBOXONE) 8-2 MG FILM SL film, Place 1.5 Film under the tongue daily. 1 film in the AM and 0.5 in the PM, Disp: , Rfl:     buprenorphine-naloxone (SUBOXONE) 8-2 MG FILM SL film, Place 1 Film under the tongue every morning AND 0.5 Film every evening. Do all this for 7 days. 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 , Possible medication side effects, risk of tolerance/dependence & alternative treatments discussed. Analy Gil MD)    There were no vitals taken for this visit.      MENTAL STATUS EXAM:    GENERAL  Build: Normal    Hygiene:  Appropriate, well groomed   SENSORIUM Orientation: Place, Person, Time, & Situation     Consciousness: Alert    ATTENTION   Focused, mostly   RELATEDNESS  Cooperative and Controlling, talking most of the session   EYE CONTACT   Good    PSYCHOMOTOR  Normal    SPEECH

## 2021-06-15 NOTE — TELEPHONE ENCOUNTER
I called pt and left a message that her tests were negative and needs to schedule appt as only has 2 day supply given yesterday.

## 2021-06-15 NOTE — TELEPHONE ENCOUNTER
----- Message from Sommer Griffin MD sent at 6/15/2021  8:41 AM EDT -----  Inform her all the tests are negative, including covid  And we need to see her this week for OBOT as she got only two day supply  Yesterday    Electronically signed by Sommer Griffin MD on 6/15/2021 at 8:41 AM

## 2021-06-17 ENCOUNTER — OFFICE VISIT (OUTPATIENT)
Dept: INTERNAL MEDICINE CLINIC | Age: 31
End: 2021-06-17
Payer: COMMERCIAL

## 2021-06-17 VITALS
SYSTOLIC BLOOD PRESSURE: 125 MMHG | BODY MASS INDEX: 19.62 KG/M2 | HEART RATE: 81 BPM | HEIGHT: 67 IN | TEMPERATURE: 97.6 F | WEIGHT: 125 LBS | DIASTOLIC BLOOD PRESSURE: 74 MMHG

## 2021-06-17 DIAGNOSIS — F11.20 SEVERE OPIOID USE DISORDER (HCC): Primary | ICD-10-CM

## 2021-06-17 PROCEDURE — G8420 CALC BMI NORM PARAMETERS: HCPCS | Performed by: INTERNAL MEDICINE

## 2021-06-17 PROCEDURE — 4004F PT TOBACCO SCREEN RCVD TLK: CPT | Performed by: INTERNAL MEDICINE

## 2021-06-17 PROCEDURE — 80305 DRUG TEST PRSMV DIR OPT OBS: CPT | Performed by: INTERNAL MEDICINE

## 2021-06-17 PROCEDURE — 99213 OFFICE O/P EST LOW 20 MIN: CPT | Performed by: INTERNAL MEDICINE

## 2021-06-17 PROCEDURE — G8427 DOCREV CUR MEDS BY ELIG CLIN: HCPCS | Performed by: INTERNAL MEDICINE

## 2021-06-17 RX ORDER — BUPRENORPHINE AND NALOXONE 8; 2 MG/1; MG/1
1.5 FILM, SOLUBLE BUCCAL; SUBLINGUAL DAILY
Qty: 10.5 FILM | Refills: 0 | Status: CANCELLED | OUTPATIENT
Start: 2021-06-17 | End: 2021-06-24

## 2021-06-17 RX ORDER — BUPRENORPHINE AND NALOXONE 8; 2 MG/1; MG/1
FILM, SOLUBLE BUCCAL; SUBLINGUAL
Qty: 11 FILM | Refills: 0 | Status: SHIPPED | OUTPATIENT
Start: 2021-06-17 | End: 2021-06-24

## 2021-06-17 RX ORDER — BUPRENORPHINE AND NALOXONE 8; 2 MG/1; MG/1
1.5 FILM, SOLUBLE BUCCAL; SUBLINGUAL DAILY
COMMUNITY
End: 2021-06-28 | Stop reason: SDUPTHER

## 2021-06-17 NOTE — PROGRESS NOTES
nasal spray 1 spray by Each Nostril route daily Yes Sheila Pringle, APRN - CNP   cloNIDine (CATAPRES) 0.1 MG tablet Take 1 tablet by mouth 2 times daily as needed for High Blood Pressure Yes Yared Han MD        Social History     Tobacco Use    Smoking status: Current Every Day Smoker     Packs/day: 0.25     Years: 1.00     Pack years: 0.25     Types: Cigarettes    Smokeless tobacco: Never Used   Substance Use Topics    Alcohol use: Not Currently        Vitals:    21 1552   BP: 125/74   Site: Right Upper Arm   Pulse: 81   Temp: 97.6 °F (36.4 °C)   Weight: 125 lb (56.7 kg)   Height: 5' 7\" (1.702 m)     Estimated body mass index is 19.58 kg/m² as calculated from the following:    Height as of this encounter: 5' 7\" (1.702 m). Weight as of this encounter: 125 lb (56.7 kg). Physical Exam     Pupils normal size and reacting to light,  She is alert and oriented x 3, abd soft NT, BS, Lungs CTA,          ASSESSMENT/PLAN:     Diagnosis Orders   1. Severe opioid use disorder (HCC)  POCT Rapid Drug Screen    buprenorphine-naloxone (SUBOXONE) 8-2 MG FILM SL film     Pt could not tolerate the sublocade shot , in the past . I spoke with her regarding suboxone , the plan is to cut that down and stop down the road. To continue with this at this time 8 mg Suboxone in the morning and then half film in the evening, for another week and then cut it down to half film twice daily. He has missed several points in the past and extended visits and she has done well with the lower dose, had a long discussion with Jenni Navarrete  about this today    2. Hx of ? ADD -recently seen on virtual visit by Dr. Jj Rowell. [facility]  [facility] her  . She uses adderall on  a prn basis, before being on  Percocet. Psychiatry team is considering ordering various testing, for possible ADD.   Now on Wellbutrin    GOAL:  To enhance patient recovery through the use of medication assisted treatment to improve overall quality of life. RTO in 7 days  and again encouraged to drink plenty of fluids mainly water before each office visit as she needs to give a urine sample. Refilled suboxone 8 mg film am and 1/2 PM # 11 , for 7  days  E scribed      An electronic signature was used to authenticate this note. --Valentine Zimmer MD on 6/17/2021 at 6:15 PM      P. Results verified with Howard Kocher, LPN. Dr. Jamel Louie notified.

## 2021-06-25 ENCOUNTER — TELEPHONE (OUTPATIENT)
Dept: INTERNAL MEDICINE CLINIC | Age: 31
End: 2021-06-25

## 2021-06-25 NOTE — TELEPHONE ENCOUNTER
Mess on shanae voicemail her appt time for Monday 6-28-21 has been changed from 11:45 am to 11:30 am

## 2021-06-28 ENCOUNTER — OFFICE VISIT (OUTPATIENT)
Dept: INTERNAL MEDICINE CLINIC | Age: 31
End: 2021-06-28
Payer: COMMERCIAL

## 2021-06-28 ENCOUNTER — TELEPHONE (OUTPATIENT)
Dept: PSYCHIATRY | Age: 31
End: 2021-06-28

## 2021-06-28 VITALS
HEIGHT: 67 IN | TEMPERATURE: 97.6 F | HEART RATE: 96 BPM | WEIGHT: 120 LBS | BODY MASS INDEX: 18.83 KG/M2 | DIASTOLIC BLOOD PRESSURE: 68 MMHG | SYSTOLIC BLOOD PRESSURE: 129 MMHG

## 2021-06-28 DIAGNOSIS — F11.20 SEVERE OPIOID USE DISORDER (HCC): Primary | ICD-10-CM

## 2021-06-28 PROCEDURE — 4004F PT TOBACCO SCREEN RCVD TLK: CPT | Performed by: INTERNAL MEDICINE

## 2021-06-28 PROCEDURE — G8420 CALC BMI NORM PARAMETERS: HCPCS | Performed by: INTERNAL MEDICINE

## 2021-06-28 PROCEDURE — G8427 DOCREV CUR MEDS BY ELIG CLIN: HCPCS | Performed by: INTERNAL MEDICINE

## 2021-06-28 PROCEDURE — 80305 DRUG TEST PRSMV DIR OPT OBS: CPT | Performed by: INTERNAL MEDICINE

## 2021-06-28 PROCEDURE — 99213 OFFICE O/P EST LOW 20 MIN: CPT | Performed by: INTERNAL MEDICINE

## 2021-06-28 RX ORDER — BUPRENORPHINE AND NALOXONE 8; 2 MG/1; MG/1
0.5 FILM, SOLUBLE BUCCAL; SUBLINGUAL 2 TIMES DAILY
Qty: 7 FILM | Refills: 0 | Status: SHIPPED | OUTPATIENT
Start: 2021-06-28 | End: 2021-07-05

## 2021-06-28 NOTE — PROGRESS NOTES
Per VO Dr. Milta Kocher urine drug ordered and resulted. Pos for AMP and BUP. Results verified with Dr. Milta Kocher.

## 2021-06-28 NOTE — PROGRESS NOTES
Per VO Dr. Ivett Dwyer urine drug ordered and obtained. Pos for BU         2021     Ha You (:  1990) is a 33 y.o. female, here for evaluation of the following medical concerns: MAT      Chronic opiod  Use disorder / suboxone follow up       Last seen in the office 11 days ago  by this observer, on new lower dose of suboxone . Lindsay Servin Last visit she had flu like symptoms , so we ordered covid testing and cxr both negative. ,  And she was given 2 days of Suboxone at that time . Influenza test was also negative. Now she feels better. No cp or SOB, eating well. Recently had a virtual visit with dr. Dino Koenig from Psych. . claims was in [place] recently. 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 Her father is in [facility], pt is eating ok. I recommend she considers the covid vaccine. Patient unfortunately changes her appointments, she is here today for follow up. Denies any recent  street drug intake, last time was  2019  No recent hospitalizations. Reviewed patient drug screen with the pt today ,  Postivie for BP   And denies any relapses. Patient completed her counseling at [facility], as she was at various places for suboxone therapy. [facility] START OF CARE 2020 risk score of 610    OARRS report reviewed , today    Review of Systems     Refer to HPI            Prior to Visit Medications    Medication Sig Taking? Authorizing Provider   buprenorphine-naloxone (SUBOXONE) 8-2 MG FILM SL film Place 0.5 Film under the tongue 2 times daily for 7 days.  Yes Alexis So MD   buPROPion (WELLBUTRIN XL) 150 MG extended release tablet Take 1 tablet by mouth every morning Yes Norva Hammans, MD   fluticasone (FLONASE) 50 MCG/ACT nasal spray 1 spray by Each Nostril route daily Yes Herb Ansari APRN - CNP   cloNIDine (CATAPRES) 0.1 MG tablet Take 1 tablet by mouth 2 times daily as needed for High Blood Pressure Yes Yared Han MD        Social History     Tobacco Use    Smoking status: Current Every Day Smoker     Packs/day: 0.25     Years: 1.00     Pack years: 0.25     Types: Cigarettes    Smokeless tobacco: Never Used   Substance Use Topics    Alcohol use: Not Currently        Vitals:    21 1203   BP: 129/68   Site: Right Upper Arm   Pulse: 96   Temp: 97.6 °F (36.4 °C)   Weight: 120 lb (54.4 kg)   Height: 5' 7\" (1.702 m)     Estimated body mass index is 18.79 kg/m² as calculated from the following:    Height as of this encounter: 5' 7\" (1.702 m). Weight as of this encounter: 120 lb (54.4 kg). Physical Exam     Pupils normal size and reacting to light,  She is alert and oriented x 3, abd soft NT, BS, Lungs CTA,          ASSESSMENT/PLAN:     Diagnosis Orders   1. Severe opioid use disorder (HCC)  POCT Rapid Drug Screen    buprenorphine-naloxone (SUBOXONE) 8-2 MG FILM SL film     Pt could not tolerate the sublocade shot , in the past . I spoke with her regarding and down the Suboxone to 8 mg half film twice daily as she is doing quite well, in the past were given her for 7 days but she is able to extend that out significantly. ChangesSuboxone 8 mg film half twice daily    2. Hx of ? ADD -recently seen on virtual visit by Dr. Alisa Schafer. was seen by a pscyhiatrist in [facility]  after her  . She uses adderall on  a prn basis, before being on  Percocet. Psychiatry team is considering ordering various testing, for possible ADD. Now on Wellbutrin    GOAL:  To enhance patient recovery through the use of medication assisted treatment to improve overall quality of life. RTO in 7 days  and again encouraged to drink plenty of fluids mainly water before each office visit as she needs to give a urine sample. Refilled suboxone 8 mg film am and 1/2 PM # 11 , for 7  days  E scribed, as its July 5th in our office being closed because of the holiday weekend. Give her another 1 day of Suboxone8 mg half twice daily and will see her on to back on 7/6/2021      An electronic signature was used to authenticate this note. --Sophy Dahl MD on 6/28/2021 at 1:14 PM      P. Results verified with Josh Wheeler LPN. Dr. Issac Bloom notified.

## 2021-06-28 NOTE — TELEPHONE ENCOUNTER
Yes we will need to wait and see if something opens up. I saw her about 2 weeks ago. I'd like to space out visits by at least 4 weeks if possible. We can try to get her in sometime between her initial visit in June and August when something opens up. We often don't know about openings until day of or day before. An appointment for her in July would likely be appropriate.    Electronically signed by Fatou Alvarez MD on 6/28/2021 at 11:52 AM

## 2021-07-01 DIAGNOSIS — F11.20 SEVERE OPIOID USE DISORDER (HCC): Primary | ICD-10-CM

## 2021-07-01 RX ORDER — BUPRENORPHINE AND NALOXONE 8; 2 MG/1; MG/1
0.5 FILM, SOLUBLE BUCCAL; SUBLINGUAL 2 TIMES DAILY
Qty: 1 FILM | Refills: 0 | Status: SHIPPED | OUTPATIENT
Start: 2021-07-03 | End: 2021-07-04

## 2021-07-06 ENCOUNTER — OFFICE VISIT (OUTPATIENT)
Dept: INTERNAL MEDICINE CLINIC | Age: 31
End: 2021-07-06
Payer: COMMERCIAL

## 2021-07-06 VITALS
BODY MASS INDEX: 19.21 KG/M2 | WEIGHT: 122.4 LBS | SYSTOLIC BLOOD PRESSURE: 133 MMHG | HEIGHT: 67 IN | TEMPERATURE: 97.6 F | DIASTOLIC BLOOD PRESSURE: 67 MMHG | HEART RATE: 82 BPM

## 2021-07-06 DIAGNOSIS — F11.20 SEVERE OPIOID USE DISORDER (HCC): Primary | ICD-10-CM

## 2021-07-06 PROCEDURE — 4004F PT TOBACCO SCREEN RCVD TLK: CPT | Performed by: INTERNAL MEDICINE

## 2021-07-06 PROCEDURE — G8427 DOCREV CUR MEDS BY ELIG CLIN: HCPCS | Performed by: INTERNAL MEDICINE

## 2021-07-06 PROCEDURE — 80305 DRUG TEST PRSMV DIR OPT OBS: CPT | Performed by: INTERNAL MEDICINE

## 2021-07-06 PROCEDURE — G8420 CALC BMI NORM PARAMETERS: HCPCS | Performed by: INTERNAL MEDICINE

## 2021-07-06 PROCEDURE — 99213 OFFICE O/P EST LOW 20 MIN: CPT | Performed by: INTERNAL MEDICINE

## 2021-07-06 RX ORDER — BUPRENORPHINE AND NALOXONE 8; 2 MG/1; MG/1
1 FILM, SOLUBLE BUCCAL; SUBLINGUAL DAILY
COMMUNITY
End: 2021-07-06 | Stop reason: SDUPTHER

## 2021-07-06 RX ORDER — BUPRENORPHINE AND NALOXONE 8; 2 MG/1; MG/1
0.5 FILM, SOLUBLE BUCCAL; SUBLINGUAL 2 TIMES DAILY
Qty: 10 FILM | Refills: 0 | Status: SHIPPED | OUTPATIENT
Start: 2021-07-06 | End: 2021-07-16 | Stop reason: SDUPTHER

## 2021-07-06 RX ORDER — BUPRENORPHINE AND NALOXONE 8; 2 MG/1; MG/1
1 FILM, SOLUBLE BUCCAL; SUBLINGUAL DAILY
Qty: 5 FILM | Refills: 0 | Status: SHIPPED | OUTPATIENT
Start: 2021-07-06 | End: 2021-07-06

## 2021-07-06 NOTE — PROGRESS NOTES
Per VO Dr. Jose Carlos Lawrence urine drug ordered and obtained. Pos for BU         2021     Leonor Dawkins (:  1990) is a 33 y.o. female, here for evaluation of the following medical concerns: MAT      Chronic opiod  Use disorder / suboxone follow up       Last seen in the office for MAT, here for follow up. 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 I recommend she considers the covid vaccine. Patient unfortunately changes her appointments, she is here today for follow up. Denies any recent  street drug intake, last time was  2019  No recent hospitalizations. Reviewed patient drug screen with the pt today          Patient completed her counseling at [facility] [place], as she was at various places for suboxone therapy. [facility] START OF CARE 2020 risk score of 610    OARRS report to be reviewed,  downloading problems    Review of Systems     Refer to HPI            Prior to Visit Medications    Medication Sig Taking? Authorizing Provider   buprenorphine-naloxone (SUBOXONE) 8-2 MG FILM SL film Place 0.5 Film under the tongue 2 times daily for 10 days.  Yes Franny Huerta MD   buPROPion (WELLBUTRIN XL) 150 MG extended release tablet Take 1 tablet by mouth every morning Yes Harlan Anderson MD   fluticasone (FLONASE) 50 MCG/ACT nasal spray 1 spray by Each Nostril route daily Yes Stephane Lung, APRN - CNP   cloNIDine (CATAPRES) 0.1 MG tablet Take 1 tablet by mouth 2 times daily as needed for High Blood Pressure Yes Franny Huerta MD        Social History     Tobacco Use    Smoking status: Current Every Day Smoker     Packs/day: 0.25     Years: 1.00     Pack years: 0.25     Types: Cigarettes    Smokeless tobacco: Never Used   Substance Use Topics    Alcohol use: Not Currently        Vitals:    21 1205   BP: 133/67   Site: Right Upper Arm   Pulse: 82 Temp: 97.6 °F (36.4 °C)   Weight: 122 lb 6.4 oz (55.5 kg)   Height: 5' 7\" (1.702 m)     Estimated body mass index is 19.17 kg/m² as calculated from the following:    Height as of this encounter: 5' 7\" (1.702 m). Weight as of this encounter: 122 lb 6.4 oz (55.5 kg). Physical Exam     Pupils normal size and reacting to light,  She is alert and oriented x 3, abd soft NT, BS, Lungs CTA,          ASSESSMENT/PLAN:     Diagnosis Orders   1. Severe opioid use disorder (HCC)  POCT Rapid Drug Screen    buprenorphine-naloxone (SUBOXONE) 8-2 MG FILM SL film    DISCONTINUED: buprenorphine-naloxone (SUBOXONE) 8-2 MG FILM SL film     Pt could not tolerate the sublocade shot , in the past . I spoke with her regarding and down the Suboxone to 8 mg half film twice daily as she is doing quite well, in the past were given her for 7 days but she is able to extend that out significantly. ChangesSuboxone 8 mg film half twice daily, she is tolerating it well    2. Hx of ? ADD -recently seen on virtual visit by Dr. Donald Grady. [facility]  after her  . She uses adderall on  a prn basis, before being on  Percocet. Psychiatry team is considering ordering various testing, for possible ADD. Now on Wellbutrin    GOAL:  To enhance patient recovery through the use of medication assisted treatment to improve overall quality of life. RTO in 10 days  and again encouraged to drink plenty of fluids mainly water before each office visit as she needs to give a urine sample. Prescription for 8 mg film to be used half twice daily for 10 days  #  10 prescribed today      An electronic signature was used to authenticate this note. --Jovita Stovall MD on 2021 at 1:11 PM      P. Results verified with Kaylyn Westbrook LPN. Dr. Ophelia Whatley notified.

## 2021-07-06 NOTE — PROGRESS NOTES
Per Dr. Sandi Fontenot urine drug ordered and resulted. Pos for BUP and AMP. Results verified with REINALDO Castillo. Dr. Sandi Fontenot notified.

## 2021-07-16 ENCOUNTER — OFFICE VISIT (OUTPATIENT)
Dept: INTERNAL MEDICINE CLINIC | Age: 31
End: 2021-07-16
Payer: COMMERCIAL

## 2021-07-16 VITALS
HEART RATE: 90 BPM | DIASTOLIC BLOOD PRESSURE: 84 MMHG | HEIGHT: 67 IN | SYSTOLIC BLOOD PRESSURE: 128 MMHG | BODY MASS INDEX: 19.15 KG/M2 | TEMPERATURE: 97.8 F | WEIGHT: 122 LBS

## 2021-07-16 DIAGNOSIS — F11.20 SEVERE OPIOID USE DISORDER (HCC): ICD-10-CM

## 2021-07-16 PROCEDURE — 4004F PT TOBACCO SCREEN RCVD TLK: CPT | Performed by: NURSE PRACTITIONER

## 2021-07-16 PROCEDURE — G8427 DOCREV CUR MEDS BY ELIG CLIN: HCPCS | Performed by: NURSE PRACTITIONER

## 2021-07-16 PROCEDURE — 99213 OFFICE O/P EST LOW 20 MIN: CPT | Performed by: NURSE PRACTITIONER

## 2021-07-16 PROCEDURE — 80305 DRUG TEST PRSMV DIR OPT OBS: CPT | Performed by: NURSE PRACTITIONER

## 2021-07-16 PROCEDURE — G8420 CALC BMI NORM PARAMETERS: HCPCS | Performed by: NURSE PRACTITIONER

## 2021-07-16 RX ORDER — BUPRENORPHINE AND NALOXONE 8; 2 MG/1; MG/1
1.5 FILM, SOLUBLE BUCCAL; SUBLINGUAL 2 TIMES DAILY
Qty: 42 FILM | Refills: 0 | Status: SHIPPED | OUTPATIENT
Start: 2021-07-16 | End: 2021-07-30

## 2021-08-02 ASSESSMENT — ENCOUNTER SYMPTOMS: NAUSEA: 1

## 2021-08-04 ENCOUNTER — OFFICE VISIT (OUTPATIENT)
Dept: INTERNAL MEDICINE CLINIC | Age: 31
End: 2021-08-04
Payer: COMMERCIAL

## 2021-08-04 VITALS
HEART RATE: 81 BPM | WEIGHT: 122 LBS | DIASTOLIC BLOOD PRESSURE: 80 MMHG | TEMPERATURE: 97.8 F | HEIGHT: 67 IN | BODY MASS INDEX: 19.15 KG/M2 | SYSTOLIC BLOOD PRESSURE: 121 MMHG

## 2021-08-04 DIAGNOSIS — F11.20 SEVERE OPIOID USE DISORDER (HCC): Primary | ICD-10-CM

## 2021-08-04 PROCEDURE — 99213 OFFICE O/P EST LOW 20 MIN: CPT | Performed by: NURSE PRACTITIONER

## 2021-08-04 PROCEDURE — 80305 DRUG TEST PRSMV DIR OPT OBS: CPT | Performed by: NURSE PRACTITIONER

## 2021-08-04 PROCEDURE — 4004F PT TOBACCO SCREEN RCVD TLK: CPT | Performed by: NURSE PRACTITIONER

## 2021-08-04 PROCEDURE — G8420 CALC BMI NORM PARAMETERS: HCPCS | Performed by: NURSE PRACTITIONER

## 2021-08-04 PROCEDURE — G8428 CUR MEDS NOT DOCUMENT: HCPCS | Performed by: NURSE PRACTITIONER

## 2021-08-04 RX ORDER — BUPRENORPHINE AND NALOXONE 8; 2 MG/1; MG/1
1.5 FILM, SOLUBLE BUCCAL; SUBLINGUAL DAILY
Qty: 32 FILM | Refills: 0 | Status: SHIPPED | OUTPATIENT
Start: 2021-08-04 | End: 2021-08-25 | Stop reason: SDUPTHER

## 2021-08-04 NOTE — PROGRESS NOTES
[address] INTERNAL MEDICINE AND MEDICATION MANAGEMENT  [address]  Dept: [phone]  Dept Fax: [phone]: [phone]     Visit Date:  8/4/2021    Patient:  Muriel Hodgkins  YOB: 1990    HPI:     Chief Complaint   Patient presents with    Drug Problem     Paola Quijano presents today for medical evaluation of severe opioid use disorder.     MAT patient of Dr. Gregory Montoya. I last seen her 2 weeks ago.      Urges and cravings better controlled with Suboxone 6 mg BID. States that she previously wanted to wean off of it, but has been having trouble sleeping and is having chills and nausea throughout the day. Urine positive for buprenorphine only     Medications    Current Outpatient Medications:     buprenorphine-naloxone (SUBOXONE) 8-2 MG FILM SL film, Place 1.5 Film under the tongue daily for 21 days. , Disp: 32 Film, Rfl: 0    buPROPion (WELLBUTRIN XL) 150 MG extended release tablet, Take 1 tablet by mouth every morning, Disp: 30 tablet, Rfl: 2    fluticasone (FLONASE) 50 MCG/ACT nasal spray, 1 spray by Each Nostril route daily, Disp: 1 Bottle, Rfl: 0    cloNIDine (CATAPRES) 0.1 MG tablet, Take 1 tablet by mouth 2 times daily as needed for High Blood Pressure, Disp: 60 tablet, Rfl: 3    The patient has No Known Allergies. Past Medical History  Paola Quijano  has a past medical history of MRSA (methicillin resistant staph aureus) culture positive. Past Surgical History  The patient  has a past surgical history that includes Ankle surgery. Family History  This patient's family history includes Cancer in her paternal grandmother; Emphysema in her maternal grandmother; Heart Attack in her paternal grandfather; Seizures in her father. Social History  Paola Quijano  reports that she has been smoking cigarettes. She has a 0.25 pack-year smoking history.  She has never used smokeless tobacco. She reports previous Mental Status: She is alert and oriented to person, place, and time. Psychiatric:         Mood and Affect: Mood normal.         Behavior: Behavior normal.         Thought Content: Thought content normal.         Judgment: Judgment normal.         Labs Reviewed 8/4/2021:    No results found for: WBC, HGB, HCT, PLT, CHOL, TRIG, HDL, LDLDIRECT, ALT, AST, NA, K, CL, CREATININE, BUN, CO2, TSH, PSA, INR, GLUF, LABA1C, LABMICR    Assessment/Plan      1. Severe opioid use disorder (HCC)    - POCT Rapid Drug Screen  - buprenorphine-naloxone (SUBOXONE) 8-2 MG FILM SL film; Place 1.5 Film under the tongue daily for 21 days. Dispense: 32 Film; Refill: 0  - OARRS reviewed, no discrepancies  - Narcan at home  - Continue counseling as scheduled    Return in about 3 weeks (around 8/25/2021). Patient given educational materials - see patient instructions. Discussed use, benefit, and side effects of prescribed medications. All patient questions answered. Pt voiced understanding. Reviewed health maintenance.        Electronically signed JAQUELINE Roger CNP on 8/4/21 at 3:30 PM EDT

## 2021-08-05 RX ORDER — METHYLPREDNISOLONE 4 MG/1
TABLET ORAL
Qty: 1 KIT | Refills: 0 | Status: SHIPPED | OUTPATIENT
Start: 2021-08-05 | End: 2021-08-11

## 2021-08-12 ENCOUNTER — TELEPHONE (OUTPATIENT)
Dept: PSYCHIATRY | Age: 31
End: 2021-08-12

## 2021-08-12 NOTE — TELEPHONE ENCOUNTER
Tisha Mitul called into the office stating that she was contacted by [place] and they are not able to complete her neuropsych testing due to her being over the age of 25. She had spoke with my coworker Eriberto Peterson) and she was informed to further discuss at her upcoming appt on 08/18/21. She understood. Other resources include: [facility] and [place].

## 2021-08-16 ASSESSMENT — ENCOUNTER SYMPTOMS: NAUSEA: 0

## 2021-08-18 ENCOUNTER — VIRTUAL VISIT (OUTPATIENT)
Dept: PSYCHIATRY | Age: 31
End: 2021-08-18
Payer: COMMERCIAL

## 2021-08-18 DIAGNOSIS — R41.840 ATTENTION DEFICIT: Primary | ICD-10-CM

## 2021-08-18 DIAGNOSIS — F41.9 ANXIETY: ICD-10-CM

## 2021-08-18 PROCEDURE — G8427 DOCREV CUR MEDS BY ELIG CLIN: HCPCS | Performed by: PSYCHIATRY & NEUROLOGY

## 2021-08-18 PROCEDURE — 90833 PSYTX W PT W E/M 30 MIN: CPT | Performed by: PSYCHIATRY & NEUROLOGY

## 2021-08-18 PROCEDURE — 99214 OFFICE O/P EST MOD 30 MIN: CPT | Performed by: PSYCHIATRY & NEUROLOGY

## 2021-08-18 RX ORDER — BUPROPION HYDROCHLORIDE 150 MG/1
150 TABLET, EXTENDED RELEASE ORAL 2 TIMES DAILY
Qty: 60 TABLET | Refills: 2 | Status: SHIPPED | OUTPATIENT
Start: 2021-08-18 | End: 2021-10-21 | Stop reason: SDUPTHER

## 2021-08-18 NOTE — PROGRESS NOTES
[address] [address]  [phone]    Progress Note    Patient:  Trent Redd  YOB: 1990  PCP:  Patric Mendieta MD  Visit Date:  2021    TELEHEALTH EVALUATION -- Audio/Visual (During [facility]- public health emergency)    Patient location: home  Physician location: [place], [facility]  This virtual visit was conducted via interactive, real-time video. Chief Complaint   Patient presents with    Follow-up    Medication Check    ADHD    Anxiety       SUBJECTIVE:    Time in: 2:34pm  Time out: 3:15pm  Time spent on documentation: 5 minutes    Trent Redd, a 33 y.o. female, presents for a follow up visit. Patient reports she is doing well. Patient is compliant with medication regimen. She presents alone. Enjoyed a trip to [place] with her dtr. Likes Wellbutrin. Notes mood improved and \"good\". Less anxiety. \"it cut my anxiety level\"  Energy improved \"more peppy\". Not much benefit for focus. Writes things down every day. Still forgetful. Forgot to call and enroll her dtr in school. Mood is \"good\". Her dog  7/10. It was very sudden. Grieving that loss. Felt more accepting of it after losing her  in the past.   Her dad is doing better and more vocal the last 2 weeks. \"I'm happy about that. \"   Her sister also has trouble with attention span. Has recently started Vyvanse. Note her sister \"all over the place\". Compares herself as more neat and organized \"I'm just OCD\". Notes Vyanse is making her sister more irritable. We discussed whether inattention is more related to anxiety or something else. Some dry mouth otherwise no side effects. She is drinking coffee in AM and [place] energy drink. Discussed that NPT option at [address] [place] [address] not available. She isn't sure about going to [address] [place] [address] or [facility].  Would like to look into something more local.   Feels like Wellbutrin wears off around afternoon. She would like to try different formulation of Wellbutrin with BID dosing (SR) so she can see if lasts longer through the day. I'm ok with that. Spent much of session in psychotherapy discussing psychoeducation, current stressors, grief, supportive therapy. Med Trials:Paxil (disliked, \"zombie\", nausea)       OBJECTIVE:  Vitals: There were no vitals taken for this visit. MENTAL STATUS EXAM:    GENERAL  Build: Normal    Hygiene:  Appropriate    SENSORIUM Orientation: Place, Person, Time, & Situation     Consciousness: Alert    ATTENTION   EasilyDistracted    RELATEDNESS  Cooperative    EYE CONTACT   Good    PSYCHOMOTOR  Normal    SPEECH Volume: Normal     Rate: Normal rate and tone    Amplitude: Within normal limits, talkative   MOOD  Euthymic    AFFECT Range: Full    THOUGHT Process:  Goal-Directed and Perseveration     Content: no evidence of psychosis    COGNITION Insight: Good    Judgement:  Intact    MEMORY  no gross deficits, did not test    INTELLIGENCE  Average     Mobility/Gait: Independently     Controlled SubstancesMonitoring:   not done      ASSESSMENT: Will change Wellbutrin XL to SR formulation. Notes mood and anxiety benefit with starting it. Not much benefit for focus yet. Looking into NPT options to r/o ADHD. Diagnosis Orders   1. Attention deficit     2. Anxiety     R/o ADHD, LESA  Medical Hx:     PLAN:     · Medications:   · Stop Wellbutrin  mg QAM  · Start Wellbutrin  mg BID   · Therapy: none, will offer  · Labs/Tests/Imaging: none   · Records Reviewed: [facility]  · Patient advised to call if patient has any difficulties with treatment  Return in about 4 weeks (around 9/15/2021) for follow up, med check. I spent 41 minutes face to face with this patient. I spent 16 minutes or longer in psychotherapy discussing psychoeducation, current stressors, grief, supportive therapy.  Remainder of time spent on symptom evaluation and

## 2021-08-25 ENCOUNTER — OFFICE VISIT (OUTPATIENT)
Dept: INTERNAL MEDICINE CLINIC | Age: 31
End: 2021-08-25
Payer: COMMERCIAL

## 2021-08-25 VITALS
BODY MASS INDEX: 19.62 KG/M2 | WEIGHT: 125 LBS | DIASTOLIC BLOOD PRESSURE: 81 MMHG | SYSTOLIC BLOOD PRESSURE: 135 MMHG | HEART RATE: 88 BPM | HEIGHT: 67 IN | TEMPERATURE: 98.2 F

## 2021-08-25 DIAGNOSIS — F11.20 SEVERE OPIOID USE DISORDER (HCC): Primary | ICD-10-CM

## 2021-08-25 PROCEDURE — 99211 OFF/OP EST MAY X REQ PHY/QHP: CPT | Performed by: NURSE PRACTITIONER

## 2021-08-25 PROCEDURE — 80305 DRUG TEST PRSMV DIR OPT OBS: CPT | Performed by: NURSE PRACTITIONER

## 2021-08-25 PROCEDURE — G8427 DOCREV CUR MEDS BY ELIG CLIN: HCPCS | Performed by: NURSE PRACTITIONER

## 2021-08-25 PROCEDURE — G8420 CALC BMI NORM PARAMETERS: HCPCS | Performed by: NURSE PRACTITIONER

## 2021-08-25 RX ORDER — BUPRENORPHINE AND NALOXONE 8; 2 MG/1; MG/1
1.5 FILM, SOLUBLE BUCCAL; SUBLINGUAL DAILY
Qty: 32 FILM | Refills: 0 | Status: SHIPPED | OUTPATIENT
Start: 2021-08-25 | End: 2021-09-15 | Stop reason: SDUPTHER

## 2021-08-25 NOTE — PROGRESS NOTES
Verbal order per Carlos Luong CNP for urine drug screen. Positive for BUP. Verified results with Gelacio Zimmer LPN. Daysi Render sent script for Suboxone for 3 weeks to pharmacy.  Patient will be seen back in office on 9/15/21

## 2021-09-15 ENCOUNTER — VIRTUAL VISIT (OUTPATIENT)
Dept: INTERNAL MEDICINE CLINIC | Age: 31
End: 2021-09-15
Payer: COMMERCIAL

## 2021-09-15 DIAGNOSIS — F11.20 SEVERE OPIOID USE DISORDER (HCC): ICD-10-CM

## 2021-09-15 PROCEDURE — 99213 OFFICE O/P EST LOW 20 MIN: CPT | Performed by: NURSE PRACTITIONER

## 2021-09-15 PROCEDURE — G8428 CUR MEDS NOT DOCUMENT: HCPCS | Performed by: NURSE PRACTITIONER

## 2021-09-15 RX ORDER — BUPRENORPHINE AND NALOXONE 8; 2 MG/1; MG/1
1.5 FILM, SOLUBLE BUCCAL; SUBLINGUAL DAILY
Qty: 42 FILM | Refills: 0 | Status: SHIPPED | OUTPATIENT
Start: 2021-09-15 | End: 2021-10-18 | Stop reason: SDUPTHER

## 2021-09-15 ASSESSMENT — ENCOUNTER SYMPTOMS: NAUSEA: 0

## 2021-09-15 NOTE — PROGRESS NOTES
Christ Connors (:  1990) is a 33 y.o. female,Established patient, here for evaluation of the following chief complaint(s): Severe Opioid Use Disorder       ASSESSMENT/PLAN:    1. Severe opioid use disorder (HCC)    -     buprenorphine-naloxone (SUBOXONE) 8-2 MG FILM SL film; Place 1.5 Film under the tongue daily for 28 days. , Disp-42 Film, R-0Normal  - OARRS reviewed, no discrepancies  - Narcan at home  - Continue counseling as scheduled    Return in about 4 weeks (around 10/13/2021) for at 3:40 pm.       SUBJECTIVE/OBJECTIVE:    I last seen her 3 weeks ago.      Patient location: Home  Provider location: Office  Others present/name/role: none    Urges and cravings better controlled with Suboxone 6 mg BID.      States that she previously wanted to wean off of it, but has been having trouble sleeping and is having chills and nausea throughout the day.      Review of Systems   Constitutional: Negative for chills. Gastrointestinal: Negative for nausea. Psychiatric/Behavioral: Negative for sleep disturbance. All other systems reviewed and are negative. No flowsheet data found.      Physical Exam    [INSTRUCTIONS:  \"[x]\" Indicates a positive item  \"[]\" Indicates a negative item  -- DELETE ALL ITEMS NOT EXAMINED]    Constitutional: [x] Appears well-developed and well-nourished [x] No apparent distress      [] Abnormal -     Mental status: [x] Alert and awake  [x] Oriented to person/place/time [x] Able to follow commands    [] Abnormal -     Eyes:   EOM    [x]  Normal    [] Abnormal -   Sclera  [x]  Normal    [] Abnormal -          Discharge [x]  None visible   [] Abnormal -     HENT: [x] Normocephalic, atraumatic  [] Abnormal -   [x] Mouth/Throat: Mucous membranes are moist    External Ears [x] Normal  [] Abnormal -    Neck: [x] No visualized mass [] Abnormal -     Pulmonary/Chest: [x] Respiratory effort normal   [x] No visualized signs of difficulty breathing or respiratory distress        []

## 2021-10-18 ENCOUNTER — OFFICE VISIT (OUTPATIENT)
Dept: INTERNAL MEDICINE CLINIC | Age: 31
End: 2021-10-18
Payer: COMMERCIAL

## 2021-10-18 VITALS
HEART RATE: 86 BPM | HEIGHT: 67 IN | BODY MASS INDEX: 20.09 KG/M2 | TEMPERATURE: 98 F | SYSTOLIC BLOOD PRESSURE: 127 MMHG | WEIGHT: 128 LBS | DIASTOLIC BLOOD PRESSURE: 85 MMHG

## 2021-10-18 DIAGNOSIS — F11.20 SEVERE OPIOID USE DISORDER (HCC): Primary | ICD-10-CM

## 2021-10-18 PROCEDURE — 80305 DRUG TEST PRSMV DIR OPT OBS: CPT | Performed by: NURSE PRACTITIONER

## 2021-10-18 PROCEDURE — 4004F PT TOBACCO SCREEN RCVD TLK: CPT | Performed by: NURSE PRACTITIONER

## 2021-10-18 PROCEDURE — G8420 CALC BMI NORM PARAMETERS: HCPCS | Performed by: NURSE PRACTITIONER

## 2021-10-18 PROCEDURE — 99213 OFFICE O/P EST LOW 20 MIN: CPT | Performed by: NURSE PRACTITIONER

## 2021-10-18 PROCEDURE — G8427 DOCREV CUR MEDS BY ELIG CLIN: HCPCS | Performed by: NURSE PRACTITIONER

## 2021-10-18 PROCEDURE — G8484 FLU IMMUNIZE NO ADMIN: HCPCS | Performed by: NURSE PRACTITIONER

## 2021-10-18 RX ORDER — BUPRENORPHINE AND NALOXONE 8; 2 MG/1; MG/1
1.5 FILM, SOLUBLE BUCCAL; SUBLINGUAL DAILY
Qty: 42 FILM | Refills: 0 | Status: SHIPPED | OUTPATIENT
Start: 2021-10-18 | End: 2021-11-16 | Stop reason: SDUPTHER

## 2021-10-21 ENCOUNTER — VIRTUAL VISIT (OUTPATIENT)
Dept: PSYCHIATRY | Age: 31
End: 2021-10-21
Payer: COMMERCIAL

## 2021-10-21 DIAGNOSIS — F41.9 ANXIETY: ICD-10-CM

## 2021-10-21 DIAGNOSIS — R41.840 ATTENTION DEFICIT: Primary | ICD-10-CM

## 2021-10-21 PROCEDURE — 99214 OFFICE O/P EST MOD 30 MIN: CPT | Performed by: PSYCHIATRY & NEUROLOGY

## 2021-10-21 RX ORDER — BUPROPION HYDROCHLORIDE 200 MG/1
200 TABLET, EXTENDED RELEASE ORAL 2 TIMES DAILY
Qty: 60 TABLET | Refills: 1 | Status: SHIPPED | OUTPATIENT
Start: 2021-10-21 | End: 2021-12-23 | Stop reason: SDUPTHER

## 2021-10-21 NOTE — PROGRESS NOTES
other   Has upcoming NPT in [facility] in 6-8 weeks, sooner if needed   Call with any issues      I affirm this is a Patient Initiated Episode with a Patient who has not had a related appointment within my department in the past 7 days or scheduled within the next 24 hours. Patient identification was verified at the start of the visit: Yes    Total Time: minutes: 21-30 minutes (37 min total)  Time in: 3:08pm, Time out: 3:40pm Time spent on documentation: 5 min    The visit was conducted pursuant to the emergency declaration under the [address] [place] [address], [address] [place] [address] authority and the [employer] General Act. Patient identification was verified, and a caregiver was present when appropriate. The patient was located in a state where the provider was credentialed to provide care.     Note: not billable if this call serves to triage the patient into an appointment for the relevant concern    Electronically signed by Neil Del Real MD on 10/21/2021 at 6:37 PM

## 2021-10-31 ASSESSMENT — ENCOUNTER SYMPTOMS: NAUSEA: 0

## 2021-11-16 ENCOUNTER — OFFICE VISIT (OUTPATIENT)
Dept: INTERNAL MEDICINE CLINIC | Age: 31
End: 2021-11-16
Payer: COMMERCIAL

## 2021-11-16 VITALS
TEMPERATURE: 98.2 F | WEIGHT: 130 LBS | SYSTOLIC BLOOD PRESSURE: 136 MMHG | HEIGHT: 67 IN | HEART RATE: 97 BPM | BODY MASS INDEX: 20.4 KG/M2 | DIASTOLIC BLOOD PRESSURE: 83 MMHG

## 2021-11-16 DIAGNOSIS — F11.20 SEVERE OPIOID USE DISORDER (HCC): Primary | ICD-10-CM

## 2021-11-16 PROCEDURE — G8427 DOCREV CUR MEDS BY ELIG CLIN: HCPCS | Performed by: NURSE PRACTITIONER

## 2021-11-16 PROCEDURE — G8484 FLU IMMUNIZE NO ADMIN: HCPCS | Performed by: NURSE PRACTITIONER

## 2021-11-16 PROCEDURE — 80305 DRUG TEST PRSMV DIR OPT OBS: CPT | Performed by: NURSE PRACTITIONER

## 2021-11-16 PROCEDURE — 4004F PT TOBACCO SCREEN RCVD TLK: CPT | Performed by: NURSE PRACTITIONER

## 2021-11-16 PROCEDURE — 99213 OFFICE O/P EST LOW 20 MIN: CPT | Performed by: NURSE PRACTITIONER

## 2021-11-16 PROCEDURE — G8420 CALC BMI NORM PARAMETERS: HCPCS | Performed by: NURSE PRACTITIONER

## 2021-11-16 RX ORDER — BUPRENORPHINE AND NALOXONE 8; 2 MG/1; MG/1
1.5 FILM, SOLUBLE BUCCAL; SUBLINGUAL DAILY
Qty: 42 FILM | Refills: 0 | Status: SHIPPED | OUTPATIENT
Start: 2021-11-16 | End: 2021-12-13 | Stop reason: SDUPTHER

## 2021-11-16 NOTE — PROGRESS NOTES
[address] INTERNAL MEDICINE AND MEDICATION MANAGEMENT  [address] [facility] [address]  Dept: [phone]  Dept Fax: [phone]: [phone]     Visit Date:  11/16/2021    Patient:  Reagan Driscoll  YOB: 1990    HPI:     Chief Complaint   Patient presents with    Drug Problem     Kaylah Rangel presents today for medical evaluation of severe opioid use disorder.     I last seen her 4 weeks ago.      Urges and cravings better controlled with Suboxone 6 mg BID.      Urine positive for buprenorphine only      She denies any use     Is active in counseling     Hep C negative    Medications    Current Outpatient Medications:     buprenorphine-naloxone (SUBOXONE) 8-2 MG FILM SL film, Place 1.5 Film under the tongue daily for 28 days. , Disp: 42 Film, Rfl: 0    buPROPion (WELLBUTRIN SR) 200 MG extended release tablet, Take 1 tablet by mouth 2 times daily, Disp: 60 tablet, Rfl: 1    fluticasone (FLONASE) 50 MCG/ACT nasal spray, 1 spray by Each Nostril route daily, Disp: 1 Bottle, Rfl: 0    cloNIDine (CATAPRES) 0.1 MG tablet, Take 1 tablet by mouth 2 times daily as needed for High Blood Pressure, Disp: 60 tablet, Rfl: 3    The patient has No Known Allergies. Past Medical History  Kaylah Rangel  has a past medical history of MRSA (methicillin resistant staph aureus) culture positive. Past Surgical History  The patient  has a past surgical history that includes Ankle surgery. Family History  This patient's family history includes Cancer in her paternal grandmother; Emphysema in her maternal grandmother; Heart Attack in her paternal grandfather; Seizures in her father. Social History  Kaylah Rangel  reports that she has been smoking cigarettes. She has a 0.25 pack-year smoking history. She has never used smokeless tobacco. She reports previous alcohol use. She reports previous drug use.     Health Maintenance:    Health Maintenance   Topic Date Due    Hepatitis C screen  Never done    Varicella vaccine (1 of 2 - 2-dose childhood series) Never done    COVID-19 Vaccine (1) Never done    Pneumococcal 0-64 years Vaccine (1 of 2 - PPSV23) Never done    HIV screen  Never done    DTaP/Tdap/Td vaccine (1 - Tdap) Never done    Cervical cancer screen  Never done    Flu vaccine (1) Never done    Hepatitis A vaccine  Aged Out    Hepatitis B vaccine  Aged Out    Hib vaccine  Aged Out    Meningococcal (ACWY) vaccine  Aged Out       Subjective:      Review of Systems   Constitutional: Negative for chills. Gastrointestinal: Negative for nausea. Psychiatric/Behavioral: Negative for sleep disturbance. All other systems reviewed and are negative. Objective:     /83 (Site: Left Lower Arm, Position: Sitting, Cuff Size: Medium Adult)   Pulse 97   Temp 98.2 °F (36.8 °C) (Temporal)   Ht 5' 7\" (1.702 m)   Wt 130 lb (59 kg)   BMI 20.36 kg/m²     Physical Exam  Vitals reviewed. Constitutional:       General: She is not in acute distress. Appearance: Normal appearance. She is not ill-appearing. HENT:      Head: Normocephalic and atraumatic. Right Ear: External ear normal.      Left Ear: External ear normal.      Nose: Nose normal. No congestion or rhinorrhea. Mouth/Throat:      Mouth: Mucous membranes are moist.   Eyes:      Extraocular Movements: Extraocular movements intact. Conjunctiva/sclera: Conjunctivae normal.      Pupils: Pupils are equal, round, and reactive to light. Pulmonary:      Effort: Pulmonary effort is normal. No respiratory distress. Musculoskeletal:         General: Normal range of motion. Cervical back: Normal range of motion and neck supple. Right lower leg: No edema. Left lower leg: No edema. Skin:     General: Skin is warm and dry. Neurological:      General: No focal deficit present. Mental Status: She is alert and oriented to person, place, and time.    Psychiatric: Mood and Affect: Mood normal.         Behavior: Behavior normal.         Thought Content: Thought content normal.         Judgment: Judgment normal.         Labs Reviewed 11/16/2021:    No results found for: WBC, HGB, HCT, PLT, CHOL, TRIG, HDL, LDLDIRECT, ALT, AST, NA, K, CL, CREATININE, BUN, CO2, TSH, PSA, INR, GLUF, LABA1C, LABMICR    Assessment/Plan      1. Severe opioid use disorder (HCC)    - POCT Rapid Drug Screen  - buprenorphine-naloxone (SUBOXONE) 8-2 MG FILM SL film; Place 1.5 Film under the tongue daily for 28 days. Dispense: 42 Film; Refill: 0  - OARRS reviewed, no discrepancies  - Narcan at home  - Continue counseling as scheduled    Return in about 4 weeks (around 12/14/2021). Patient given educational materials - see patient instructions. Discussed use, benefit, and side effects of prescribed medications. All patient questions answered. Pt voiced understanding. Reviewed health maintenance.        Electronically signed JAQUELINE Fajardo CNP on 11/16/21 at 4:15 PM EST

## 2021-11-23 ASSESSMENT — ENCOUNTER SYMPTOMS: NAUSEA: 0

## 2021-12-13 ENCOUNTER — OFFICE VISIT (OUTPATIENT)
Dept: INTERNAL MEDICINE CLINIC | Age: 31
End: 2021-12-13
Payer: COMMERCIAL

## 2021-12-13 VITALS
HEIGHT: 67 IN | DIASTOLIC BLOOD PRESSURE: 86 MMHG | WEIGHT: 132 LBS | HEART RATE: 86 BPM | SYSTOLIC BLOOD PRESSURE: 139 MMHG | TEMPERATURE: 98.3 F | BODY MASS INDEX: 20.72 KG/M2

## 2021-12-13 DIAGNOSIS — F11.20 SEVERE OPIOID USE DISORDER (HCC): Primary | ICD-10-CM

## 2021-12-13 DIAGNOSIS — Z51.81 ENCOUNTER FOR MONITORING SUBOXONE MAINTENANCE THERAPY: ICD-10-CM

## 2021-12-13 DIAGNOSIS — Z79.899 ENCOUNTER FOR MONITORING SUBOXONE MAINTENANCE THERAPY: ICD-10-CM

## 2021-12-13 PROCEDURE — G8427 DOCREV CUR MEDS BY ELIG CLIN: HCPCS | Performed by: INTERNAL MEDICINE

## 2021-12-13 PROCEDURE — 80305 DRUG TEST PRSMV DIR OPT OBS: CPT | Performed by: INTERNAL MEDICINE

## 2021-12-13 PROCEDURE — 99214 OFFICE O/P EST MOD 30 MIN: CPT | Performed by: INTERNAL MEDICINE

## 2021-12-13 PROCEDURE — 4004F PT TOBACCO SCREEN RCVD TLK: CPT | Performed by: INTERNAL MEDICINE

## 2021-12-13 PROCEDURE — G8420 CALC BMI NORM PARAMETERS: HCPCS | Performed by: INTERNAL MEDICINE

## 2021-12-13 PROCEDURE — G8484 FLU IMMUNIZE NO ADMIN: HCPCS | Performed by: INTERNAL MEDICINE

## 2021-12-13 RX ORDER — BUPRENORPHINE AND NALOXONE 8; 2 MG/1; MG/1
1 FILM, SOLUBLE BUCCAL; SUBLINGUAL 2 TIMES DAILY
Qty: 32 FILM | Refills: 0 | Status: SHIPPED | OUTPATIENT
Start: 2021-12-13 | End: 2021-12-29

## 2021-12-13 NOTE — PROGRESS NOTES
MEDICATION ASSISTED TREATMENT ENCOUNTER    HISTORY OF PRESENT ILLNESS  Patient presents for evaluation of opioid use disorder and wants to be placed on medication assisted treatment  Patient normally sees Rosie Henry who is out today  Patient has had problems using pain pills  She had knee surgery about 3 and half years ago was prescribed pain pills  She started getting some from a friend after they ran out  Never went to heroin or fentanyl  Patient uses it po  He was at a Suboxone clinic in [facility] then went to see Dr. Mohamud Hidden  Now she sees Rosie Henry  She has been clean over 2 years  Past Medical History:   Diagnosis Date    MRSA (methicillin resistant staph aureus) culture positive        Past Surgical History:   Procedure Laterality Date    ANKLE SURGERY         PAST PSYCHIATRIC HISTORY: no    No Known Allergies    Current Outpatient Medications   Medication Sig Dispense Refill    buprenorphine-naloxone (SUBOXONE) 8-2 MG FILM SL film Place 1 Film under the tongue 2 times daily for 16 days. 32 Film 0    buPROPion (WELLBUTRIN SR) 200 MG extended release tablet Take 1 tablet by mouth 2 times daily 60 tablet 1    fluticasone (FLONASE) 50 MCG/ACT nasal spray 1 spray by Each Nostril route daily 1 Bottle 0    cloNIDine (CATAPRES) 0.1 MG tablet Take 1 tablet by mouth 2 times daily as needed for High Blood Pressure 60 tablet 3     No current facility-administered medications for this visit.          SOCIAL     Marital status      Children one daughter     Employment patient has an [employer] [employer] parents     Legal issues no prison or MCC     Tobacco: yes, cigarettes     Alcohol no                 ROS     General: Patient says she has been having some urges and cravings recently  She has no side effects from [facility]    Blood pressure 139/86, pulse 86, temperature 98.3 °F (36.8 °C), temperature source Temporal, height 5' 7\" (1.702 m), weight 132 lb (59.9 kg). Mental status examination    Cognition: oriented to person place and time      Appearance: Patient is in no acute distress, normal appearance, patient does not appear intoxicated/    Memory: Normal    Behavior/motor: Normal    Mood: Good mood, upbeat    Affect: Mood congruent    Attitude toward examiner: Pleasant, respectful    Thought content no delusions hallucinations suicidal ideation    Insight: good    Judgment: good    Eyes: Pupils normal    Skin: No track marks noted ,no rashes noted    COWS score: N/A              URINE DRUG SCREEN TODAY:  Recent Labs     12/13/21  1729   ALCOHOL NEG   LABAMPH NEG   LABBARB NEG   LABBENZ NEG   BUPRENUR POS   COCAIMETSCRU NEG   FENTSCRUR NEG   GABAPENTIN N/A   MDMA NEG   METAMPU NEG   LABMETH NEG   OPIATESCREENURINE NEG   OXTCOSU NEG   PHENCYCLIDINESCREENURINE NEG   PROPOXYPHENE N/A   SPICEUR NEG   THCSCREENUR NEG   TRAMADOLUR NEG   TRICYUR N/A           Diagnosis Orders   1. Severe opioid use disorder (HCC)  POCT Rapid Drug Screen    buprenorphine-naloxone (SUBOXONE) 8-2 MG FILM SL film   2.  Encounter for monitoring Suboxone maintenance therapy           PLAN:  Patient is on 12 mg of Suboxone daily  She has been having some urges and cravings  Urine shows nothing illicit  I am going to bump her up from 12 mg to 16 mg Suboxone daily  Since I am doing this I am going to see her back in a couple of weeks to see how the increased dose is working  I discussed a new phone terri dealing with substance use disorder with the patient  It is called reSet  It is a 24/7 hour system that the patient can use at any time  There are lesson plans, tracking of treatment, rewards, etc  I told the patient's I will also have access to their account to track their progress  Patient will discuss with Noe De La Garza and sign up if interested  I reviewed the [address] [place] [address] Dr Automated Rx Reporting System report     There does not appear to be any discrepancies or overprescribing of controlled substances

## 2021-12-15 NOTE — PROGRESS NOTES
Verbal order per Dr Ty Leary for urine drug screen. Positive for BUP. Verified results with Jluis Carrera. Dr Ty Leary ordered patient Suboxone 8 mg film BID. Verified dose with patient. Patient was sent home with 16 day script of Suboxone 8 mg film BID and will be seen back in office on 12/29/21.

## 2021-12-22 ENCOUNTER — TELEPHONE (OUTPATIENT)
Dept: INTERNAL MEDICINE CLINIC | Age: 31
End: 2021-12-22

## 2021-12-23 ENCOUNTER — VIRTUAL VISIT (OUTPATIENT)
Dept: PSYCHIATRY | Age: 31
End: 2021-12-23
Payer: COMMERCIAL

## 2021-12-23 DIAGNOSIS — R41.840 ATTENTION DEFICIT: Primary | ICD-10-CM

## 2021-12-23 DIAGNOSIS — F41.9 ANXIETY: ICD-10-CM

## 2021-12-23 PROCEDURE — G8427 DOCREV CUR MEDS BY ELIG CLIN: HCPCS | Performed by: PSYCHIATRY & NEUROLOGY

## 2021-12-23 PROCEDURE — 90833 PSYTX W PT W E/M 30 MIN: CPT | Performed by: PSYCHIATRY & NEUROLOGY

## 2021-12-23 PROCEDURE — 99213 OFFICE O/P EST LOW 20 MIN: CPT | Performed by: PSYCHIATRY & NEUROLOGY

## 2021-12-23 RX ORDER — BUPROPION HYDROCHLORIDE 200 MG/1
200 TABLET, EXTENDED RELEASE ORAL 2 TIMES DAILY
Qty: 60 TABLET | Refills: 3 | Status: SHIPPED | OUTPATIENT
Start: 2021-12-23 | End: 2022-03-10 | Stop reason: SDUPTHER

## 2021-12-23 NOTE — PROGRESS NOTES
[address] [facility] PSYCHIATRY  [facility] [id number] [phone]    Progress Note    Patient:  Shiela Mishra  YOB: 1990  PCP:  No primary care provider on file. Visit Date:  12/23/2021    TELEHEALTH EVALUATION -- Audio/Visual (During RVSKL-65 public health emergency)    Patient location: home  Physician location: [address]   This virtual visit was conducted via interactive, real-time video. Chief Complaint   Patient presents with    Follow-up    Medication Check    Anxiety    Depression       SUBJECTIVE:    Time in: 4:41pm  Time out: 5:15pm  Time spent on documentation: 5 minutes    Shiela Mishra, a 33 y.o. female, presents for a follow up visit. Patient reports she is doing well. Patient is compliant with medication regimen. She presents alone. With Xmas busier and more sentimental.   She decided to wait for NPT in [place] until after holidays. Her dtr was sick x a week. Her dad went into the hospital. Not in a rush to get the testing done noting her priorities with her family. Her mom went to [place] and wasn't able to keep her dtr. Her  is out of town. Still trouble with focus and attention. Shows me a dresser she has on the floor to assemble for her dtr. Trouble getting projects like that done. It's been there x 3 weeks. Notes benefit with last Wellbutrin increase. Mood is improved, \"happy\". More patience, easier to relax and less resistant to things she needs to do. Less anxiety. Can tell if she misses a dose. Visits her dad around 3-4 times per week. Anywhere from 3-5 hours at a time. Feels sluggish the day after. Does a lot for him while he's there. Worries about whether to bring him home. Her mom doesn't feel she can handle it. Worries about the big cost per month. Declines to make any med changes today.    Will await NPT to look betty attention/focus problem and consider need for further treatment. Spent much of session in psychotherapy discussing current stressors, coping skills, strategies for change, avoidance behaviors, supportive therapy, psycheducation. Med Trials:Paxil (disliked, \"zombie\", nausea)       OBJECTIVE:  Vitals: There were no vitals taken for this visit. MENTAL STATUS EXAM:    GENERAL  Build: Normal    Hygiene:  Appropriate in casual dress   SENSORIUM Orientation: Place, Person, Time, & Situation     Consciousness: Alert    ATTENTION   Focused, mostly    RELATEDNESS  Cooperative    EYE CONTACT   Good    PSYCHOMOTOR  Normal    SPEECH Volume: Normal     Rate: Normal rate and tone    Amplitude: Within normal limits, talkative   MOOD  \"happy\"    AFFECT Range: Full, mood congruent   THOUGHT Process:  Goal-Directed and Perseveration     Content: no evidence of psychosis    COGNITION Insight: Good    Judgement:  Intact    MEMORY  no gross deficits, did not test    INTELLIGENCE  Average     Mobility/Gait: Independently     Controlled SubstancesMonitoring:   not done      ASSESSMENT: No med changes today. Mood and anxiety improved with last Wellbutrin increase. Plans to get NPT in [place] after holidays to r/o ADHD. Diagnosis Orders   1. Attention deficit     2. Anxiety     R/o ADHD, LESA  Medical Hx:     PLAN:     · Medications:   · Wellbutrin  mg BID   · Therapy: none, will offer  · Labs/Tests/Imaging: none   · Records Reviewed: [facility]  · Patient advised to call if patient has any difficulties with treatment  Return in about 10 weeks (around 3/3/2022) for med check, follow up. I spent 34 minutes face to face with this patient. I spent 20 minutes or longer in psychotherapy discussing current stressors, coping skills, strategies for change, avoidance behaviors, supportive therapy, psycheducation. Remainder of time spent on symptom evaluation and medication management.        Electronically signed by Neil Del Real MD on 12/23/2021 at 5:30 PM    Brittney Stinson is a 33 y.o. female being evaluated by a Virtual Visit (video visit) to address concerns as mentioned above. A caregiver was present when appropriate. Due to this being a TeleHealth encounter (EWJNB-82 public health emergency), evaluation of the following organ systems was limited: Vitals/Constitutional/EENT/Resp/CV/GI//MS/Neuro/Skin/Heme-Lymph-Imm. Pursuant to the emergency declaration under the [address] [place] [address] and the [employer] Act, this Virtual Visit was conducted with patient's (and/or legal guardian's) consent, to reduce the patient's risk of exposure to COVID-19 and provide necessary medical care. The patient (and/or legal guardian) has also been advised to contact this office for worsening conditions or problems, and seek emergency medical treatment and/or call 911 if deemed necessary. Patient identification was verified at the start of the visit: Yes     Total time spent for this encounter: 39 minutes     Services were provided through a video synchronous discussion virtually to substitute for in-person clinic visit. Patient and provider were located at their individual homes.     Electronically signed by Yovani Johnson MD on 12/23/2021 at 5:30 PM

## 2021-12-28 ENCOUNTER — OFFICE VISIT (OUTPATIENT)
Dept: INTERNAL MEDICINE CLINIC | Age: 31
End: 2021-12-28
Payer: COMMERCIAL

## 2021-12-28 VITALS
HEIGHT: 67 IN | HEART RATE: 117 BPM | TEMPERATURE: 97.2 F | SYSTOLIC BLOOD PRESSURE: 104 MMHG | DIASTOLIC BLOOD PRESSURE: 62 MMHG | RESPIRATION RATE: 18 BRPM | BODY MASS INDEX: 21.28 KG/M2 | WEIGHT: 135.6 LBS

## 2021-12-28 DIAGNOSIS — Z79.899 ENCOUNTER FOR MONITORING SUBOXONE MAINTENANCE THERAPY: ICD-10-CM

## 2021-12-28 DIAGNOSIS — Z51.81 ENCOUNTER FOR MONITORING SUBOXONE MAINTENANCE THERAPY: ICD-10-CM

## 2021-12-28 DIAGNOSIS — F11.20 SEVERE OPIOID USE DISORDER (HCC): Primary | ICD-10-CM

## 2021-12-28 PROCEDURE — 4004F PT TOBACCO SCREEN RCVD TLK: CPT | Performed by: INTERNAL MEDICINE

## 2021-12-28 PROCEDURE — 80305 DRUG TEST PRSMV DIR OPT OBS: CPT | Performed by: INTERNAL MEDICINE

## 2021-12-28 PROCEDURE — G8420 CALC BMI NORM PARAMETERS: HCPCS | Performed by: INTERNAL MEDICINE

## 2021-12-28 PROCEDURE — G8427 DOCREV CUR MEDS BY ELIG CLIN: HCPCS | Performed by: INTERNAL MEDICINE

## 2021-12-28 PROCEDURE — G8484 FLU IMMUNIZE NO ADMIN: HCPCS | Performed by: INTERNAL MEDICINE

## 2021-12-28 PROCEDURE — 99214 OFFICE O/P EST MOD 30 MIN: CPT | Performed by: INTERNAL MEDICINE

## 2021-12-28 RX ORDER — BUPRENORPHINE AND NALOXONE 8; 2 MG/1; MG/1
1 FILM, SOLUBLE BUCCAL; SUBLINGUAL 2 TIMES DAILY
Qty: 56 FILM | Refills: 0 | Status: SHIPPED | OUTPATIENT
Start: 2021-12-28 | End: 2022-01-25 | Stop reason: SDUPTHER

## 2021-12-28 NOTE — PROGRESS NOTES
MEDICATION ASSISTED TREATMENT ENCOUNTER    HISTORY OF PRESENT ILLNESS  Patient presents for evaluation of opioid use disorder and wants to be placed on medication assisted treatment  Patient normally sees Latoya Snider who is out today  Patient has had problems using pain pills  She had knee surgery about 3 and half years ago was prescribed pain pills  She started getting some from a friend after they ran out  Never went to heroin or fentanyl  Patient uses it po  He was at a Suboxone clinic in [facility] then went to see Dr. Alexis Smart  Now she sees Latoya Snider  She has been clean over 2 years  Past Medical History:   Diagnosis Date    MRSA (methicillin resistant staph aureus) culture positive        Past Surgical History:   Procedure Laterality Date    ANKLE SURGERY         PAST PSYCHIATRIC HISTORY: no    No Known Allergies    Current Outpatient Medications   Medication Sig Dispense Refill    buprenorphine-naloxone (SUBOXONE) 8-2 MG FILM SL film Place 1 Film under the tongue 2 times daily for 28 days. 56 Film 0    buPROPion (WELLBUTRIN SR) 200 MG extended release tablet Take 1 tablet by mouth 2 times daily 60 tablet 3    fluticasone (FLONASE) 50 MCG/ACT nasal spray 1 spray by Each Nostril route daily 1 Bottle 0    cloNIDine (CATAPRES) 0.1 MG tablet Take 1 tablet by mouth 2 times daily as needed for High Blood Pressure 60 tablet 3     No current facility-administered medications for this visit.          SOCIAL     Marital status      Children one daughter     Employment patient has an [facility] [facility]     Legal issues no prison or senior care     Tobacco: yes, cigarettes     Alcohol no                 ROS     General: Patient says she has been having some urges and cravings recently  She has no side effects from [facility] [place]    Blood pressure 104/62, pulse 117, temperature 97.2 °F (36.2 °C), temperature source Tympanic, resp. rate 18, height 5' 7\" (1.702 m), weight 135 lb 9.6 oz (61.5 kg). Mental status examination    Cognition: oriented to person place and time      Appearance: Patient is in no acute distress, normal appearance, patient does not appear intoxicated/    Memory: Normal    Behavior/motor: Normal    Mood: Good mood, upbeat    Affect: Mood congruent    Attitude toward examiner: Pleasant, respectful    Thought content no delusions hallucinations suicidal ideation    Insight: good    Judgment: good    Eyes: Pupils normal    Skin: No track marks noted ,no rashes noted    COWS score: N/A              URINE DRUG SCREEN TODAY:   0 Result Notes    Component 12/28/21 1651   Alcohol, Urine NEG    Amphetamine Screen, Urine NEG    Barbiturate Screen, Urine NEG    Benzodiazepine Screen, Urine NEG    Buprenorphine Urine POS    Cocaine Metabolite Screen, Urine NEG    FENTANYL SCREEN, URINE NEG    Gabapentin Screen, Urine N/A    MDMA, Urine NEG    Methadone Screen, Urine NEG    Methamphetamine, Urine NEG    Opiate Scrn, Ur NEG    Oxycodone Screen, Ur NEG    PCP Screen, Urine N/A    Propoxyphene Screen, Urine NEG    Synthetic Cannabinoids (K2) Screen, Urine NEG    THC Screen, Urine NEG    Tramadol Scrn, Ur NEG    Tricyclic Antidepressants, Urine N/A                Diagnosis Orders   1. Severe opioid use disorder (HCC)  POCT Rapid Drug Screen    buprenorphine-naloxone (SUBOXONE) 8-2 MG FILM SL film   2.  Encounter for monitoring Suboxone maintenance therapy           PLAN:  I increased her from 12 mg Suboxone to 16 mg Suboxone daily last visit  She says this helps with her urges and cravings    I discussed a new phone terri dealing with substance use disorder with the patient  It is called reSet  It is a 24/7 hour system that the patient can use at any time  There are lesson plans, tracking of treatment, rewards, etc  I told the patient's I will also have access to their account to track their progress  Patient will discuss with Emely Burk and sign up if interested  I reviewed the [place] Automated Rx Reporting System report     There does not appear to be any discrepancies or overprescribing of controlled substances  She would prefer to stay with me because crystals last appointment is 3:30 PM and I stay later  Follow-up 4 weeks

## 2022-01-25 ENCOUNTER — OFFICE VISIT (OUTPATIENT)
Dept: INTERNAL MEDICINE CLINIC | Age: 32
End: 2022-01-25
Payer: COMMERCIAL

## 2022-01-25 VITALS
BODY MASS INDEX: 20.97 KG/M2 | TEMPERATURE: 97.7 F | HEIGHT: 67 IN | SYSTOLIC BLOOD PRESSURE: 166 MMHG | DIASTOLIC BLOOD PRESSURE: 98 MMHG | HEART RATE: 106 BPM | WEIGHT: 133.6 LBS | RESPIRATION RATE: 20 BRPM

## 2022-01-25 DIAGNOSIS — F11.20 SEVERE OPIOID USE DISORDER (HCC): Primary | ICD-10-CM

## 2022-01-25 DIAGNOSIS — Z51.81 ENCOUNTER FOR MONITORING SUBOXONE MAINTENANCE THERAPY: ICD-10-CM

## 2022-01-25 DIAGNOSIS — Z79.899 ENCOUNTER FOR MONITORING SUBOXONE MAINTENANCE THERAPY: ICD-10-CM

## 2022-01-25 PROCEDURE — G8420 CALC BMI NORM PARAMETERS: HCPCS | Performed by: INTERNAL MEDICINE

## 2022-01-25 PROCEDURE — 4004F PT TOBACCO SCREEN RCVD TLK: CPT | Performed by: INTERNAL MEDICINE

## 2022-01-25 PROCEDURE — G8484 FLU IMMUNIZE NO ADMIN: HCPCS | Performed by: INTERNAL MEDICINE

## 2022-01-25 PROCEDURE — G8427 DOCREV CUR MEDS BY ELIG CLIN: HCPCS | Performed by: INTERNAL MEDICINE

## 2022-01-25 PROCEDURE — 99214 OFFICE O/P EST MOD 30 MIN: CPT | Performed by: INTERNAL MEDICINE

## 2022-01-25 RX ORDER — BUPRENORPHINE AND NALOXONE 8; 2 MG/1; MG/1
1 FILM, SOLUBLE BUCCAL; SUBLINGUAL 2 TIMES DAILY
Qty: 56 FILM | Refills: 0 | Status: SHIPPED | OUTPATIENT
Start: 2022-01-25 | End: 2022-02-21 | Stop reason: SDUPTHER

## 2022-01-25 NOTE — PROGRESS NOTES
Verbal order per Dr. Hough Persons for urine drug screen. Positive for BUP. Verified results with Genia Smith LPN. Dr. Hough Persons ordered Suboxone 8 mg films BID for patient. Verified dose with patient. Patient was sent home with script for Suboxone 8 mg films BID for 28 days and will be seen back in the office 2/22/2022.

## 2022-01-25 NOTE — PROGRESS NOTES
MEDICATION ASSISTED TREATMENT ENCOUNTER    HISTORY OF PRESENT ILLNESS  Patient presents for evaluation of opioid use disorder and wants to be placed on medication assisted treatment  Patient normally sees Do Yip but switch to me as I have later appointments which is easier on her schedule  Patient has had problems using pain pills  She had knee surgery about 3 and half years ago was prescribed pain pills  She started getting some from a friend after they ran out  Never went to heroin or fentanyl  Patient uses it po  He was at a Suboxone clinic in [place] then went to see Dr. Geo Whittaker  Now she sees Do Yip  She has been clean over 2 years  Past Medical History:   Diagnosis Date    MRSA (methicillin resistant staph aureus) culture positive        Past Surgical History:   Procedure Laterality Date    ANKLE SURGERY         PAST PSYCHIATRIC HISTORY: no    No Known Allergies    Current Outpatient Medications   Medication Sig Dispense Refill    buprenorphine-naloxone (SUBOXONE) 8-2 MG FILM SL film Place 1 Film under the tongue 2 times daily for 28 days. 56 Film 0    buPROPion (WELLBUTRIN SR) 200 MG extended release tablet Take 1 tablet by mouth 2 times daily 60 tablet 3    fluticasone (FLONASE) 50 MCG/ACT nasal spray 1 spray by Each Nostril route daily 1 Bottle 0    cloNIDine (CATAPRES) 0.1 MG tablet Take 1 tablet by mouth 2 times daily as needed for High Blood Pressure 60 tablet 3     No current facility-administered medications for this visit.          SOCIAL     Marital status      Children one daughter     Employment patient has an [address] [facility] [facility] [employer] parents     Legal issues no prison or care home     Tobacco: yes, cigarettes     Alcohol no                  ROS     General: Patient is feeling well  Patient is not experiencing  withdrawal symptoms ,no urges or cravings  Patient is not having any side effects from the buprenorphine    PHYSICAL EXAM    Blood pressure (!) 166/98, pulse 106, temperature 97.7 °F (36.5 °C), temperature source Tympanic, resp. rate 20, height 5' 7\" (1.702 m), weight 133 lb 9.6 oz (60.6 kg). Mental status examination    Cognition: oriented to person place and time      Appearance: Patient is in no acute distress, normal appearance, patient does not appear intoxicated/    Memory: Normal    Behavior/motor: Normal    Mood: Good mood, upbeat    Affect: Mood congruent    Attitude toward examiner: Pleasant, respectful    Thought content no delusions hallucinations suicidal ideation    Insight: good    Judgment: good    Eyes: Pupils normal    Skin: No track marks noted ,no rashes noted    COWS score: N/A              URINE DRUG SCREEN TODAY:      Component 12/28/21 1651   Alcohol, Urine NEG    Amphetamine Screen, Urine NEG    Barbiturate Screen, Urine NEG    Benzodiazepine Screen, Urine NEG    Buprenorphine Urine POS    Cocaine Metabolite Screen, Urine NEG    FENTANYL SCREEN, URINE NEG    Gabapentin Screen, Urine N/A    MDMA, Urine NEG    Methadone Screen, Urine NEG    Methamphetamine, Urine NEG    Opiate Scrn, Ur NEG    Oxycodone Screen, Ur NEG    PCP Screen, Urine N/A    Propoxyphene Screen, Urine NEG    Synthetic Cannabinoids (K2) Screen, Urine NEG    THC Screen, Urine NEG    Tramadol Scrn, Ur NEG    Tricyclic Antidepressants, Urine N/A                   Diagnosis Orders   1. Severe opioid use disorder (HCC)  buprenorphine-naloxone (SUBOXONE) 8-2 MG FILM SL film   2.  Encounter for monitoring Suboxone maintenance therapy           PLAN:  I increased her from 12 mg Suboxone to 16 mg Suboxone daily last visit  She says this helps with her urges and cravings  Urine send out from last visit showed nothing illicit  I discussed a new phone terri dealing with substance use disorder with the patient  It is called reSet  It is a 24/7 hour system that the patient can use at any time  There are lesson plans, tracking of treatment, rewards, etc  I told the patient's I will also have access to their account to track their progress  Patient will discuss with Gladys Yee and sign up if interested  I reviewed the [facility] Rx Reporting System report     There does not appear to be any discrepancies or overprescribing of controlled substances  She would prefer to stay with me because crystals last appointment is 3:30 PM and I stay later  Follow-up 4 weeks

## 2022-02-21 ENCOUNTER — OFFICE VISIT (OUTPATIENT)
Dept: INTERNAL MEDICINE CLINIC | Age: 32
End: 2022-02-21
Payer: COMMERCIAL

## 2022-02-21 VITALS
HEIGHT: 67 IN | HEART RATE: 106 BPM | TEMPERATURE: 98.6 F | SYSTOLIC BLOOD PRESSURE: 134 MMHG | BODY MASS INDEX: 20.78 KG/M2 | DIASTOLIC BLOOD PRESSURE: 84 MMHG | RESPIRATION RATE: 20 BRPM | WEIGHT: 132.4 LBS

## 2022-02-21 DIAGNOSIS — Z79.899 ENCOUNTER FOR MONITORING SUBOXONE MAINTENANCE THERAPY: ICD-10-CM

## 2022-02-21 DIAGNOSIS — Z51.81 ENCOUNTER FOR MONITORING SUBOXONE MAINTENANCE THERAPY: ICD-10-CM

## 2022-02-21 DIAGNOSIS — F11.20 SEVERE OPIOID USE DISORDER (HCC): Primary | ICD-10-CM

## 2022-02-21 PROCEDURE — 4004F PT TOBACCO SCREEN RCVD TLK: CPT | Performed by: INTERNAL MEDICINE

## 2022-02-21 PROCEDURE — G8427 DOCREV CUR MEDS BY ELIG CLIN: HCPCS | Performed by: INTERNAL MEDICINE

## 2022-02-21 PROCEDURE — 80305 DRUG TEST PRSMV DIR OPT OBS: CPT | Performed by: INTERNAL MEDICINE

## 2022-02-21 PROCEDURE — G8420 CALC BMI NORM PARAMETERS: HCPCS | Performed by: INTERNAL MEDICINE

## 2022-02-21 PROCEDURE — G8484 FLU IMMUNIZE NO ADMIN: HCPCS | Performed by: INTERNAL MEDICINE

## 2022-02-21 PROCEDURE — 99214 OFFICE O/P EST MOD 30 MIN: CPT | Performed by: INTERNAL MEDICINE

## 2022-02-21 RX ORDER — BUPRENORPHINE AND NALOXONE 8; 2 MG/1; MG/1
1 FILM, SOLUBLE BUCCAL; SUBLINGUAL 2 TIMES DAILY
Qty: 56 FILM | Refills: 0 | Status: SHIPPED | OUTPATIENT
Start: 2022-02-21 | End: 2022-03-21 | Stop reason: SDUPTHER

## 2022-02-21 NOTE — PROGRESS NOTES
MEDICATION ASSISTED TREATMENT ENCOUNTER    HISTORY OF PRESENT ILLNESS  Patient presents for evaluation of opioid use disorder and wants to be placed on medication assisted treatment  Patient normally sees Keron Magana but switch to me as I have later appointments which is easier on her schedule  I last saw her 1/25  Patient has had problems using pain pills  She had knee surgery about 3 and half years ago was prescribed pain pills  She started getting some from a friend after they ran out  Never went to heroin or fentanyl  Patient uses it po  He was at a Suboxone clinic in [facility] then went to see Dr. Sarah Terry    She has been clean over 2 years  Past Medical History:   Diagnosis Date    MRSA (methicillin resistant staph aureus) culture positive        Past Surgical History:   Procedure Laterality Date    ANKLE SURGERY         PAST PSYCHIATRIC HISTORY: no    No Known Allergies    Current Outpatient Medications   Medication Sig Dispense Refill    buprenorphine-naloxone (SUBOXONE) 8-2 MG FILM SL film Place 1 Film under the tongue 2 times daily for 28 days. 56 Film 0    buPROPion (WELLBUTRIN SR) 200 MG extended release tablet Take 1 tablet by mouth 2 times daily 60 tablet 3    fluticasone (FLONASE) 50 MCG/ACT nasal spray 1 spray by Each Nostril route daily 1 Bottle 0    cloNIDine (CATAPRES) 0.1 MG tablet Take 1 tablet by mouth 2 times daily as needed for High Blood Pressure 60 tablet 3     No current facility-administered medications for this visit.          SOCIAL     Marital status      Children one daughter     Employment patient has an [address] [facility]     Legal issues no prison or intermediate     Tobacco: yes, cigarettes     Alcohol no                  ROS     General: Patient is feeling well  Patient is not experiencing  withdrawal symptoms ,no urges or cravings  Patient is not having any side effects from the buprenorphine    PHYSICAL EXAM    Blood pressure 134/84, pulse 106, temperature 98.6 °F (37 °C), temperature source Tympanic, resp. rate 20, height 5' 7\" (1.702 m), weight 132 lb 6.4 oz (60.1 kg). Mental status examination    Cognition: oriented to person place and time      Appearance: Patient is in no acute distress, normal appearance, patient does not appear intoxicated/    Memory: Normal    Behavior/motor: Normal    Mood: Good mood, upbeat    Affect: Mood congruent    Attitude toward examiner: Pleasant, respectful    Thought content no delusions hallucinations suicidal ideation    Insight: good    Judgment: good    Eyes: Pupils normal    Skin: No track marks noted ,no rashes noted    COWS score: N/A              URINE DRUG SCREEN TODAY:      lcohol, Urine NEG    Amphetamine Screen, Urine NEG    Barbiturate Screen, Urine NEG    Benzodiazepine Screen, Urine NEG    Buprenorphine Urine POS    Cocaine Metabolite Screen, Urine NEG    FENTANYL SCREEN, URINE NEG    Gabapentin Screen, Urine N/A    MDMA, Urine NEG    Methadone Screen, Urine NEG    Methamphetamine, Urine NEG    Opiate Scrn, Ur NEG    Oxycodone Screen, Ur NEG    PCP Screen, Urine N/A    Propoxyphene Screen, Urine NEG    Synthetic Cannabinoids (K2) Screen, Urine NEG    THC Screen, Urine NEG    Tramadol Scrn, Ur NEG    Tricyclic Antidepressants, Urine N/A                   Diagnosis Orders   1. Severe opioid use disorder (HCC)  buprenorphine-naloxone (SUBOXONE) 8-2 MG FILM SL film    POCT Rapid Drug Screen   2.  Encounter for monitoring Suboxone maintenance therapy           PLAN:  I increased her from 12 mg Suboxone to 16 mg Suboxone daily last visit  She says this helps with her urges and cravings  Urine send out from last visit showed nothing illicit    I reviewed the [facility] Rx Reporting System report     There does not appear to be any discrepancies or overprescribing of controlled substances  She would prefer to stay with me because crystals last appointment is 3:30 PM and I stay later  Follow-up 4 weeks

## 2022-03-07 ENCOUNTER — CLINICAL DOCUMENTATION (OUTPATIENT)
Dept: INTERNAL MEDICINE CLINIC | Age: 32
End: 2022-03-07

## 2022-03-07 NOTE — PROGRESS NOTES
Sw was contacted by patient last week. Sw was able to update patient's terri so she is able to continue working through lessons. Sw attempted to contact patient with updated information. Sw was unable to reach patient or leave voicemail.

## 2022-03-10 ENCOUNTER — TELEMEDICINE (OUTPATIENT)
Dept: PSYCHIATRY | Age: 32
End: 2022-03-10
Payer: COMMERCIAL

## 2022-03-10 DIAGNOSIS — R41.840 ATTENTION DEFICIT: Primary | ICD-10-CM

## 2022-03-10 DIAGNOSIS — F41.9 ANXIETY: ICD-10-CM

## 2022-03-10 DIAGNOSIS — G47.00 INSOMNIA, UNSPECIFIED TYPE: ICD-10-CM

## 2022-03-10 PROCEDURE — 99214 OFFICE O/P EST MOD 30 MIN: CPT | Performed by: PSYCHIATRY & NEUROLOGY

## 2022-03-10 RX ORDER — CLONIDINE HYDROCHLORIDE 0.1 MG/1
0.1 TABLET ORAL NIGHTLY PRN
Qty: 30 TABLET | Refills: 1 | Status: SHIPPED | OUTPATIENT
Start: 2022-03-10 | End: 2022-05-18 | Stop reason: SDUPTHER

## 2022-03-10 RX ORDER — BUPROPION HYDROCHLORIDE 200 MG/1
200 TABLET, EXTENDED RELEASE ORAL 2 TIMES DAILY
Qty: 60 TABLET | Refills: 3 | Status: SHIPPED | OUTPATIENT
Start: 2022-03-10 | End: 2022-05-18 | Stop reason: SDUPTHER
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
## 2022-03-21 ENCOUNTER — HOSPITAL ENCOUNTER (OUTPATIENT)
Dept: GENERAL RADIOLOGY | Age: 32
Discharge: HOME | End: 2022-03-21
Payer: COMMERCIAL

## 2022-03-21 ENCOUNTER — OFFICE VISIT (OUTPATIENT)
Dept: INTERNAL MEDICINE CLINIC | Age: 32
End: 2022-03-21
Payer: COMMERCIAL

## 2022-03-21 ENCOUNTER — HOSPITAL ENCOUNTER (OUTPATIENT)
Age: 32
Discharge: HOME | End: 2022-03-21
Payer: COMMERCIAL

## 2022-03-21 VITALS
BODY MASS INDEX: 19.9 KG/M2 | TEMPERATURE: 98.5 F | HEIGHT: 67 IN | DIASTOLIC BLOOD PRESSURE: 82 MMHG | HEART RATE: 97 BPM | RESPIRATION RATE: 20 BRPM | SYSTOLIC BLOOD PRESSURE: 140 MMHG | WEIGHT: 126.8 LBS
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
## 2022-03-21 DIAGNOSIS — Z79.899 ENCOUNTER FOR MONITORING SUBOXONE MAINTENANCE THERAPY: ICD-10-CM

## 2022-03-21 DIAGNOSIS — Z51.81 ENCOUNTER FOR MONITORING SUBOXONE MAINTENANCE THERAPY: ICD-10-CM

## 2022-03-21 DIAGNOSIS — M54.2 NECK PAIN: ICD-10-CM

## 2022-03-21 DIAGNOSIS — F11.20 SEVERE OPIOID USE DISORDER (HCC): Primary | ICD-10-CM

## 2022-03-21 PROCEDURE — 4004F PT TOBACCO SCREEN RCVD TLK: CPT | Performed by: INTERNAL MEDICINE

## 2022-03-21 PROCEDURE — G8420 CALC BMI NORM PARAMETERS: HCPCS | Performed by: INTERNAL MEDICINE

## 2022-03-21 PROCEDURE — 99214 OFFICE O/P EST MOD 30 MIN: CPT | Performed by: INTERNAL MEDICINE

## 2022-03-21 PROCEDURE — G8484 FLU IMMUNIZE NO ADMIN: HCPCS | Performed by: INTERNAL MEDICINE

## 2022-03-21 PROCEDURE — G8427 DOCREV CUR MEDS BY ELIG CLIN: HCPCS | Performed by: INTERNAL MEDICINE

## 2022-03-21 PROCEDURE — 80305 DRUG TEST PRSMV DIR OPT OBS: CPT | Performed by: INTERNAL MEDICINE

## 2022-03-21 PROCEDURE — 72050 X-RAY EXAM NECK SPINE 4/5VWS: CPT

## 2022-03-21 RX ORDER — BUPRENORPHINE AND NALOXONE 8; 2 MG/1; MG/1
1 FILM, SOLUBLE BUCCAL; SUBLINGUAL 2 TIMES DAILY
Qty: 56 FILM | Refills: 0 | Status: SHIPPED | OUTPATIENT
Start: 2022-03-21 | End: 2022-04-18 | Stop reason: SDUPTHER
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
## 2022-03-21 NOTE — PROGRESS NOTES
Verbal order per Dr. Madelyn Varma for urine drug screen. Positive for BUP. Verified results with Keith Harrell RN. Dr. Madelyn Varma ordered Suboxone 8mg film BID  for patient. Verified dose with patient. Patient was sent home with 4 week script of Suboxone 8mg film BID and will be seen back in the office 4/18/22.

## 2022-04-18 ENCOUNTER — OFFICE VISIT (OUTPATIENT)
Dept: INTERNAL MEDICINE CLINIC | Age: 32
End: 2022-04-18
Payer: COMMERCIAL

## 2022-04-18 VITALS
HEIGHT: 67 IN | HEART RATE: 86 BPM | TEMPERATURE: 97.8 F | WEIGHT: 126 LBS | BODY MASS INDEX: 19.78 KG/M2 | SYSTOLIC BLOOD PRESSURE: 129 MMHG | DIASTOLIC BLOOD PRESSURE: 60 MMHG

## 2022-04-18 DIAGNOSIS — Z51.81 ENCOUNTER FOR MONITORING SUBOXONE MAINTENANCE THERAPY: ICD-10-CM

## 2022-04-18 DIAGNOSIS — F11.21 SEVERE OPIOID USE DISORDER, IN SUSTAINED REMISSION, ON MAINTENANCE THERAPY (HCC): Primary | ICD-10-CM

## 2022-04-18 DIAGNOSIS — Z79.899 ENCOUNTER FOR MONITORING SUBOXONE MAINTENANCE THERAPY: ICD-10-CM

## 2022-04-18 DIAGNOSIS — M54.2 NECK PAIN: ICD-10-CM

## 2022-04-18 PROCEDURE — 99214 OFFICE O/P EST MOD 30 MIN: CPT | Performed by: INTERNAL MEDICINE

## 2022-04-18 PROCEDURE — G8427 DOCREV CUR MEDS BY ELIG CLIN: HCPCS | Performed by: INTERNAL MEDICINE

## 2022-04-18 PROCEDURE — G8420 CALC BMI NORM PARAMETERS: HCPCS | Performed by: INTERNAL MEDICINE

## 2022-04-18 PROCEDURE — 4004F PT TOBACCO SCREEN RCVD TLK: CPT | Performed by: INTERNAL MEDICINE

## 2022-04-18 PROCEDURE — 80305 DRUG TEST PRSMV DIR OPT OBS: CPT | Performed by: INTERNAL MEDICINE

## 2022-04-18 RX ORDER — BUPRENORPHINE AND NALOXONE 8; 2 MG/1; MG/1
1 FILM, SOLUBLE BUCCAL; SUBLINGUAL 2 TIMES DAILY
Qty: 56 FILM | Refills: 0 | Status: SHIPPED | OUTPATIENT
Start: 2022-04-18 | End: 2022-05-17 | Stop reason: SDUPTHER
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
## 2022-04-18 NOTE — PROGRESS NOTES
Verbal order per Dr. Froylan Barney for urine drug screen. Positive for BUP. Verified results with Lisa Hanson LPN. Dr. Froylan Barney ordered Suboxone 8 mg film BID  for patient. Verified dose with patient. Patient was sent home with a script for Suboxone 8 mg films BID for 28 days and will be seen back in the office 05/16/2022.

## 2022-05-17 ENCOUNTER — OFFICE VISIT (OUTPATIENT)
Dept: INTERNAL MEDICINE CLINIC | Age: 32
End: 2022-05-17
Payer: COMMERCIAL

## 2022-05-17 VITALS
DIASTOLIC BLOOD PRESSURE: 89 MMHG | HEART RATE: 88 BPM | TEMPERATURE: 97.6 F | SYSTOLIC BLOOD PRESSURE: 135 MMHG | HEIGHT: 67 IN | BODY MASS INDEX: 20.25 KG/M2 | WEIGHT: 129 LBS

## 2022-05-17 DIAGNOSIS — R53.83 OTHER FATIGUE: ICD-10-CM

## 2022-05-17 DIAGNOSIS — M54.2 NECK PAIN: ICD-10-CM

## 2022-05-17 DIAGNOSIS — F11.21 SEVERE OPIOID USE DISORDER, IN SUSTAINED REMISSION, ON MAINTENANCE THERAPY (HCC): Primary | ICD-10-CM

## 2022-05-17 DIAGNOSIS — Z51.81 ENCOUNTER FOR MONITORING SUBOXONE MAINTENANCE THERAPY: ICD-10-CM

## 2022-05-17 DIAGNOSIS — Z79.899 ENCOUNTER FOR MONITORING SUBOXONE MAINTENANCE THERAPY: ICD-10-CM

## 2022-05-17 PROCEDURE — G8428 CUR MEDS NOT DOCUMENT: HCPCS | Performed by: INTERNAL MEDICINE

## 2022-05-17 PROCEDURE — 4004F PT TOBACCO SCREEN RCVD TLK: CPT | Performed by: INTERNAL MEDICINE

## 2022-05-17 PROCEDURE — 80305 DRUG TEST PRSMV DIR OPT OBS: CPT | Performed by: INTERNAL MEDICINE

## 2022-05-17 PROCEDURE — G8420 CALC BMI NORM PARAMETERS: HCPCS | Performed by: INTERNAL MEDICINE

## 2022-05-17 PROCEDURE — 99214 OFFICE O/P EST MOD 30 MIN: CPT | Performed by: INTERNAL MEDICINE

## 2022-05-17 RX ORDER — BUPRENORPHINE AND NALOXONE 8; 2 MG/1; MG/1
1 FILM, SOLUBLE BUCCAL; SUBLINGUAL 2 TIMES DAILY
Qty: 56 FILM | Refills: 0 | Status: SHIPPED | OUTPATIENT
Start: 2022-05-17 | End: 2022-06-14 | Stop reason: SDUPTHER

## 2022-05-17 NOTE — PROGRESS NOTES
Verbal order per Dr. Desmond Zimmerman for urine drug screen. Positive for BUP. Verified results with Bill Wood LPN. Dr. Desmond Zimmerman ordered Suboxone 8 mg film BID for patient. Verified dose with patient. Patient was sent home with a script for Suboxone 8 mg film BID for 28 days and will be seen back in the office 05/18/2022.
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
## 2022-05-18 ENCOUNTER — TELEMEDICINE (OUTPATIENT)
Dept: PSYCHIATRY | Age: 32
End: 2022-05-18
Payer: COMMERCIAL

## 2022-05-18 DIAGNOSIS — R41.840 ATTENTION DEFICIT: Primary | ICD-10-CM

## 2022-05-18 DIAGNOSIS — F41.9 ANXIETY: ICD-10-CM

## 2022-05-18 DIAGNOSIS — G47.00 INSOMNIA, UNSPECIFIED TYPE: ICD-10-CM

## 2022-05-18 PROCEDURE — G8427 DOCREV CUR MEDS BY ELIG CLIN: HCPCS | Performed by: PSYCHIATRY & NEUROLOGY

## 2022-05-18 PROCEDURE — 99215 OFFICE O/P EST HI 40 MIN: CPT | Performed by: PSYCHIATRY & NEUROLOGY

## 2022-05-18 RX ORDER — CLONIDINE HYDROCHLORIDE 0.1 MG/1
0.1 TABLET ORAL NIGHTLY PRN
Qty: 30 TABLET | Refills: 2 | Status: SHIPPED | OUTPATIENT
Start: 2022-05-18 | End: 2022-07-28 | Stop reason: SDUPTHER

## 2022-05-18 RX ORDER — BUPROPION HYDROCHLORIDE 200 MG/1
200 TABLET, EXTENDED RELEASE ORAL 2 TIMES DAILY
Qty: 60 TABLET | Refills: 2 | Status: SHIPPED | OUTPATIENT
Start: 2022-05-18 | End: 2022-07-28 | Stop reason: SDUPTHER
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
## 2022-06-14 ENCOUNTER — TELEMEDICINE (OUTPATIENT)
Dept: INTERNAL MEDICINE CLINIC | Age: 32
End: 2022-06-14
Payer: COMMERCIAL

## 2022-06-14 DIAGNOSIS — Z51.81 ENCOUNTER FOR MONITORING SUBOXONE MAINTENANCE THERAPY: ICD-10-CM

## 2022-06-14 DIAGNOSIS — F11.21 SEVERE OPIOID USE DISORDER, IN SUSTAINED REMISSION, ON MAINTENANCE THERAPY (HCC): Primary | ICD-10-CM

## 2022-06-14 DIAGNOSIS — Z79.899 ENCOUNTER FOR MONITORING SUBOXONE MAINTENANCE THERAPY: ICD-10-CM

## 2022-06-14 PROCEDURE — G8428 CUR MEDS NOT DOCUMENT: HCPCS | Performed by: INTERNAL MEDICINE

## 2022-06-14 PROCEDURE — 4004F PT TOBACCO SCREEN RCVD TLK: CPT | Performed by: INTERNAL MEDICINE

## 2022-06-14 PROCEDURE — G8420 CALC BMI NORM PARAMETERS: HCPCS | Performed by: INTERNAL MEDICINE

## 2022-06-14 PROCEDURE — 99213 OFFICE O/P EST LOW 20 MIN: CPT | Performed by: INTERNAL MEDICINE

## 2022-06-14 RX ORDER — BUPRENORPHINE AND NALOXONE 8; 2 MG/1; MG/1
1 FILM, SOLUBLE BUCCAL; SUBLINGUAL 2 TIMES DAILY
Qty: 56 FILM | Refills: 0 | Status: SHIPPED | OUTPATIENT
Start: 2022-06-14 | End: 2022-07-11 | Stop reason: SDUPTHER
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
## 2022-07-11 ENCOUNTER — OFFICE VISIT (OUTPATIENT)
Dept: INTERNAL MEDICINE CLINIC | Age: 32
End: 2022-07-11
Payer: COMMERCIAL

## 2022-07-11 VITALS
HEART RATE: 118 BPM | SYSTOLIC BLOOD PRESSURE: 114 MMHG | TEMPERATURE: 98.6 F | DIASTOLIC BLOOD PRESSURE: 68 MMHG | HEIGHT: 67 IN | BODY MASS INDEX: 20.2 KG/M2 | RESPIRATION RATE: 16 BRPM

## 2022-07-11 DIAGNOSIS — Z79.899 ENCOUNTER FOR MONITORING SUBOXONE MAINTENANCE THERAPY: ICD-10-CM

## 2022-07-11 DIAGNOSIS — Z51.81 ENCOUNTER FOR MONITORING SUBOXONE MAINTENANCE THERAPY: ICD-10-CM

## 2022-07-11 DIAGNOSIS — F11.21 SEVERE OPIOID USE DISORDER, IN SUSTAINED REMISSION, ON MAINTENANCE THERAPY (HCC): Primary | ICD-10-CM

## 2022-07-11 PROCEDURE — 4004F PT TOBACCO SCREEN RCVD TLK: CPT | Performed by: INTERNAL MEDICINE

## 2022-07-11 PROCEDURE — G8427 DOCREV CUR MEDS BY ELIG CLIN: HCPCS | Performed by: INTERNAL MEDICINE

## 2022-07-11 PROCEDURE — G8420 CALC BMI NORM PARAMETERS: HCPCS | Performed by: INTERNAL MEDICINE

## 2022-07-11 PROCEDURE — 99213 OFFICE O/P EST LOW 20 MIN: CPT | Performed by: INTERNAL MEDICINE

## 2022-07-11 RX ORDER — BUPRENORPHINE AND NALOXONE 8; 2 MG/1; MG/1
1 FILM, SOLUBLE BUCCAL; SUBLINGUAL 2 TIMES DAILY
Qty: 56 FILM | Refills: 0 | Status: SHIPPED | OUTPATIENT
Start: 2022-07-11 | End: 2022-08-08 | Stop reason: SDUPTHER

## 2022-07-11 NOTE — PROGRESS NOTES
Verbal order per Dr. Severiano Spence for urine drug screen. Positive for BUP. Verified results with Shaina Dale MEME. Dr. Severiano Spence ordered Suboxone 8 mg film BID for patient. Verified dose with patient. Patient was sent home with a script for Suboxone 8 mg film BID for 28 days and will be seen back in the office 08/08/2022.
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
## 2022-07-28 ENCOUNTER — TELEMEDICINE (OUTPATIENT)
Dept: PSYCHIATRY | Age: 32
End: 2022-07-28
Payer: COMMERCIAL

## 2022-07-28 DIAGNOSIS — R41.840 ATTENTION DEFICIT: Primary | ICD-10-CM

## 2022-07-28 DIAGNOSIS — G47.00 INSOMNIA, UNSPECIFIED TYPE: ICD-10-CM

## 2022-07-28 DIAGNOSIS — F41.9 ANXIETY: ICD-10-CM

## 2022-07-28 PROCEDURE — 99213 OFFICE O/P EST LOW 20 MIN: CPT | Performed by: PSYCHIATRY & NEUROLOGY

## 2022-07-28 PROCEDURE — G8427 DOCREV CUR MEDS BY ELIG CLIN: HCPCS | Performed by: PSYCHIATRY & NEUROLOGY

## 2022-07-28 PROCEDURE — 90833 PSYTX W PT W E/M 30 MIN: CPT | Performed by: PSYCHIATRY & NEUROLOGY

## 2022-07-28 RX ORDER — BUPROPION HYDROCHLORIDE 200 MG/1
200 TABLET, EXTENDED RELEASE ORAL 2 TIMES DAILY
Qty: 60 TABLET | Refills: 3 | Status: SHIPPED | OUTPATIENT
Start: 2022-07-28

## 2022-07-28 RX ORDER — CLONIDINE HYDROCHLORIDE 0.1 MG/1
0.1 TABLET ORAL NIGHTLY PRN
Qty: 30 TABLET | Refills: 3 | Status: SHIPPED | OUTPATIENT
Start: 2022-07-28

## 2022-07-28 NOTE — PROGRESS NOTES
[address] [address]  [phone]    Progress Note    Patient:  Reagan Driscoll  YOB: 1990  PCP:  JAQUELINE Poon CNP  Visit Date:  7/28/2022    TELEHEALTH EVALUATION -- Audio/Visual (During KVVRO-07 public health emergency)    Patient location: home  Physician location: [place], [facility]  This virtual visit was conducted via interactive, real-time video. Chief Complaint   Patient presents with    Follow-up    Medication Check    ADHD    Anxiety    Insomnia         SUBJECTIVE:    Time in: 4:11pm  Time out: 4:34pm  Time spent on documentation: 5 minutes    Reagan Driscoll, a 27 y.o. female, presents for a follow up visit. Patient reports she is doing well. Patient is compliant with medication regimen. She presents alone. She was 10 minutes late. Doing well overall. Having a good summer. Not much has changed since last visit. Her dad is doing better after his colostomy. He's more vocal. Making good effort for his therapies. Worries about the care he receives whether it be in hospital or nursing home. He has had more of a voice lately. Doing well with current meds. Taking clonidine nightly now and getting better sleep. Tried 0.05 mg dose and 0.1 mg felt more effective. Sleeps through the night. A little groggy in AM but manageable. Appetite is ok. Energy is ok, better with Wellbutrin. Wellbutrin has been helpful,feels happier and less worried over smaller things, but feels something is missing. Still some trouble with focus. We discussed ways to modify behavior. [employer] magazine and website that will have a lot of tips. She prefers to make no med changes until NPT results are in. Tried to call in to discuss NPT. Says she didn't get a call back. Wants to [facility] for NPT in [facility] or [facility].    Spent much of session in psychotherapy discussing current stressors, coping skills, strategies for change, supportive therapy, psycheducation. Med Trials:Paxil (disliked, \"zombie\", nausea)       OBJECTIVE:  Vitals: There were no vitals taken for this visit. MENTAL STATUS EXAM:    GENERAL  Build: Normal    Hygiene:  Appropriate in casual dress   SENSORIUM Orientation: Place, Person, Time, & Situation     Consciousness: Alert    ATTENTION   Focused   RELATEDNESS  Cooperative    EYE CONTACT   Good    PSYCHOMOTOR  Normal    SPEECH Volume: Normal     Rate: Normal rate and tone    Amplitude: Within normal limits   MOOD  Euthymic     AFFECT Range: Full, social smile present   THOUGHT Process:  Goal-Directed     Content: no evidence of psychosis    COGNITION Insight: Good    Judgement:  Intact    MEMORY  no gross deficits, did not test     INTELLIGENCE  Average     Mobility/Gait: Independently     Controlled SubstancesMonitoring:   not done      ASSESSMENT: No med changes per her request. Waiting on NPT to r/o ADHD. Wellbutrin improved mood, motivation, energy, and anxiety. Diagnosis Orders   1. Attention deficit        2. Anxiety        3. Insomnia, unspecified type        R/o ADHD, LESA      PLAN:     Medications: Wellbutrin  mg BID   Clonidine 0.1 mg QHS  Therapy: none, will continue to offer  Labs/Tests/Imaging: looking into NPT referral  Records Reviewed: [facility]  Patient advised to call if patient has any difficulties with treatment  Return in about 3 months (around 10/28/2022) for med check, follow up. I spent 23 minutes face to face with this patient. I spent 16 minutes or longer in psychotherapy discussing current stressors, coping skills, strategies for change, supportive therapy, psycheducation. Remainder of time spent on symptom evaluation and medication management. Janelle Pride, was evaluated through a synchronous (real-time) audio-video encounter.  The patient (or guardian if applicable) is aware that this is a billable service, which includes applicable copays. This virtual visit was conducted with patient's (and/or legal guardian's) consent. The visit was conducted pursuant to the emergency declaration under the [address] [place] [address] authority and the [employer] General Act. Patient identification was verified, and a caregiver was present when appropriate. The patient was located in a state where the provider was licensed to provide care.       Total time spent for this encounter: 28 minutes    Electronically signed by Christ Ganser, MD on 7/28/2022 at 5:28 PM

## 2022-08-08 ENCOUNTER — TELEMEDICINE (OUTPATIENT)
Dept: INTERNAL MEDICINE CLINIC | Age: 32
End: 2022-08-08
Payer: COMMERCIAL

## 2022-08-08 DIAGNOSIS — F11.21 SEVERE OPIOID USE DISORDER, IN SUSTAINED REMISSION, ON MAINTENANCE THERAPY (HCC): ICD-10-CM

## 2022-08-08 DIAGNOSIS — Z79.899 ENCOUNTER FOR MONITORING SUBOXONE MAINTENANCE THERAPY: ICD-10-CM

## 2022-08-08 DIAGNOSIS — F11.21 SEVERE OPIOID USE DISORDER, IN SUSTAINED REMISSION, ON MAINTENANCE THERAPY (HCC): Primary | ICD-10-CM

## 2022-08-08 DIAGNOSIS — Z51.81 ENCOUNTER FOR MONITORING SUBOXONE MAINTENANCE THERAPY: ICD-10-CM

## 2022-08-08 PROCEDURE — G8427 DOCREV CUR MEDS BY ELIG CLIN: HCPCS | Performed by: INTERNAL MEDICINE

## 2022-08-08 PROCEDURE — G8420 CALC BMI NORM PARAMETERS: HCPCS | Performed by: INTERNAL MEDICINE

## 2022-08-08 PROCEDURE — 99213 OFFICE O/P EST LOW 20 MIN: CPT | Performed by: INTERNAL MEDICINE

## 2022-08-08 PROCEDURE — 4004F PT TOBACCO SCREEN RCVD TLK: CPT | Performed by: INTERNAL MEDICINE

## 2022-08-08 RX ORDER — BUPRENORPHINE AND NALOXONE 8; 2 MG/1; MG/1
1 FILM, SOLUBLE BUCCAL; SUBLINGUAL 2 TIMES DAILY
Qty: 58 FILM | Refills: 0 | Status: SHIPPED | OUTPATIENT
Start: 2022-08-08 | End: 2022-09-07 | Stop reason: SDUPTHER
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
## 2022-08-09 RX ORDER — BUPRENORPHINE AND NALOXONE 8; 2 MG/1; MG/1
1 FILM, SOLUBLE BUCCAL; SUBLINGUAL 2 TIMES DAILY
Qty: 56 FILM | Refills: 0 | OUTPATIENT
Start: 2022-08-09 | End: 2022-09-06

## 2022-09-07 ENCOUNTER — OFFICE VISIT (OUTPATIENT)
Dept: INTERNAL MEDICINE CLINIC | Age: 32
End: 2022-09-07
Payer: COMMERCIAL

## 2022-09-07 VITALS
TEMPERATURE: 98.7 F | DIASTOLIC BLOOD PRESSURE: 94 MMHG | HEART RATE: 105 BPM | WEIGHT: 125.6 LBS | HEIGHT: 67 IN | RESPIRATION RATE: 20 BRPM | BODY MASS INDEX: 19.71 KG/M2 | SYSTOLIC BLOOD PRESSURE: 148 MMHG

## 2022-09-07 DIAGNOSIS — F11.21 SEVERE OPIOID USE DISORDER, IN SUSTAINED REMISSION, ON MAINTENANCE THERAPY (HCC): Primary | ICD-10-CM

## 2022-09-07 DIAGNOSIS — Z79.899 ENCOUNTER FOR MONITORING SUBOXONE MAINTENANCE THERAPY: ICD-10-CM

## 2022-09-07 DIAGNOSIS — Z51.81 ENCOUNTER FOR MONITORING SUBOXONE MAINTENANCE THERAPY: ICD-10-CM

## 2022-09-07 PROCEDURE — 99213 OFFICE O/P EST LOW 20 MIN: CPT | Performed by: INTERNAL MEDICINE

## 2022-09-07 PROCEDURE — G8427 DOCREV CUR MEDS BY ELIG CLIN: HCPCS | Performed by: INTERNAL MEDICINE

## 2022-09-07 PROCEDURE — 80305 DRUG TEST PRSMV DIR OPT OBS: CPT | Performed by: INTERNAL MEDICINE

## 2022-09-07 PROCEDURE — G8420 CALC BMI NORM PARAMETERS: HCPCS | Performed by: INTERNAL MEDICINE

## 2022-09-07 PROCEDURE — 4004F PT TOBACCO SCREEN RCVD TLK: CPT | Performed by: INTERNAL MEDICINE

## 2022-09-07 RX ORDER — BUPRENORPHINE AND NALOXONE 8; 2 MG/1; MG/1
1 FILM, SOLUBLE BUCCAL; SUBLINGUAL 2 TIMES DAILY
Qty: 52 FILM | Refills: 0 | Status: SHIPPED | OUTPATIENT
Start: 2022-09-07 | End: 2022-10-05 | Stop reason: SDUPTHER

## 2022-09-07 NOTE — PROGRESS NOTES
Verbal order per Dr. Phil Contreras for urine drug screen. Positive for BUP. Verified results with Olliejohn Morgan LPN. Dr. Phil Contreras ordered Suboxone 8 mg film BID for patient. Verified dose with patient. Patient was sent home with a script for Suboxone 8 mg film BID for 26 days and will be seen back in the office 10/05/2022.
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
## 2022-10-05 ENCOUNTER — TELEMEDICINE (OUTPATIENT)
Dept: INTERNAL MEDICINE CLINIC | Age: 32
End: 2022-10-05
Payer: COMMERCIAL

## 2022-10-05 DIAGNOSIS — Z79.899 ENCOUNTER FOR MONITORING SUBOXONE MAINTENANCE THERAPY: ICD-10-CM

## 2022-10-05 DIAGNOSIS — F11.21 SEVERE OPIOID DEPENDENCE IN SUSTAINED REMISSION (HCC): Primary | ICD-10-CM

## 2022-10-05 DIAGNOSIS — Z51.81 ENCOUNTER FOR MONITORING SUBOXONE MAINTENANCE THERAPY: ICD-10-CM

## 2022-10-05 PROCEDURE — G8428 CUR MEDS NOT DOCUMENT: HCPCS | Performed by: INTERNAL MEDICINE

## 2022-10-05 PROCEDURE — G8484 FLU IMMUNIZE NO ADMIN: HCPCS | Performed by: INTERNAL MEDICINE

## 2022-10-05 PROCEDURE — G8420 CALC BMI NORM PARAMETERS: HCPCS | Performed by: INTERNAL MEDICINE

## 2022-10-05 PROCEDURE — 4004F PT TOBACCO SCREEN RCVD TLK: CPT | Performed by: INTERNAL MEDICINE

## 2022-10-05 PROCEDURE — 99213 OFFICE O/P EST LOW 20 MIN: CPT | Performed by: INTERNAL MEDICINE

## 2022-10-05 RX ORDER — BUPRENORPHINE AND NALOXONE 8; 2 MG/1; MG/1
1 FILM, SOLUBLE BUCCAL; SUBLINGUAL 2 TIMES DAILY
Qty: 56 FILM | Refills: 0 | Status: SHIPPED | OUTPATIENT
Start: 2022-10-05 | End: 2022-11-03 | Stop reason: SDUPTHER
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
## 2022-11-03 ENCOUNTER — OFFICE VISIT (OUTPATIENT)
Dept: INTERNAL MEDICINE CLINIC | Age: 32
End: 2022-11-03
Payer: COMMERCIAL

## 2022-11-03 VITALS
TEMPERATURE: 98.2 F | SYSTOLIC BLOOD PRESSURE: 138 MMHG | BODY MASS INDEX: 20.4 KG/M2 | HEIGHT: 67 IN | RESPIRATION RATE: 16 BRPM | DIASTOLIC BLOOD PRESSURE: 79 MMHG | HEART RATE: 82 BPM | WEIGHT: 130 LBS

## 2022-11-03 DIAGNOSIS — F11.21 SEVERE OPIOID DEPENDENCE IN SUSTAINED REMISSION (HCC): Primary | ICD-10-CM

## 2022-11-03 DIAGNOSIS — Z79.899 ENCOUNTER FOR MONITORING SUBOXONE MAINTENANCE THERAPY: ICD-10-CM

## 2022-11-03 DIAGNOSIS — Z51.81 ENCOUNTER FOR MONITORING SUBOXONE MAINTENANCE THERAPY: ICD-10-CM

## 2022-11-03 PROCEDURE — G8420 CALC BMI NORM PARAMETERS: HCPCS | Performed by: INTERNAL MEDICINE

## 2022-11-03 PROCEDURE — G8484 FLU IMMUNIZE NO ADMIN: HCPCS | Performed by: INTERNAL MEDICINE

## 2022-11-03 PROCEDURE — G8428 CUR MEDS NOT DOCUMENT: HCPCS | Performed by: INTERNAL MEDICINE

## 2022-11-03 PROCEDURE — 80305 DRUG TEST PRSMV DIR OPT OBS: CPT | Performed by: INTERNAL MEDICINE

## 2022-11-03 PROCEDURE — 99214 OFFICE O/P EST MOD 30 MIN: CPT | Performed by: INTERNAL MEDICINE

## 2022-11-03 PROCEDURE — 4004F PT TOBACCO SCREEN RCVD TLK: CPT | Performed by: INTERNAL MEDICINE

## 2022-11-03 RX ORDER — CLONIDINE HYDROCHLORIDE 0.1 MG/1
0.1 TABLET ORAL DAILY
Qty: 30 TABLET | Refills: 3 | Status: SHIPPED | OUTPATIENT
Start: 2022-11-03 | End: 2022-11-08 | Stop reason: SDUPTHER

## 2022-11-03 RX ORDER — BUPRENORPHINE AND NALOXONE 8; 2 MG/1; MG/1
1 FILM, SOLUBLE BUCCAL; SUBLINGUAL 2 TIMES DAILY
Qty: 56 FILM | Refills: 0 | Status: SHIPPED | OUTPATIENT
Start: 2022-11-03 | End: 2022-12-01 | Stop reason: SDUPTHER

## 2022-11-03 NOTE — PROGRESS NOTES
Verbal order per Dr. Mariana Fierro for urine drug screen. Positive for BUP. Verified results with Jinny Byrd LPN. Dr. Mariana Fierro ordered Suboxone 8 mg film BID for patient. Verified dose with patient. Patient was sent home with a script for Suboxone 8 mg film BID for 28 days and will be seen back in the office 12/1/2022.
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
## 2022-11-08 DIAGNOSIS — F11.21 SEVERE OPIOID DEPENDENCE IN SUSTAINED REMISSION (HCC): Primary | ICD-10-CM

## 2022-11-08 RX ORDER — CLONIDINE HYDROCHLORIDE 0.1 MG/1
0.1 TABLET ORAL NIGHTLY PRN
Qty: 30 TABLET | Refills: 3 | Status: SHIPPED | OUTPATIENT
Start: 2022-11-08

## 2022-11-08 RX ORDER — BUPROPION HYDROCHLORIDE 200 MG/1
200 TABLET, EXTENDED RELEASE ORAL 2 TIMES DAILY
Qty: 60 TABLET | Refills: 3 | Status: SHIPPED | OUTPATIENT
Start: 2022-11-08

## 2022-11-08 RX ORDER — CLONIDINE HYDROCHLORIDE 0.1 MG/1
0.1 TABLET ORAL DAILY
Qty: 30 TABLET | Refills: 3 | Status: SHIPPED | OUTPATIENT
Start: 2022-11-08

## 2022-11-08 NOTE — TELEPHONE ENCOUNTER
Avery Mahan contacted office requesting refill on wellbutrin 200mg ext. release disp 60, clonidine 0.1 mg disp 30. Avery Mahan is scheduled for a future appt on 1/22.  Loaded pending approval.

## 2022-12-01 ENCOUNTER — TELEMEDICINE (OUTPATIENT)
Dept: INTERNAL MEDICINE CLINIC | Age: 32
End: 2022-12-01
Payer: COMMERCIAL

## 2022-12-01 DIAGNOSIS — F11.21 SEVERE OPIOID DEPENDENCE IN SUSTAINED REMISSION (HCC): ICD-10-CM

## 2022-12-01 PROCEDURE — G8420 CALC BMI NORM PARAMETERS: HCPCS | Performed by: NURSE PRACTITIONER

## 2022-12-01 PROCEDURE — G8484 FLU IMMUNIZE NO ADMIN: HCPCS | Performed by: NURSE PRACTITIONER

## 2022-12-01 PROCEDURE — 99212 OFFICE O/P EST SF 10 MIN: CPT | Performed by: NURSE PRACTITIONER

## 2022-12-01 PROCEDURE — 4004F PT TOBACCO SCREEN RCVD TLK: CPT | Performed by: NURSE PRACTITIONER

## 2022-12-01 PROCEDURE — G8428 CUR MEDS NOT DOCUMENT: HCPCS | Performed by: NURSE PRACTITIONER

## 2022-12-01 RX ORDER — BUPRENORPHINE AND NALOXONE 8; 2 MG/1; MG/1
1 FILM, SOLUBLE BUCCAL; SUBLINGUAL 2 TIMES DAILY
Qty: 56 FILM | Refills: 0 | Status: SHIPPED | OUTPATIENT
Start: 2022-12-01 | End: 2022-12-29

## 2022-12-01 NOTE — PROGRESS NOTES
Cruzito Vaughn (:  1990) is a Established patient, here for evaluation of the following:severe opioid use disorder    Assessment & Plan   Below is the assessment and plan developed based on review of pertinent history, physical exam, labs, studies, and medications. 1. Severe opioid dependence in sustained remission (HCC)  The following orders have not been finalized:  -     buprenorphine-naloxone (SUBOXONE) 8-2 MG FILM SL film         Patient has had problems using pain pills  She had knee surgery about 3 and half years ago was prescribed pain pills  She started getting some from a friend after they ran out  Never went to heroin or fentanyl  Patient uses it po  He was at a Suboxone clinic in [place] then went to see Dr. Odalis Pittman, was evaluated through a synchronous (real-time) audio-video encounter. The patient (or guardian if applicable) is aware that this is a billable service, which includes applicable co-pays. This Virtual Visit was conducted with patient's (and/or legal guardian's) consent. The visit was conducted pursuant to the emergency declaration under the [facility], [address] [facility] waiver authority and the [employer] General Act. Patient identification was verified, and a caregiver was present when appropriate. The patient was located at Home: [place] [id number] [facility]. Provider was located at [facility] (Appt Dept): [address] [facility]  [address] [facility],  [address].         --Jennifer Carpio, JAQUELINE - CNP

## 2023-01-03 ENCOUNTER — OFFICE VISIT (OUTPATIENT)
Dept: INTERNAL MEDICINE CLINIC | Age: 33
End: 2023-01-03
Payer: COMMERCIAL

## 2023-01-03 VITALS
BODY MASS INDEX: 20.2 KG/M2 | DIASTOLIC BLOOD PRESSURE: 84 MMHG | HEART RATE: 87 BPM | SYSTOLIC BLOOD PRESSURE: 128 MMHG | RESPIRATION RATE: 16 BRPM | WEIGHT: 129 LBS

## 2023-01-03 DIAGNOSIS — F11.21 SEVERE OPIOID DEPENDENCE IN SUSTAINED REMISSION (HCC): Primary | ICD-10-CM

## 2023-01-03 PROCEDURE — G8420 CALC BMI NORM PARAMETERS: HCPCS | Performed by: NURSE PRACTITIONER

## 2023-01-03 PROCEDURE — 4004F PT TOBACCO SCREEN RCVD TLK: CPT | Performed by: NURSE PRACTITIONER

## 2023-01-03 PROCEDURE — G8484 FLU IMMUNIZE NO ADMIN: HCPCS | Performed by: NURSE PRACTITIONER

## 2023-01-03 PROCEDURE — 99213 OFFICE O/P EST LOW 20 MIN: CPT | Performed by: NURSE PRACTITIONER

## 2023-01-03 PROCEDURE — 80305 DRUG TEST PRSMV DIR OPT OBS: CPT | Performed by: NURSE PRACTITIONER

## 2023-01-03 PROCEDURE — G8428 CUR MEDS NOT DOCUMENT: HCPCS | Performed by: NURSE PRACTITIONER

## 2023-01-03 RX ORDER — BUPRENORPHINE AND NALOXONE 8; 2 MG/1; MG/1
1 FILM, SOLUBLE BUCCAL; SUBLINGUAL 2 TIMES DAILY
Qty: 56 FILM | Refills: 0 | Status: SHIPPED | OUTPATIENT
Start: 2023-01-03 | End: 2023-01-31

## 2023-01-03 NOTE — PROGRESS NOTES
[facility] PROFESSIONAL [facility]'S INTERNAL MEDICINE AND MEDICATION MANAGEMENT  [address]  [address]  [place] [id number]  Dept: [phone]  Dept Fax: [phone]  Loc: [phone]     Visit Date:  1/3/2023    Patient:  Marilynn Anne  YOB: 1990    HPI:     Chief Complaint   Patient presents with    Drug Problem     Marilynn presents today for medical evaluation of severe opioid use disorder.     Previous MAT patient of Dr. Cobb. Last seen 4 weeks ago.     Urges and cravings better controlled with Suboxone 8 mg BID.      Urine positive for buprenorphine only      She denies any use     Is active in counseling     Hep C negative    Medications    Current Outpatient Medications:     buprenorphine-naloxone (SUBOXONE) 8-2 MG FILM SL film, Place 1 Film under the tongue 2 times daily for 28 days., Disp: 56 Film, Rfl: 0    buPROPion (WELLBUTRIN SR) 200 MG extended release tablet, Take 1 tablet by mouth 2 times daily, Disp: 60 tablet, Rfl: 3    cloNIDine (CATAPRES) 0.1 MG tablet, Take 1 tablet by mouth daily, Disp: 30 tablet, Rfl: 3    fluticasone (FLONASE) 50 MCG/ACT nasal spray, 1 spray by Each Nostril route daily, Disp: 1 Bottle, Rfl: 0    cloNIDine (CATAPRES) 0.1 MG tablet, Take 1 tablet by mouth nightly as needed for Other (insomnia), Disp: 30 tablet, Rfl: 3    The patient has No Known Allergies.    Past Medical History  Marilynn  has a past medical history of MRSA (methicillin resistant staph aureus) culture positive.    Past Surgical History  The patient  has a past surgical history that includes Ankle surgery.    Family History  This patient's family history includes Cancer in her paternal grandmother; Emphysema in her maternal grandmother; Heart Attack in her paternal grandfather; Seizures in her father.    Social History  Marilynn  reports that she has been smoking cigarettes. She has a 0.25 pack-year smoking history. She has never used smokeless tobacco. She reports  that she does not currently use alcohol. She reports that she does not currently use drugs. Health Maintenance:    Health Maintenance   Topic Date Due    COVID-19 Vaccine (1) Never done    Varicella vaccine (1 of 2 - 2-dose childhood series) Never done    Pneumococcal 0-64 years Vaccine (1 - PCV) Never done    Depression Screen  Never done    DTaP/Tdap/Td vaccine (1 - Tdap) Never done    Cervical cancer screen  Never done    Flu vaccine (1) Never done    Hepatitis C screen  Completed    HIV screen  Completed    Hepatitis A vaccine  Aged Out    Hib vaccine  Aged Out    Meningococcal (ACWY) vaccine  Aged Out       Subjective:      Review of Systems   Constitutional:  Negative for chills. Gastrointestinal:  Negative for nausea. Psychiatric/Behavioral:  Negative for sleep disturbance. All other systems reviewed and are negative. Objective:     /84 (Site: Right Lower Arm, Position: Sitting, Cuff Size: Medium Adult)   Pulse 87   Resp 16   Wt 129 lb (58.5 kg)   BMI 20.20 kg/m²     Physical Exam  Vitals reviewed. Constitutional:       General: She is not in acute distress. Appearance: Normal appearance. She is not ill-appearing. HENT:      Head: Normocephalic and atraumatic. Right Ear: External ear normal.      Left Ear: External ear normal.      Nose: Nose normal. No congestion or rhinorrhea. Mouth/Throat:      Mouth: Mucous membranes are moist.   Eyes:      Extraocular Movements: Extraocular movements intact. Conjunctiva/sclera: Conjunctivae normal.      Pupils: Pupils are equal, round, and reactive to light. Pulmonary:      Effort: Pulmonary effort is normal. No respiratory distress. Musculoskeletal:         General: Normal range of motion. Cervical back: Normal range of motion and neck supple. Right lower leg: No edema. Left lower leg: No edema. Skin:     General: Skin is warm and dry. Neurological:      General: No focal deficit present.       Mental Status: She is alert and oriented to person, place, and time. Psychiatric:         Mood and Affect: Mood normal.         Behavior: Behavior normal.         Thought Content: Thought content normal.         Judgment: Judgment normal.       Labs Reviewed 1/3/2023:    No results found for: WBC, HGB, HCT, PLT, CHOL, TRIG, HDL, LDLDIRECT, ALT, AST, NA, K, CL, CREATININE, BUN, CO2, TSH, PSA, INR, GLUF, LABA1C, LABMICR    Assessment/Plan      1. Severe opioid dependence in sustained remission (HCC)    - POCT Rapid Drug Screen  - buprenorphine-naloxone (SUBOXONE) 8-2 MG FILM SL film; Place 1 Film under the tongue 2 times daily for 28 days. Dispense: 56 Film; Refill: 0  - OARRS reviewed, no discrepancies  - Narcan at home  - Continue counseling as scheduled      Return in about 4 weeks (around 1/31/2023). Patient given educational materials - see patient instructions. Discussed use, benefit, and side effects of prescribed medications. All patient questions answered. Pt voiced understanding. Reviewed health maintenance.        Electronically signed JAQUELINE Carey - CNP on 1/3/23 at 4:10 PM EST

## 2023-01-17 ASSESSMENT — ENCOUNTER SYMPTOMS: NAUSEA: 0

## 2023-01-31 ENCOUNTER — TELEMEDICINE (OUTPATIENT)
Dept: INTERNAL MEDICINE CLINIC | Age: 33
End: 2023-01-31
Payer: COMMERCIAL

## 2023-01-31 DIAGNOSIS — F11.21 SEVERE OPIOID DEPENDENCE IN SUSTAINED REMISSION (HCC): ICD-10-CM

## 2023-01-31 PROCEDURE — 4004F PT TOBACCO SCREEN RCVD TLK: CPT | Performed by: NURSE PRACTITIONER

## 2023-01-31 PROCEDURE — 99214 OFFICE O/P EST MOD 30 MIN: CPT | Performed by: NURSE PRACTITIONER

## 2023-01-31 PROCEDURE — G8420 CALC BMI NORM PARAMETERS: HCPCS | Performed by: NURSE PRACTITIONER

## 2023-01-31 PROCEDURE — G8428 CUR MEDS NOT DOCUMENT: HCPCS | Performed by: NURSE PRACTITIONER

## 2023-01-31 PROCEDURE — G8484 FLU IMMUNIZE NO ADMIN: HCPCS | Performed by: NURSE PRACTITIONER

## 2023-01-31 RX ORDER — CLONIDINE HYDROCHLORIDE 0.1 MG/1
0.1 TABLET ORAL NIGHTLY
Qty: 30 TABLET | Refills: 3 | Status: SHIPPED | OUTPATIENT
Start: 2023-01-31

## 2023-01-31 RX ORDER — BUPRENORPHINE AND NALOXONE 8; 2 MG/1; MG/1
1 FILM, SOLUBLE BUCCAL; SUBLINGUAL 2 TIMES DAILY
Qty: 56 FILM | Refills: 0 | Status: SHIPPED | OUTPATIENT
Start: 2023-01-31 | End: 2023-02-28

## 2023-01-31 NOTE — PROGRESS NOTES
Angeles Ngo (:  1990) is a Established patient, here for evaluation of the following: Severe Opioid Use Disorder    Assessment & Plan   Below is the assessment and plan developed based on review of pertinent history, physical exam, labs, studies, and medications. 1. Severe opioid dependence in sustained remission (HCC)  -     buprenorphine-naloxone (SUBOXONE) 8-2 MG FILM SL film; Place 1 Film under the tongue 2 times daily for 28 days. , Disp-56 Film, R-0Normal  - OARRS reviewed, no discrepancies  - Narcan at home  - Continue counseling as scheduled    Return in about 4 weeks (around 2023), or at 340 pm.       Subjective     I last seen 4 weeks ago. Urges and cravings controlled with Suboxone 8 mg BID. She denies any use     Is active in counseling     Hep C negative    Review of Systems   Constitutional:  Negative for chills. Gastrointestinal:  Negative for nausea. Psychiatric/Behavioral:  Negative for sleep disturbance. All other systems reviewed and are negative.        Objective   Patient-Reported Vitals  No data recorded     Physical Exam  [INSTRUCTIONS:  \"[x]\" Indicates a positive item  \"[]\" Indicates a negative item  -- DELETE ALL ITEMS NOT EXAMINED]    Constitutional: [x] Appears well-developed and well-nourished [x] No apparent distress      [] Abnormal -     Mental status: [x] Alert and awake  [x] Oriented to person/place/time [x] Able to follow commands    [] Abnormal -     Eyes:   EOM    [x]  Normal    [] Abnormal -   Sclera  [x]  Normal    [] Abnormal -          Discharge [x]  None visible   [] Abnormal -     HENT: [x] Normocephalic, atraumatic  [] Abnormal -   [x] Mouth/Throat: Mucous membranes are moist    External Ears [x] Normal  [] Abnormal -    Neck: [x] No visualized mass [] Abnormal -     Pulmonary/Chest: [x] Respiratory effort normal   [x] No visualized signs of difficulty breathing or respiratory distress        [] Abnormal -      Musculoskeletal:   [x] Normal gait with no signs of ataxia         [x] Normal range of motion of neck        [] Abnormal -     Neurological:        [x] No Facial Asymmetry (Cranial nerve 7 motor function) (limited exam due to video visit)          [x] No gaze palsy        [] Abnormal -          Skin:        [x] No significant exanthematous lesions or discoloration noted on facial skin         [] Abnormal -            Psychiatric:       [x] Normal Affect [] Abnormal -        [x] No Hallucinations    Other pertinent observable physical exam findings:- none         [employer], was evaluated through a synchronous (real-time) audio-video encounter. The patient (or guardian if applicable) is aware that this is a billable service, which includes applicable co-pays. This Virtual Visit was conducted with patient's (and/or legal guardian's) consent. The visit was conducted pursuant to the emergency declaration under the [address] [place] [address], [address] [place] [address] authority and the [employer] General Act. Patient identification was verified, and a caregiver was present when appropriate.    The patient was located at Home: [place] [id number] [facility]  Provider was located at [facility] (Appt Dept): [address] [facility] SONALI  OLGA .ANTONIO,  [address], APRN - CNP

## 2023-02-16 ASSESSMENT — ENCOUNTER SYMPTOMS: NAUSEA: 0

## 2023-02-27 DIAGNOSIS — F11.21 SEVERE OPIOID DEPENDENCE IN SUSTAINED REMISSION (HCC): ICD-10-CM

## 2023-02-27 RX ORDER — BUPRENORPHINE AND NALOXONE 8; 2 MG/1; MG/1
1 FILM, SOLUBLE BUCCAL; SUBLINGUAL 2 TIMES DAILY
Qty: 20 FILM | Refills: 0 | Status: SHIPPED | OUTPATIENT
Start: 2023-02-27 | End: 2023-03-09

## 2023-02-27 NOTE — TELEPHONE ENCOUNTER
Last visit- 1/31/2023  Next visit- 3/9/2023    Requested Prescriptions     Pending Prescriptions Disp Refills    buprenorphine-naloxone (SUBOXONE) 8-2 MG FILM SL film 20 Film 0     Sig: Place 1 Film under the tongue 2 times daily for 10 days.  Max Daily Amount: 2 Film

## 2023-03-09 ENCOUNTER — OFFICE VISIT (OUTPATIENT)
Dept: INTERNAL MEDICINE CLINIC | Age: 33
End: 2023-03-09
Payer: COMMERCIAL

## 2023-03-09 VITALS
HEART RATE: 98 BPM | DIASTOLIC BLOOD PRESSURE: 87 MMHG | WEIGHT: 135 LBS | BODY MASS INDEX: 21.69 KG/M2 | SYSTOLIC BLOOD PRESSURE: 131 MMHG | HEIGHT: 66 IN

## 2023-03-09 DIAGNOSIS — F11.20 SEVERE OPIOID DEPENDENCE ON MAINTENANCE THERAPY (HCC): ICD-10-CM

## 2023-03-09 DIAGNOSIS — F11.21 SEVERE OPIOID DEPENDENCE IN SUSTAINED REMISSION (HCC): Primary | ICD-10-CM

## 2023-03-09 PROCEDURE — G8427 DOCREV CUR MEDS BY ELIG CLIN: HCPCS | Performed by: NURSE PRACTITIONER

## 2023-03-09 PROCEDURE — 4004F PT TOBACCO SCREEN RCVD TLK: CPT | Performed by: NURSE PRACTITIONER

## 2023-03-09 PROCEDURE — G8484 FLU IMMUNIZE NO ADMIN: HCPCS | Performed by: NURSE PRACTITIONER

## 2023-03-09 PROCEDURE — G8420 CALC BMI NORM PARAMETERS: HCPCS | Performed by: NURSE PRACTITIONER

## 2023-03-09 PROCEDURE — 99214 OFFICE O/P EST MOD 30 MIN: CPT | Performed by: NURSE PRACTITIONER

## 2023-03-09 PROCEDURE — 80305 DRUG TEST PRSMV DIR OPT OBS: CPT | Performed by: NURSE PRACTITIONER

## 2023-03-09 RX ORDER — BUPRENORPHINE AND NALOXONE 8; 2 MG/1; MG/1
1 FILM, SOLUBLE BUCCAL; SUBLINGUAL 2 TIMES DAILY
Qty: 56 FILM | Refills: 0 | Status: SHIPPED | OUTPATIENT
Start: 2023-03-09 | End: 2023-04-06

## 2023-03-09 ASSESSMENT — ENCOUNTER SYMPTOMS: NAUSEA: 0

## 2023-03-09 NOTE — PROGRESS NOTES
[facility] PROFESSIONAL [facility]'S INTERNAL MEDICINE AND MEDICATION MANAGEMENT  [address]  [address]  [place] [id number]  Dept: [phone]  Dept Fax: [phone]  Loc: [phone]     Visit Date:  3/9/2023    Patient:  Marilynn Anne  YOB: 1990    HPI:     Chief Complaint   Patient presents with    Drug Problem     Marilynn presents today for medical evaluation of severe opioid use disorder.     Previous MAT patient of Dr. Cobb. Last seen 4 weeks ago.     Urges and cravings better controlled with Suboxone 8 mg BID.      Urine positive for buprenorphine only      She denies any use     Is active in counseling     Hep C negative    Medications    Current Outpatient Medications:     buprenorphine-naloxone (SUBOXONE) 8-2 MG FILM SL film, Place 1 Film under the tongue 2 times daily for 28 days. Max Daily Amount: 2 Film, Disp: 56 Film, Rfl: 0    cloNIDine (CATAPRES) 0.1 MG tablet, Take 1 tablet by mouth nightly, Disp: 30 tablet, Rfl: 3    buPROPion (WELLBUTRIN SR) 200 MG extended release tablet, Take 1 tablet by mouth 2 times daily, Disp: 60 tablet, Rfl: 3    fluticasone (FLONASE) 50 MCG/ACT nasal spray, 1 spray by Each Nostril route daily, Disp: 1 Bottle, Rfl: 0    The patient has No Known Allergies.    Past Medical History  Marilynn  has a past medical history of MRSA (methicillin resistant staph aureus) culture positive.    Past Surgical History  The patient  has a past surgical history that includes Ankle surgery.    Family History  This patient's family history includes Cancer in her paternal grandmother; Emphysema in her maternal grandmother; Heart Attack in her paternal grandfather; Seizures in her father.    Social History  Marilynn  reports that she has been smoking cigarettes. She has a 0.25 pack-year smoking history. She has never used smokeless tobacco. She reports that she does not currently use alcohol. She reports that she does not currently use  drugs.    Health Maintenance:    Health Maintenance   Topic Date Due    Depression Screen  Never done    Cervical cancer screen  Never done    Varicella vaccine (1 of 2 - 2-dose childhood series) 03/30/2023 (Originally 5/15/1989)    Pneumococcal 0-64 years Vaccine (1 - PCV) 12/27/2023 (Originally 5/15/1994)    COVID-19 Vaccine (1) 12/28/2023 (Originally 1990)    DTaP/Tdap/Td vaccine (1 - Tdap) 03/09/2024 (Originally 5/15/2007)    Flu vaccine (1) 03/09/2024 (Originally 8/1/2022)    Hepatitis C screen  Completed    HIV screen  Completed    Hepatitis A vaccine  Aged Out    Hib vaccine  Aged Out    Meningococcal (ACWY) vaccine  Aged Out       Subjective:      Review of Systems   Constitutional:  Negative for chills. Gastrointestinal:  Negative for nausea. Psychiatric/Behavioral:  Negative for sleep disturbance. All other systems reviewed and are negative. Objective:     /87 (Site: Right Lower Arm, Position: Sitting, Cuff Size: Medium Adult)   Pulse 98   Ht 5' 6\" (1.676 m)   Wt 135 lb (61.2 kg)   BMI 21.79 kg/m²     Physical Exam  Vitals reviewed. Constitutional:       General: She is not in acute distress. Appearance: Normal appearance. She is not ill-appearing. HENT:      Head: Normocephalic and atraumatic. Right Ear: External ear normal.      Left Ear: External ear normal.      Nose: Nose normal. No congestion or rhinorrhea. Mouth/Throat:      Mouth: Mucous membranes are moist.   Eyes:      Extraocular Movements: Extraocular movements intact. Conjunctiva/sclera: Conjunctivae normal.      Pupils: Pupils are equal, round, and reactive to light. Pulmonary:      Effort: Pulmonary effort is normal. No respiratory distress. Musculoskeletal:         General: Normal range of motion. Cervical back: Normal range of motion and neck supple. Right lower leg: No edema. Left lower leg: No edema. Skin:     General: Skin is warm and dry.    Neurological: General: No focal deficit present. Mental Status: She is alert and oriented to person, place, and time. Psychiatric:         Mood and Affect: Mood normal.         Behavior: Behavior normal.         Thought Content: Thought content normal.         Judgment: Judgment normal.       Labs Reviewed 3/9/2023:    No results found for: WBC, HGB, HCT, PLT, CHOL, TRIG, HDL, LDLDIRECT, ALT, AST, NA, K, CL, CREATININE, BUN, CO2, TSH, PSA, INR, GLUF, LABA1C, LABMICR    Assessment/Plan      1. Severe opioid dependence in sustained remission (HCC)  *  - POCT Rapid Drug Screen  - buprenorphine-naloxone (SUBOXONE) 8-2 MG FILM SL film; Place 1 Film under the tongue 2 times daily for 28 days. Max Daily Amount: 2 Film  Dispense: 56 Film; Refill: 0    2. Severe opioid dependence on maintenance therapy ([facility] [address]    - OARRS reviewed, no discrepancies  - Narcan at home  - Continue counseling as scheduled      Return in about 4 weeks (around 4/6/2023). Patient given educational materials - see patient instructions. Discussed use, benefit, and side effects of prescribed medications. All patient questions answered. Pt voiced understanding. Reviewed health maintenance.        Electronically signed JAQUELINE Berkowitz - CNP on 3/9/23 at 4:29 PM EST

## 2023-04-06 ENCOUNTER — TELEMEDICINE (OUTPATIENT)
Dept: INTERNAL MEDICINE CLINIC | Age: 33
End: 2023-04-06
Payer: COMMERCIAL

## 2023-04-06 DIAGNOSIS — F11.21 SEVERE OPIOID DEPENDENCE IN SUSTAINED REMISSION (HCC): ICD-10-CM

## 2023-04-06 PROCEDURE — 99214 OFFICE O/P EST MOD 30 MIN: CPT | Performed by: NURSE PRACTITIONER

## 2023-04-06 PROCEDURE — G8420 CALC BMI NORM PARAMETERS: HCPCS | Performed by: NURSE PRACTITIONER

## 2023-04-06 PROCEDURE — G8428 CUR MEDS NOT DOCUMENT: HCPCS | Performed by: NURSE PRACTITIONER

## 2023-04-06 PROCEDURE — 4004F PT TOBACCO SCREEN RCVD TLK: CPT | Performed by: NURSE PRACTITIONER

## 2023-04-06 RX ORDER — BUPRENORPHINE AND NALOXONE 8; 2 MG/1; MG/1
1 FILM, SOLUBLE BUCCAL; SUBLINGUAL 2 TIMES DAILY
Qty: 56 FILM | Refills: 0 | Status: SHIPPED | OUTPATIENT
Start: 2023-04-06 | End: 2023-05-04

## 2023-04-06 RX ORDER — CLONIDINE HYDROCHLORIDE 0.1 MG/1
0.1 TABLET ORAL NIGHTLY
Qty: 30 TABLET | Refills: 3 | Status: CANCELLED | OUTPATIENT
Start: 2023-04-06

## 2023-04-06 RX ORDER — KETOCONAZOLE 20 MG/ML
SHAMPOO TOPICAL
Qty: 120 ML | Refills: 1 | Status: SHIPPED | OUTPATIENT
Start: 2023-04-06

## 2023-04-06 ASSESSMENT — ENCOUNTER SYMPTOMS: NAUSEA: 0

## 2023-05-04 ENCOUNTER — OFFICE VISIT (OUTPATIENT)
Dept: INTERNAL MEDICINE CLINIC | Age: 33
End: 2023-05-04
Payer: COMMERCIAL

## 2023-05-04 VITALS
DIASTOLIC BLOOD PRESSURE: 56 MMHG | HEIGHT: 66 IN | BODY MASS INDEX: 21.69 KG/M2 | HEART RATE: 84 BPM | RESPIRATION RATE: 18 BRPM | SYSTOLIC BLOOD PRESSURE: 111 MMHG | WEIGHT: 135 LBS

## 2023-05-04 DIAGNOSIS — F11.21 SEVERE OPIOID DEPENDENCE IN SUSTAINED REMISSION (HCC): Primary | ICD-10-CM

## 2023-05-04 PROCEDURE — G8427 DOCREV CUR MEDS BY ELIG CLIN: HCPCS | Performed by: NURSE PRACTITIONER

## 2023-05-04 PROCEDURE — 99211 OFF/OP EST MAY X REQ PHY/QHP: CPT | Performed by: NURSE PRACTITIONER

## 2023-05-04 PROCEDURE — 80305 DRUG TEST PRSMV DIR OPT OBS: CPT | Performed by: NURSE PRACTITIONER

## 2023-05-04 PROCEDURE — G8420 CALC BMI NORM PARAMETERS: HCPCS | Performed by: NURSE PRACTITIONER

## 2023-05-04 RX ORDER — CLONIDINE HYDROCHLORIDE 0.1 MG/1
0.1 TABLET ORAL NIGHTLY
Qty: 30 TABLET | Refills: 3 | Status: SHIPPED | OUTPATIENT
Start: 2023-05-04 | End: 2023-05-08 | Stop reason: SDUPTHER

## 2023-05-04 RX ORDER — BUPRENORPHINE AND NALOXONE 8; 2 MG/1; MG/1
1 FILM, SOLUBLE BUCCAL; SUBLINGUAL 2 TIMES DAILY
Qty: 56 FILM | Refills: 0 | Status: SHIPPED | OUTPATIENT
Start: 2023-05-04 | End: 2023-06-01

## 2023-05-04 RX ORDER — KETOCONAZOLE 20 MG/ML
SHAMPOO TOPICAL
Qty: 120 ML | Refills: 2 | Status: SHIPPED | OUTPATIENT
Start: 2023-05-04 | End: 2023-05-08 | Stop reason: SDUPTHER

## 2023-05-08 ENCOUNTER — TELEPHONE (OUTPATIENT)
Dept: INTERNAL MEDICINE CLINIC | Age: 33
End: 2023-05-08

## 2023-05-08 RX ORDER — CLONIDINE HYDROCHLORIDE 0.1 MG/1
0.1 TABLET ORAL NIGHTLY
Qty: 30 TABLET | Refills: 3 | Status: SHIPPED | OUTPATIENT
Start: 2023-05-08

## 2023-05-08 RX ORDER — KETOCONAZOLE 20 MG/ML
SHAMPOO TOPICAL
Qty: 120 ML | Refills: 2 | Status: SHIPPED | OUTPATIENT
Start: 2023-05-08

## 2023-05-08 RX ORDER — BUPROPION HYDROCHLORIDE 200 MG/1
200 TABLET, EXTENDED RELEASE ORAL 2 TIMES DAILY
Qty: 60 TABLET | Refills: 1 | Status: SHIPPED | OUTPATIENT
Start: 2023-05-08

## 2023-05-08 NOTE — TELEPHONE ENCOUNTER
Patient called in needed a refill of her Bupropion  mg #60 with 1 refill with a start date of 11/08/23. Last visit 07/28/22  Next visit 06/13/23    Pending your approval for a 30 day supply with one refills.

## 2023-06-01 ENCOUNTER — TELEMEDICINE (OUTPATIENT)
Dept: INTERNAL MEDICINE CLINIC | Age: 33
End: 2023-06-01
Payer: COMMERCIAL

## 2023-06-01 DIAGNOSIS — F11.21 SEVERE OPIOID DEPENDENCE IN SUSTAINED REMISSION (HCC): ICD-10-CM

## 2023-06-01 PROCEDURE — 99214 OFFICE O/P EST MOD 30 MIN: CPT | Performed by: NURSE PRACTITIONER

## 2023-06-01 PROCEDURE — G8428 CUR MEDS NOT DOCUMENT: HCPCS | Performed by: NURSE PRACTITIONER

## 2023-06-01 RX ORDER — BUPRENORPHINE AND NALOXONE 8; 2 MG/1; MG/1
1 FILM, SOLUBLE BUCCAL; SUBLINGUAL 2 TIMES DAILY
Qty: 56 FILM | Refills: 0 | Status: SHIPPED | OUTPATIENT
Start: 2023-06-01 | End: 2023-06-29 | Stop reason: SDUPTHER

## 2023-06-01 ASSESSMENT — ENCOUNTER SYMPTOMS: NAUSEA: 0

## 2023-06-13 ENCOUNTER — TELEMEDICINE (OUTPATIENT)
Dept: PSYCHIATRY | Age: 33
End: 2023-06-13
Payer: COMMERCIAL

## 2023-06-13 DIAGNOSIS — F41.9 ANXIETY: Primary | ICD-10-CM

## 2023-06-13 DIAGNOSIS — G47.00 INSOMNIA, UNSPECIFIED TYPE: ICD-10-CM

## 2023-06-13 DIAGNOSIS — R41.840 ATTENTION DEFICIT: ICD-10-CM

## 2023-06-13 PROCEDURE — G8427 DOCREV CUR MEDS BY ELIG CLIN: HCPCS | Performed by: PSYCHIATRY & NEUROLOGY

## 2023-06-13 PROCEDURE — 99215 OFFICE O/P EST HI 40 MIN: CPT | Performed by: PSYCHIATRY & NEUROLOGY

## 2023-06-13 RX ORDER — BUPROPION HYDROCHLORIDE 200 MG/1
200 TABLET, EXTENDED RELEASE ORAL 2 TIMES DAILY
Qty: 60 TABLET | Refills: 5 | Status: SHIPPED | OUTPATIENT
Start: 2023-06-13

## 2023-06-29 ENCOUNTER — OFFICE VISIT (OUTPATIENT)
Dept: INTERNAL MEDICINE CLINIC | Age: 33
End: 2023-06-29
Payer: COMMERCIAL

## 2023-06-29 VITALS
BODY MASS INDEX: 20.57 KG/M2 | WEIGHT: 128 LBS | HEART RATE: 92 BPM | SYSTOLIC BLOOD PRESSURE: 110 MMHG | DIASTOLIC BLOOD PRESSURE: 79 MMHG | HEIGHT: 66 IN | RESPIRATION RATE: 16 BRPM

## 2023-06-29 DIAGNOSIS — F11.21 SEVERE OPIOID DEPENDENCE IN SUSTAINED REMISSION (HCC): Primary | ICD-10-CM

## 2023-06-29 PROCEDURE — 4004F PT TOBACCO SCREEN RCVD TLK: CPT | Performed by: NURSE PRACTITIONER

## 2023-06-29 PROCEDURE — 80305 DRUG TEST PRSMV DIR OPT OBS: CPT | Performed by: NURSE PRACTITIONER

## 2023-06-29 PROCEDURE — 99213 OFFICE O/P EST LOW 20 MIN: CPT | Performed by: NURSE PRACTITIONER

## 2023-06-29 PROCEDURE — G8420 CALC BMI NORM PARAMETERS: HCPCS | Performed by: NURSE PRACTITIONER

## 2023-06-29 PROCEDURE — G8427 DOCREV CUR MEDS BY ELIG CLIN: HCPCS | Performed by: NURSE PRACTITIONER

## 2023-06-29 RX ORDER — BUPRENORPHINE AND NALOXONE 8; 2 MG/1; MG/1
1 FILM, SOLUBLE BUCCAL; SUBLINGUAL 2 TIMES DAILY
Qty: 56 FILM | Refills: 0 | Status: SHIPPED | OUTPATIENT
Start: 2023-06-29 | End: 2023-07-27

## 2023-06-29 ASSESSMENT — ENCOUNTER SYMPTOMS: NAUSEA: 0

## 2023-07-27 ENCOUNTER — TELEMEDICINE (OUTPATIENT)
Dept: INTERNAL MEDICINE CLINIC | Age: 33
End: 2023-07-27
Payer: COMMERCIAL

## 2023-07-27 DIAGNOSIS — F11.21 SEVERE OPIOID DEPENDENCE IN SUSTAINED REMISSION (HCC): ICD-10-CM

## 2023-07-27 PROCEDURE — G8420 CALC BMI NORM PARAMETERS: HCPCS | Performed by: NURSE PRACTITIONER

## 2023-07-27 PROCEDURE — 4004F PT TOBACCO SCREEN RCVD TLK: CPT | Performed by: NURSE PRACTITIONER

## 2023-07-27 PROCEDURE — G8428 CUR MEDS NOT DOCUMENT: HCPCS | Performed by: NURSE PRACTITIONER

## 2023-07-27 PROCEDURE — 99214 OFFICE O/P EST MOD 30 MIN: CPT | Performed by: NURSE PRACTITIONER

## 2023-07-27 RX ORDER — BUPRENORPHINE AND NALOXONE 8; 2 MG/1; MG/1
1 FILM, SOLUBLE BUCCAL; SUBLINGUAL 2 TIMES DAILY
Qty: 56 FILM | Refills: 0 | Status: SHIPPED | OUTPATIENT
Start: 2023-07-27 | End: 2023-08-24

## 2023-07-27 ASSESSMENT — ENCOUNTER SYMPTOMS: NAUSEA: 0

## 2023-07-27 NOTE — PROGRESS NOTES
Caryle Gowda (:  1990) is a Established patient, presenting virtually for evaluation of the following: Severe Opioid Use Disorder    Assessment & Plan   Below is the assessment and plan developed based on review of pertinent history, physical exam, labs, studies, and medications. 1. Severe opioid dependence in sustained remission ([address])  The following orders have not been finalized:  -     buprenorphine-naloxone (SUBOXONE) 8-2 MG FILM SL film    No follow-ups on file. Subjective     Previous MAT patient of Dr. Joellen Chadwick. I last seen her 4 weeks ago. Urges and cravings better controlled with Suboxone 8 mg BID. She denies any use     Is active in counseling     Hep C negative    Review of Systems   Constitutional:  Negative for chills. Gastrointestinal:  Negative for nausea. Psychiatric/Behavioral:  Negative for sleep disturbance. All other systems reviewed and are negative.        Objective   Patient-Reported Vitals  No data recorded     Physical Exam  [INSTRUCTIONS:  \"[x]\" Indicates a positive item  \"[]\" Indicates a negative item  -- DELETE ALL ITEMS NOT EXAMINED]    Constitutional: [x] Appears well-developed and well-nourished [x] No apparent distress      [] Abnormal -     Mental status: [x] Alert and awake  [x] Oriented to person/place/time [x] Able to follow commands    [] Abnormal -     Eyes:   EOM    [x]  Normal    [] Abnormal -   Sclera  [x]  Normal    [] Abnormal -          Discharge [x]  None visible   [] Abnormal -     HENT: [x] Normocephalic, atraumatic  [] Abnormal -   [x] Mouth/Throat: Mucous membranes are moist    External Ears [x] Normal  [] Abnormal -    Neck: [x] No visualized mass [] Abnormal -     Pulmonary/Chest: [x] Respiratory effort normal   [x] No visualized signs of difficulty breathing or respiratory distress        [] Abnormal -      Musculoskeletal:   [x] Normal gait with no signs of ataxia         [x] Normal range of motion of neck        [] Abnormal -

## 2023-08-01 ENCOUNTER — TELEPHONE (OUTPATIENT)
Dept: INTERNAL MEDICINE CLINIC | Age: 33
End: 2023-08-01

## 2023-08-01 RX ORDER — KETOCONAZOLE 20 MG/ML
SHAMPOO TOPICAL
Qty: 120 ML | Refills: 2 | Status: SHIPPED | OUTPATIENT
Start: 2023-08-01

## 2023-08-24 ENCOUNTER — NURSE ONLY (OUTPATIENT)
Dept: INTERNAL MEDICINE CLINIC | Age: 33
End: 2023-08-24
Payer: COMMERCIAL

## 2023-08-24 VITALS
DIASTOLIC BLOOD PRESSURE: 71 MMHG | HEART RATE: 75 BPM | WEIGHT: 122 LBS | HEIGHT: 66 IN | SYSTOLIC BLOOD PRESSURE: 120 MMHG | RESPIRATION RATE: 18 BRPM | BODY MASS INDEX: 19.61 KG/M2

## 2023-08-24 DIAGNOSIS — F11.21 SEVERE OPIOID DEPENDENCE IN SUSTAINED REMISSION (HCC): ICD-10-CM

## 2023-08-24 PROCEDURE — 80305 DRUG TEST PRSMV DIR OPT OBS: CPT | Performed by: NURSE PRACTITIONER

## 2023-08-24 RX ORDER — BUPRENORPHINE AND NALOXONE 8; 2 MG/1; MG/1
1 FILM, SOLUBLE BUCCAL; SUBLINGUAL 2 TIMES DAILY
Qty: 56 FILM | Refills: 0 | OUTPATIENT
Start: 2023-08-24 | End: 2023-09-21

## 2023-08-24 ASSESSMENT — PATIENT HEALTH QUESTIONNAIRE - PHQ9
SUM OF ALL RESPONSES TO PHQ QUESTIONS 1-9: 0
1. LITTLE INTEREST OR PLEASURE IN DOING THINGS: 0
SUM OF ALL RESPONSES TO PHQ9 QUESTIONS 1 & 2: 0
SUM OF ALL RESPONSES TO PHQ QUESTIONS 1-9: 0
2. FEELING DOWN, DEPRESSED OR HOPELESS: 0

## 2023-08-24 NOTE — PROGRESS NOTES
Verbal order per [facility] for urine drug screen. Positive for BUP. Verified results with Ekta Neely LPN.

## 2023-08-24 NOTE — PROGRESS NOTES
Patient completed updated PHQ-9 depression screening. Patient score:2  Patient acknowledges trouble sleeping and trouble concentrating. Sw updated in chart and paper copy was scanned in chart.

## 2023-08-24 NOTE — PROGRESS NOTES
Verbal per Thien Marshall, CNP this RN called in the patient's script to [facility] in [place], [place]. Suboxone 8-2mg BID for 28 days.  Qty 56, R-0.

## 2023-08-29 RX ORDER — CLONIDINE HYDROCHLORIDE 0.1 MG/1
0.1 TABLET ORAL NIGHTLY
Qty: 30 TABLET | Refills: 3 | Status: SHIPPED | OUTPATIENT
Start: 2023-08-29

## 2023-08-30 RX ORDER — CLONIDINE HYDROCHLORIDE 0.1 MG/1
0.1 TABLET ORAL NIGHTLY
Qty: 30 TABLET | Refills: 3 | OUTPATIENT
Start: 2023-08-30

## 2023-10-19 ENCOUNTER — OFFICE VISIT (OUTPATIENT)
Dept: INTERNAL MEDICINE CLINIC | Age: 33
End: 2023-10-19
Payer: COMMERCIAL

## 2023-10-19 VITALS
HEIGHT: 66 IN | DIASTOLIC BLOOD PRESSURE: 68 MMHG | BODY MASS INDEX: 19.29 KG/M2 | WEIGHT: 120 LBS | SYSTOLIC BLOOD PRESSURE: 129 MMHG | HEART RATE: 91 BPM

## 2023-10-19 DIAGNOSIS — F11.20 SEVERE OPIOID DEPENDENCE ON MAINTENANCE THERAPY (HCC): Primary | ICD-10-CM

## 2023-10-19 DIAGNOSIS — F11.21 SEVERE OPIOID DEPENDENCE IN SUSTAINED REMISSION (HCC): ICD-10-CM

## 2023-10-19 PROCEDURE — 4004F PT TOBACCO SCREEN RCVD TLK: CPT | Performed by: NURSE PRACTITIONER

## 2023-10-19 PROCEDURE — G8484 FLU IMMUNIZE NO ADMIN: HCPCS | Performed by: NURSE PRACTITIONER

## 2023-10-19 PROCEDURE — G8420 CALC BMI NORM PARAMETERS: HCPCS | Performed by: NURSE PRACTITIONER

## 2023-10-19 PROCEDURE — 80305 DRUG TEST PRSMV DIR OPT OBS: CPT | Performed by: NURSE PRACTITIONER

## 2023-10-19 PROCEDURE — 99214 OFFICE O/P EST MOD 30 MIN: CPT | Performed by: NURSE PRACTITIONER

## 2023-10-19 PROCEDURE — G8427 DOCREV CUR MEDS BY ELIG CLIN: HCPCS | Performed by: NURSE PRACTITIONER

## 2023-10-19 RX ORDER — BUPRENORPHINE AND NALOXONE 8; 2 MG/1; MG/1
1 FILM, SOLUBLE BUCCAL; SUBLINGUAL 2 TIMES DAILY
Qty: 56 FILM | Refills: 0 | Status: SHIPPED | OUTPATIENT
Start: 2023-10-19 | End: 2023-11-16

## 2023-10-19 NOTE — PROGRESS NOTES
[address] INTERNAL MEDICINE AND MEDICATION MANAGEMENT  [address] [facility]  [address] [facility] [address]  Dept: [phone]  Dept Fax: [phone]: [phone]     Visit Date:  10/19/2023    Patient:  Donna Raman  YOB: 1990    HPI:     Chief Complaint   Patient presents with    Drug Problem     Patricia Santos presents today for medical evaluation of severe opioid use disorder. Previous MAT patient of Dr. Kacey Harris. Last seen 4 weeks ago. Urges and cravings better controlled with Suboxone 8 mg BID. She denies any use     Is active in counseling     Hep C negative    Medications    Current Outpatient Medications:     buprenorphine-naloxone (SUBOXONE) 8-2 MG FILM SL film, Place 1 Film under the tongue 2 times daily for 28 days. Max Daily Amount: 2 Film, Disp: 56 Film, Rfl: 0    fluticasone (FLONASE) 50 MCG/ACT nasal spray, 1 spray by Each Nostril route daily, Disp: 1 Bottle, Rfl: 0    ketoconazole (NIZORAL) 2 % shampoo, Apply topically daily as needed. , Disp: 120 mL, Rfl: 2    cloNIDine (CATAPRES) 0.1 MG tablet, Take 1 tablet by mouth nightly, Disp: 30 tablet, Rfl: 2    buPROPion (WELLBUTRIN SR) 200 MG extended release tablet, Take 1 tablet by mouth 2 times daily, Disp: 60 tablet, Rfl: 2    amphetamine-dextroamphetamine (ADDERALL XR) 10 MG extended release capsule, Take 1 capsule by mouth every morning for 30 days. Max Daily Amount: 10 mg, Disp: 30 capsule, Rfl: 0    The patient has No Known Allergies. Past Medical History  Patricia Santos  has a past medical history of MRSA (methicillin resistant staph aureus) culture positive. Past Surgical History  The patient  has a past surgical history that includes Ankle surgery. Family History  This patient's family history includes Cancer in her paternal grandmother; Emphysema in her maternal grandmother; Heart Attack in her paternal grandfather; Seizures in her father.     Social History  Patricia Santos

## 2023-10-25 ENCOUNTER — TELEMEDICINE (OUTPATIENT)
Dept: PSYCHIATRY | Age: 33
End: 2023-10-25
Payer: COMMERCIAL

## 2023-10-25 DIAGNOSIS — F90.2 ADHD (ATTENTION DEFICIT HYPERACTIVITY DISORDER), COMBINED TYPE: Primary | ICD-10-CM

## 2023-10-25 DIAGNOSIS — F41.9 ANXIETY: ICD-10-CM

## 2023-10-25 DIAGNOSIS — G47.00 INSOMNIA, UNSPECIFIED TYPE: ICD-10-CM

## 2023-10-25 PROCEDURE — 90833 PSYTX W PT W E/M 30 MIN: CPT | Performed by: PSYCHIATRY & NEUROLOGY

## 2023-10-25 PROCEDURE — 99214 OFFICE O/P EST MOD 30 MIN: CPT | Performed by: PSYCHIATRY & NEUROLOGY

## 2023-10-25 PROCEDURE — G8427 DOCREV CUR MEDS BY ELIG CLIN: HCPCS | Performed by: PSYCHIATRY & NEUROLOGY

## 2023-10-25 RX ORDER — DEXTROAMPHETAMINE SACCHARATE, AMPHETAMINE ASPARTATE MONOHYDRATE, DEXTROAMPHETAMINE SULFATE AND AMPHETAMINE SULFATE 2.5; 2.5; 2.5; 2.5 MG/1; MG/1; MG/1; MG/1
10 CAPSULE, EXTENDED RELEASE ORAL EVERY MORNING
Qty: 30 CAPSULE | Refills: 0 | Status: SHIPPED | OUTPATIENT
Start: 2023-10-25 | End: 2023-11-24

## 2023-10-25 RX ORDER — CLONIDINE HYDROCHLORIDE 0.1 MG/1
0.1 TABLET ORAL NIGHTLY
Qty: 30 TABLET | Refills: 2 | Status: SHIPPED | OUTPATIENT
Start: 2023-10-25

## 2023-10-25 RX ORDER — BUPROPION HYDROCHLORIDE 200 MG/1
200 TABLET, EXTENDED RELEASE ORAL 2 TIMES DAILY
Qty: 60 TABLET | Refills: 2 | Status: SHIPPED | OUTPATIENT
Start: 2023-10-25

## 2023-10-25 NOTE — PROGRESS NOTES
[address] [facility] PSYCHIATRY  [facility] [address] [place]  [place] [id number] [phone]    Progress Note    Patient:  Winston Ortiz  YOB: 1990  PCP:  JAQUELINE Mitchell CNP  Visit Date:  10/25/2023      Chief Complaint   Patient presents with    Follow-up    Medication Check    ADHD    Anxiety    Insomnia       SUBJECTIVE:    Time in: 4:09pm  Time out: 4:45pm  Time spent on documentation: 5 minutes    Winston Ortiz, a 26 y.o. female, presents for a follow up visit. She presents alone. NPT results show ADHD, combined type. Notes the testing was lengthy. Good grades in school. Never got into trouble. Had to work hard and reread things. 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 Notes her dad's calming way, support. Her mom is more anxious. Tends to put others first. Finally prioritizing herself. We discussed how her helping herself benefits those around her. Her sister got into trouble a lot noting her mom would be yelling at her. Notes negative association the way her mom would talk about her sister having ADHD. Felt her ADHD was more noticeable after having her dtr. Notes her mom talks a lot. Has ADHD tendencies and has taken Provigil and Vyvanse. Notes the stress of dealing with her mom who she discusses at length. Notes Wellbutrin helps mood which is \"fine\". Still struggles to focus and get things done. Waking up a lot through the night. Skips Wellbutrin doses at times, doesn't bother her much. Cut out caffeine. Uses 0.05 mg clonidine most nights. Discussed tx options and we agree to start Adderall XR for ADHD. Spent much of session in psychotherapy discussing current stressors, coping skills, strategies for change, supportive therapy, psycheducation. Med Trials:Paxil (disliked, \"zombie\", nausea)       OBJECTIVE:  Vitals:  There were no vitals

## 2023-10-26 RX ORDER — KETOCONAZOLE 20 MG/ML
SHAMPOO TOPICAL
Qty: 120 ML | Refills: 2 | Status: SHIPPED | OUTPATIENT
Start: 2023-10-26

## 2023-10-29 ASSESSMENT — ENCOUNTER SYMPTOMS: NAUSEA: 0

## 2023-10-30 ENCOUNTER — TELEPHONE (OUTPATIENT)
Dept: PSYCHIATRY | Age: 33
End: 2023-10-30

## 2023-10-31 NOTE — TELEPHONE ENCOUNTER
I recommend she increase her dose to 20 mg once in the morning. She can use the 10 mg tablets that she has and give us an update by end of this week or early next week.    Electronically signed by Juan Pablo Mccallum MD on 10/31/2023 at 2:58 PM

## 2023-10-31 NOTE — TELEPHONE ENCOUNTER
Does the 10 mg dose feel like it's helping her focus the first half of the day or does the 10 mg dose feel too low altogether?    Electronically signed by Aura Kyle MD on 10/31/2023 at 6:57 AM

## 2023-11-01 NOTE — TELEPHONE ENCOUNTER
Eponym message sent to the patient; Ashleigh Navarrete wrote back the followin [facility] I will try that and will update you end of this week. Thank you.

## 2023-11-06 DIAGNOSIS — F90.2 ATTENTION DEFICIT HYPERACTIVITY DISORDER, COMBINED TYPE: Primary | ICD-10-CM

## 2023-11-08 DIAGNOSIS — F90.2 ADHD (ATTENTION DEFICIT HYPERACTIVITY DISORDER), COMBINED TYPE: ICD-10-CM

## 2023-11-08 RX ORDER — DEXTROAMPHETAMINE SACCHARATE, AMPHETAMINE ASPARTATE, DEXTROAMPHETAMINE SULFATE AND AMPHETAMINE SULFATE 2.5; 2.5; 2.5; 2.5 MG/1; MG/1; MG/1; MG/1
10 TABLET ORAL DAILY
Qty: 30 TABLET | Refills: 0 | Status: SHIPPED | OUTPATIENT
Start: 2023-11-08 | End: 2023-12-08

## 2023-11-08 RX ORDER — DEXTROAMPHETAMINE SACCHARATE, AMPHETAMINE ASPARTATE MONOHYDRATE, DEXTROAMPHETAMINE SULFATE AND AMPHETAMINE SULFATE 5; 5; 5; 5 MG/1; MG/1; MG/1; MG/1
20 CAPSULE, EXTENDED RELEASE ORAL EVERY MORNING
Qty: 30 CAPSULE | Refills: 0 | Status: SHIPPED | OUTPATIENT
Start: 2023-11-08 | End: 2023-12-08

## 2023-11-08 NOTE — TELEPHONE ENCOUNTER
I have wrote back to [facility] with this information via [employer]; awaiting response.

## 2023-11-08 NOTE — TELEPHONE ENCOUNTER
Ariana Joyner called stating she has approximately five days remaining of the extended release 10 mg and requests Rx to be sent for Adderall XR 20 mg for the morning (previously advised on 10/31 to take two 10 mg XR in the morning). She is aware Rx for 10 mg tablet in the afternoon has already been sent to the pharmacy. Requested Prescriptions     Pending Prescriptions Disp Refills    amphetamine-dextroamphetamine (ADDERALL XR) 20 MG extended release capsule 30 capsule 0     Sig: Take 1 capsule by mouth every morning for 30 days.  Max Daily Amount: 20 mg       Date of last visit: 10/25/2023  Date of next visit (if applicable):12/21/2023  Pharmacy Name: [facility],  [place], [place]

## 2023-11-08 NOTE — TELEPHONE ENCOUNTER
Dennis De La Fuente wrote back into the office via [employer]:    Bj Buys Dr. Ramana Guerra,   Yes, that sounds fine. Thanks,   Paulette Commander     *Medication for Adderall IR 10mg has been loaded for a 30 day supply with 0 refills with a note to be taken in the afternoon. Please advise otherwise.

## 2023-11-08 NOTE — TELEPHONE ENCOUNTER
I will typically add on a dose of Adderall IR later in the afternoon when the morning dose has worn off. She can continue Adderall XR 20 mg in morning. Would she like to try Adderall IR 10 mg in afternoon?    Electronically signed by Kendal Talavera MD on 11/8/2023 at 7:41 AM

## 2023-11-15 SDOH — ECONOMIC STABILITY: TRANSPORTATION INSECURITY
IN THE PAST 12 MONTHS, HAS LACK OF TRANSPORTATION KEPT YOU FROM MEETINGS, WORK, OR FROM GETTING THINGS NEEDED FOR DAILY LIVING?: NO

## 2023-11-15 SDOH — ECONOMIC STABILITY: HOUSING INSECURITY
IN THE LAST 12 MONTHS, WAS THERE A TIME WHEN YOU DID NOT HAVE A STEADY PLACE TO SLEEP OR SLEPT IN A SHELTER (INCLUDING NOW)?: NO

## 2023-11-15 SDOH — ECONOMIC STABILITY: FOOD INSECURITY: WITHIN THE PAST 12 MONTHS, THE FOOD YOU BOUGHT JUST DIDN'T LAST AND YOU DIDN'T HAVE MONEY TO GET MORE.: SOMETIMES TRUE

## 2023-11-15 SDOH — ECONOMIC STABILITY: INCOME INSECURITY: HOW HARD IS IT FOR YOU TO PAY FOR THE VERY BASICS LIKE FOOD, HOUSING, MEDICAL CARE, AND HEATING?: NOT VERY HARD

## 2023-11-15 SDOH — ECONOMIC STABILITY: FOOD INSECURITY: WITHIN THE PAST 12 MONTHS, YOU WORRIED THAT YOUR FOOD WOULD RUN OUT BEFORE YOU GOT MONEY TO BUY MORE.: SOMETIMES TRUE

## 2023-11-16 ENCOUNTER — TELEMEDICINE (OUTPATIENT)
Dept: INTERNAL MEDICINE CLINIC | Age: 33
End: 2023-11-16
Payer: COMMERCIAL

## 2023-11-16 DIAGNOSIS — F11.20 SEVERE OPIOID DEPENDENCE ON MAINTENANCE THERAPY (HCC): Primary | ICD-10-CM

## 2023-11-16 DIAGNOSIS — F11.20 SEVERE OPIOID USE DISORDER (HCC): ICD-10-CM

## 2023-11-16 PROCEDURE — 99214 OFFICE O/P EST MOD 30 MIN: CPT | Performed by: NURSE PRACTITIONER

## 2023-11-16 PROCEDURE — G8428 CUR MEDS NOT DOCUMENT: HCPCS | Performed by: NURSE PRACTITIONER

## 2023-11-16 RX ORDER — BUPRENORPHINE AND NALOXONE 8; 2 MG/1; MG/1
1 FILM, SOLUBLE BUCCAL; SUBLINGUAL 2 TIMES DAILY
Qty: 56 FILM | Refills: 0 | Status: SHIPPED | OUTPATIENT
Start: 2023-11-16 | End: 2023-12-14

## 2023-11-16 ASSESSMENT — ENCOUNTER SYMPTOMS: NAUSEA: 0

## 2023-11-16 NOTE — PROGRESS NOTES
[facility], was evaluated through a synchronous (real-time) audio-video encounter. The patient (or guardian if applicable) is aware that this is a billable service, which includes applicable co-pays. This Virtual Visit was conducted with patient's (and/or legal guardian's) consent. Patient identification was verified, and a caregiver was present when appropriate. The patient was located at Home: [address] [address]  Provider was located at [facility] ([address]): [address] [place] [address]  [address]      Jessie [facility] (:  1990) is a Established patient, presenting virtually for evaluation of the following: severe opioid use disorder and maintenance     Assessment & Plan   Below is the assessment and plan developed based on review of pertinent history, physical exam, labs, studies, and medications. 1. Severe opioid dependence on maintenance therapy ([address])  - OARRS reviewed, no discrepancies  - Narcan at home  - Continue counseling as scheduled  2. Severe opioid use disorder (HCC)  -     buprenorphine-naloxone (SUBOXONE) 8-2 MG FILM SL film; Place 1 Film under the tongue 2 times daily for 28 days. Max Daily Amount: 2 Film, Disp-56 Film, R-0Normal    Return in about 4 weeks (around 2023), or at 3:40 pm.       Subjective     Previous MAT patient of Dr. Marianela Garces. Last seen 4 weeks ago. Urges and cravings better controlled with Suboxone 8 mg BID. She denies any use     Is active in counseling     Hep C negative    Review of Systems   Constitutional:  Negative for chills. Gastrointestinal:  Negative for nausea. Psychiatric/Behavioral:  Negative for sleep disturbance. All other systems reviewed and are negative.          Objective   Patient-Reported Vitals  No data recorded     Physical Exam  [INSTRUCTIONS:  \"[x]\" Indicates a positive item  \"[]\" Indicates a negative item  -- DELETE ALL ITEMS NOT EXAMINED]    Constitutional: [x] Appears

## 2023-12-06 DIAGNOSIS — F90.2 ATTENTION DEFICIT HYPERACTIVITY DISORDER, COMBINED TYPE: ICD-10-CM

## 2023-12-06 DIAGNOSIS — F90.2 ADHD (ATTENTION DEFICIT HYPERACTIVITY DISORDER), COMBINED TYPE: ICD-10-CM

## 2023-12-06 NOTE — TELEPHONE ENCOUNTER
Nadine Gleason called into the office stating that she needs a refill on both of her Adderall medications; IR 10mg and XR 20mg. Per chart they were both sent on 11/08/23 for a 30 day supply with 0 refills. Medications are pending your approval for a 30 day supply with 0 refills; she is scheduled to return on 12/21/23. Last seen on 10/25/23.

## 2023-12-07 RX ORDER — DEXTROAMPHETAMINE SACCHARATE, AMPHETAMINE ASPARTATE MONOHYDRATE, DEXTROAMPHETAMINE SULFATE AND AMPHETAMINE SULFATE 5; 5; 5; 5 MG/1; MG/1; MG/1; MG/1
20 CAPSULE, EXTENDED RELEASE ORAL EVERY MORNING
Qty: 30 CAPSULE | Refills: 0 | Status: SHIPPED | OUTPATIENT
Start: 2023-12-07 | End: 2024-01-06

## 2023-12-07 RX ORDER — DEXTROAMPHETAMINE SACCHARATE, AMPHETAMINE ASPARTATE, DEXTROAMPHETAMINE SULFATE AND AMPHETAMINE SULFATE 2.5; 2.5; 2.5; 2.5 MG/1; MG/1; MG/1; MG/1
10 TABLET ORAL DAILY
Qty: 30 TABLET | Refills: 0 | Status: SHIPPED | OUTPATIENT
Start: 2023-12-07 | End: 2024-01-06

## 2023-12-21 ENCOUNTER — NURSE ONLY (OUTPATIENT)
Dept: INTERNAL MEDICINE CLINIC | Age: 33
End: 2023-12-21
Payer: COMMERCIAL

## 2023-12-21 VITALS
SYSTOLIC BLOOD PRESSURE: 111 MMHG | BODY MASS INDEX: 19.13 KG/M2 | HEIGHT: 66 IN | DIASTOLIC BLOOD PRESSURE: 75 MMHG | WEIGHT: 119 LBS | RESPIRATION RATE: 18 BRPM | HEART RATE: 86 BPM

## 2023-12-21 DIAGNOSIS — F11.20 SEVERE OPIOID USE DISORDER (HCC): ICD-10-CM

## 2023-12-21 PROCEDURE — 80305 DRUG TEST PRSMV DIR OPT OBS: CPT | Performed by: NURSE PRACTITIONER

## 2023-12-21 RX ORDER — KETOCONAZOLE 20 MG/ML
SHAMPOO TOPICAL
Qty: 120 ML | Refills: 2 | Status: SHIPPED | OUTPATIENT
Start: 2023-12-21

## 2023-12-21 RX ORDER — BUPRENORPHINE AND NALOXONE 8; 2 MG/1; MG/1
1 FILM, SOLUBLE BUCCAL; SUBLINGUAL 2 TIMES DAILY
Qty: 56 FILM | Refills: 0 | Status: SHIPPED | OUTPATIENT
Start: 2023-12-21 | End: 2024-01-18

## 2023-12-27 RX ORDER — CLONIDINE HYDROCHLORIDE 0.1 MG/1
0.1 TABLET ORAL NIGHTLY
Qty: 30 TABLET | Refills: 2 | Status: SHIPPED | OUTPATIENT
Start: 2023-12-27

## 2023-12-27 NOTE — TELEPHONE ENCOUNTER
Bennett Maldonado is requesting a refill on the following medications:  Requested Prescriptions     Pending Prescriptions Disp Refills    cloNIDine (CATAPRES) 0.1 MG tablet 30 tablet 2     Sig: Take 1 tablet by mouth nightly       Date of last visit: 10/25/2023  Date of next visit (if applicable):2/7/2024  Pharmacy Name: [facility]      [place]  [place], [place]

## 2024-01-03 DIAGNOSIS — F90.2 ADHD (ATTENTION DEFICIT HYPERACTIVITY DISORDER), COMBINED TYPE: ICD-10-CM

## 2024-01-03 DIAGNOSIS — F90.2 ATTENTION DEFICIT HYPERACTIVITY DISORDER, COMBINED TYPE: ICD-10-CM

## 2024-01-03 RX ORDER — DEXTROAMPHETAMINE SACCHARATE, AMPHETAMINE ASPARTATE, DEXTROAMPHETAMINE SULFATE AND AMPHETAMINE SULFATE 2.5; 2.5; 2.5; 2.5 MG/1; MG/1; MG/1; MG/1
10 TABLET ORAL DAILY
Qty: 30 TABLET | Refills: 0 | Status: SHIPPED | OUTPATIENT
Start: 2024-01-03 | End: 2024-02-02

## 2024-01-03 RX ORDER — DEXTROAMPHETAMINE SACCHARATE, AMPHETAMINE ASPARTATE MONOHYDRATE, DEXTROAMPHETAMINE SULFATE AND AMPHETAMINE SULFATE 5; 5; 5; 5 MG/1; MG/1; MG/1; MG/1
20 CAPSULE, EXTENDED RELEASE ORAL EVERY MORNING
Qty: 30 CAPSULE | Refills: 0 | Status: SHIPPED | OUTPATIENT
Start: 2024-01-03 | End: 2024-02-02

## 2024-01-03 NOTE — TELEPHONE ENCOUNTER
Marilynn Anne called requesting a refill on the following medications:  Requested Prescriptions     Pending Prescriptions Disp Refills    amphetamine-dextroamphetamine (ADDERALL XR) 20 MG extended release capsule 30 capsule 0     Sig: Take 1 capsule by mouth every morning for 30 days. Max Daily Amount: 20 mg    amphetamine-dextroamphetamine (ADDERALL, 10MG,) 10 MG tablet 30 tablet 0     Sig: Take 1 tablet by mouth daily for 30 days. Take in the afternoon. Max Daily Amount: 10 mg     Reports the Xr 20 mg is due shortly after the 10 mg. Reports the 10 mg is due around the 7th.     Date of last visit: 10/25/2023  Date of next visit (if applicable):2/7/2024  Pharmacy Name: [facility]

## 2024-01-29 DIAGNOSIS — F90.2 ATTENTION DEFICIT HYPERACTIVITY DISORDER, COMBINED TYPE: ICD-10-CM

## 2024-01-29 DIAGNOSIS — F90.2 ADHD (ATTENTION DEFICIT HYPERACTIVITY DISORDER), COMBINED TYPE: ICD-10-CM

## 2024-01-29 RX ORDER — DEXTROAMPHETAMINE SACCHARATE, AMPHETAMINE ASPARTATE, DEXTROAMPHETAMINE SULFATE AND AMPHETAMINE SULFATE 2.5; 2.5; 2.5; 2.5 MG/1; MG/1; MG/1; MG/1
10 TABLET ORAL DAILY
Qty: 30 TABLET | Refills: 0 | Status: SHIPPED | OUTPATIENT
Start: 2024-01-29 | End: 2024-02-28

## 2024-01-29 RX ORDER — DEXTROAMPHETAMINE SACCHARATE, AMPHETAMINE ASPARTATE MONOHYDRATE, DEXTROAMPHETAMINE SULFATE AND AMPHETAMINE SULFATE 5; 5; 5; 5 MG/1; MG/1; MG/1; MG/1
20 CAPSULE, EXTENDED RELEASE ORAL EVERY MORNING
Qty: 30 CAPSULE | Refills: 0 | Status: SHIPPED | OUTPATIENT
Start: 2024-01-29 | End: 2024-02-28

## 2024-01-29 RX ORDER — BUPROPION HYDROCHLORIDE 200 MG/1
200 TABLET, EXTENDED RELEASE ORAL 2 TIMES DAILY
Qty: 60 TABLET | Refills: 2 | Status: SHIPPED | OUTPATIENT
Start: 2024-01-29

## 2024-01-29 NOTE — TELEPHONE ENCOUNTER
Patient called in requesting the following medication refills:    Amphetamine-dextroamphetamine  20mg Extended Release  #30 with no refills to the [facility] in [facility]. Previous prescription sent on 01/03/24 #30 with no refills.    Amphetamine-Dextroamphetamine 10mg  #30 with no refills to the [facility] In [facility]. Previous prescription was sent on 01/03/24 for #30 with no refills.    Bupropion 200mg Extended Release  #60 with 2 refills to the [facility] in [facility]. Previous prescription sent on 10/25/23 for #60 with 2 refills     Last visit 10/25/24 (Cancelled today's appointment due to family emergency)  Next visit 02/07/24    Pending your approval

## 2024-02-07 ENCOUNTER — TELEPHONE (OUTPATIENT)
Dept: PSYCHIATRY | Age: 34
End: 2024-02-07

## 2024-02-07 ENCOUNTER — TELEMEDICINE (OUTPATIENT)
Dept: PSYCHIATRY | Age: 34
End: 2024-02-07
Payer: COMMERCIAL

## 2024-02-07 DIAGNOSIS — G47.00 INSOMNIA, UNSPECIFIED TYPE: ICD-10-CM

## 2024-02-07 DIAGNOSIS — F90.2 ATTENTION DEFICIT HYPERACTIVITY DISORDER, COMBINED TYPE: Primary | ICD-10-CM

## 2024-02-07 DIAGNOSIS — F41.9 ANXIETY: ICD-10-CM

## 2024-02-07 PROCEDURE — G8427 DOCREV CUR MEDS BY ELIG CLIN: HCPCS | Performed by: PSYCHIATRY & NEUROLOGY

## 2024-02-07 PROCEDURE — 99215 OFFICE O/P EST HI 40 MIN: CPT | Performed by: PSYCHIATRY & NEUROLOGY

## 2024-02-07 RX ORDER — DEXTROAMPHETAMINE SACCHARATE, AMPHETAMINE ASPARTATE, DEXTROAMPHETAMINE SULFATE AND AMPHETAMINE SULFATE 5; 5; 5; 5 MG/1; MG/1; MG/1; MG/1
20 TABLET ORAL 2 TIMES DAILY
Qty: 60 TABLET | Refills: 0 | Status: SHIPPED | OUTPATIENT
Start: 2024-02-07 | End: 2024-03-08

## 2024-02-07 RX ORDER — CLONIDINE HYDROCHLORIDE 0.1 MG/1
0.1 TABLET ORAL NIGHTLY
Qty: 30 TABLET | Refills: 2 | Status: SHIPPED | OUTPATIENT
Start: 2024-02-07

## 2024-02-07 NOTE — PROGRESS NOTES
[facility] PHYSICIANS [place] SPECIALTY  [facility] PSYCHIATRY  [address] [facility] [id number] [phone]    Progress Note    Patient:  Marilynn Anne  YOB: 1990  PCP:  Jaleesa Crews, APRN - CNP  Visit Date:  2/7/2024      Chief Complaint   Patient presents with    Follow-up    Medication Check    ADHD    Anxiety    Insomnia       SUBJECTIVE:    Time in: 3:06pm  Time out: 3:46pm  Time spent on documentation: 5 minutes    Marilynn Anne, a 33 y.o. female, presents for a follow up visit.      She presents alone.   Focus is improving with Adderall XR/IR.   Notes IR feels more effective. Takes XR at 9am and by 12:30pm can't feel it working. After lunch takes IR 10 mg around 1-2pm and by 4pm \"I'm done.\"  Symptoms don't feel covered through the whole day.   Discusses family stressors. Her dad has a place in [place]. Wanted to move to [place] but her mom wanted to stay here. He got depressed after that.   Her mom is from [place] and Dad is from [place] \"he's a different person in [place]\".   Lived in [place] from '11-19. Discusses living big city vs small town.  Her sister is moving back home. Notes something happened with her job. Notes her \"half truths\". Her brother still lives in [place] Discusses the difference between her parents. Her more relaxed go with the flow father and her more dramatic mother.  Notes her dtr keeps her grounded.   Notes difference between her mom and dad. She's more   Feels calmer, slower. Improved with ADHD. Anxiety is better.   No SI or psychosis.   Discussed tx options and we agree to change Adderall to all IR dosing.       Med Trials:Paxil (disliked, \"zombie\", nausea)       OBJECTIVE:  Vitals: There were no vitals taken for this visit.    MENTAL STATUS EXAM:    GENERAL  Build: Normal    Hygiene:  Appropriate, well groomed in casual dress   SENSORIUM Orientation: Place, Person, Time, & Situation     Consciousness: Alert    ATTENTION

## 2024-02-07 NOTE — TELEPHONE ENCOUNTER
Daiana (pharmacist) at [facility] called into the office stating that they just received a Rx today for Marilynn for Adderall IR 20mg BID but they have in their notes that she has XR 20mg at home along with IR 10mg?     They are wanting to know what doses she is supposed to be on? Per chart she was seen today and the note is not completed for clarification. Please advise.

## 2024-02-07 NOTE — TELEPHONE ENCOUNTER
Today at her appointment we made changes.   Stop Adderall XR 20 mg in morning.   Change Adderall IR from 10 mg in afternoon to 20 mg twice daily.   Marilynn had recent fill on her Adderall IR 10 mg tablets and can use the remaining 10 mg tabs to make 20 mg BID dose before picking up new Rx.   Electronically signed by Juliana Iniguez MD on 2/7/2024 at 4:42 PM

## 2024-02-08 NOTE — TELEPHONE ENCOUNTER
Spoke with Derrick (pharmacist) and informed him of this information; he said that he would just put the new Rx on hold for now until she uses her IR 10mg up at home.

## 2024-02-13 ENCOUNTER — OFFICE VISIT (OUTPATIENT)
Dept: INTERNAL MEDICINE CLINIC | Age: 34
End: 2024-02-13
Payer: COMMERCIAL

## 2024-02-13 VITALS
SYSTOLIC BLOOD PRESSURE: 117 MMHG | DIASTOLIC BLOOD PRESSURE: 86 MMHG | RESPIRATION RATE: 18 BRPM | BODY MASS INDEX: 18.93 KG/M2 | HEART RATE: 107 BPM | HEIGHT: 66 IN | WEIGHT: 117.8 LBS

## 2024-02-13 DIAGNOSIS — F11.20 SEVERE OPIOID DEPENDENCE ON MAINTENANCE THERAPY (HCC): Primary | ICD-10-CM

## 2024-02-13 DIAGNOSIS — F11.20 SEVERE OPIOID USE DISORDER (HCC): ICD-10-CM

## 2024-02-13 PROCEDURE — 80305 DRUG TEST PRSMV DIR OPT OBS: CPT | Performed by: NURSE PRACTITIONER

## 2024-02-13 PROCEDURE — G8427 DOCREV CUR MEDS BY ELIG CLIN: HCPCS | Performed by: NURSE PRACTITIONER

## 2024-02-13 PROCEDURE — G8420 CALC BMI NORM PARAMETERS: HCPCS | Performed by: NURSE PRACTITIONER

## 2024-02-13 PROCEDURE — 99214 OFFICE O/P EST MOD 30 MIN: CPT | Performed by: NURSE PRACTITIONER

## 2024-02-13 PROCEDURE — 4004F PT TOBACCO SCREEN RCVD TLK: CPT | Performed by: NURSE PRACTITIONER

## 2024-02-13 PROCEDURE — G8484 FLU IMMUNIZE NO ADMIN: HCPCS | Performed by: NURSE PRACTITIONER

## 2024-02-13 PROCEDURE — G2211 COMPLEX E/M VISIT ADD ON: HCPCS | Performed by: NURSE PRACTITIONER

## 2024-02-13 RX ORDER — KETOCONAZOLE 20 MG/ML
SHAMPOO TOPICAL
Qty: 120 ML | Refills: 2 | Status: SHIPPED | OUTPATIENT
Start: 2024-02-13

## 2024-02-13 RX ORDER — BUPRENORPHINE AND NALOXONE 8; 2 MG/1; MG/1
1 FILM, SOLUBLE BUCCAL; SUBLINGUAL 2 TIMES DAILY
Qty: 56 FILM | Refills: 0 | Status: SHIPPED | OUTPATIENT
Start: 2024-02-13 | End: 2024-03-12

## 2024-02-13 NOTE — PROGRESS NOTES
External ear normal.      Nose: Nose normal. No congestion or rhinorrhea.      Mouth/Throat:      Mouth: Mucous membranes are moist.   Eyes:      Extraocular Movements: Extraocular movements intact.      Conjunctiva/sclera: Conjunctivae normal.      Pupils: Pupils are equal, round, and reactive to light.   Pulmonary:      Effort: Pulmonary effort is normal. No respiratory distress.   Musculoskeletal:         General: Normal range of motion.      Cervical back: Normal range of motion and neck supple.      Right lower leg: No edema.      Left lower leg: No edema.   Skin:     General: Skin is warm and dry.   Neurological:      General: No focal deficit present.      Mental Status: She is alert and oriented to person, place, and time.   Psychiatric:         Mood and Affect: Mood normal.         Behavior: Behavior normal.         Thought Content: Thought content normal.         Judgment: Judgment normal.         Labs Reviewed 2/13/2024:    No results found for: \"WBC\", \"HGB\", \"HCT\", \"PLT\", \"CHOL\", \"TRIG\", \"HDL\", \"LDLDIRECT\", \"ALT\", \"AST\", \"NA\", \"K\", \"CL\", \"CREATININE\", \"BUN\", \"CO2\", \"TSH\", \"PSA\", \"INR\", \"GLUF\", \"LABA1C\"    Assessment/Plan      1. Severe opioid use disorder (HCC)    - POCT Rapid Drug Screen  - buprenorphine-naloxone (SUBOXONE) 8-2 MG FILM SL film; Place 1 Film under the tongue 2 times daily for 28 days. Max Daily Amount: 2 Film  Dispense: 56 Film; Refill: 0    2. Severe opioid dependence on maintenance therapy (HCC)    - OARRS reviewed, no discrepancies  - Narcan at home  - Continue counseling as scheduled      Return in about 4 weeks (around 3/12/2024).    Patient given educational materials - see patient instructions.  Discussed use, benefit, and side effects of prescribed medications.  All patient questions answered.  Pt voiced understanding.  Reviewed health maintenance.       Electronically signed byJAQUELINE Barotn CNP on 2/13/24 at 10:13 AM EST

## 2024-03-11 DIAGNOSIS — F90.2 ATTENTION DEFICIT HYPERACTIVITY DISORDER, COMBINED TYPE: ICD-10-CM

## 2024-03-11 NOTE — TELEPHONE ENCOUNTER
Marilynn Anne called requesting a refill on the following medications:  Requested Prescriptions     Pending Prescriptions Disp Refills    amphetamine-dextroamphetamine (ADDERALL, 20MG,) 20 MG tablet 60 tablet 0     Sig: Take 1 tablet by mouth 2 times daily for 30 days. TAKE IN MORNING AND AFTERNOON Max Daily Amount: 40 mg     She asks staff to inform provider that medication is working well, no issues with headaches.     Date of last visit: 2/7/2024  Date of next visit (if applicable):3/21/2024  Pharmacy Name: [facility]      Thanks,  Debi Govea MA      included. · Give your child lots of fluids, enough so that the urine is light yellow or clear like water. This is very important if your child is vomiting or has diarrhea. Give your child sips of water or drinks such as Pedialyte or Infalyte. These drinks contain a mix of salt, sugar, and minerals. You can buy them at drugstores or grocery stores. Give these drinks as long as your child is throwing up or has diarrhea. Do not use them as the only source of liquids or food for more than 12 to 24 hours. · Keep your child home from school, day care, or other public places while he or she has a fever. · Use cold, wet cloths on a rash to reduce itching. When should you call for help? Call your doctor now or seek immediate medical care if:    · Your child has signs of needing more fluids. These signs include sunken eyes with few tears, dry mouth with little or no spit, and little or no urine for 6 hours.    Watch closely for changes in your child's health, and be sure to contact your doctor if:    · Your child has a new or higher fever.     · Your child is not feeling better within 2 days.     · Your child's symptoms are getting worse. Where can you learn more? Go to https://CareempeArkimediaeb.Radcom. org and sign in to your [employer] account. Enter 268 [id number] in the [facility] box to learn more about \"Viral Illness in Children: Care Instructions. \"     If you do not have an account, please click on the \"Sign Up Now\" link. Current as of: June 9, 2019  Content Version: 12.3  © 7923-3650 Healthwise, Incorporated. Care instructions adapted under license by [facility] ([facility]). If you have questions about a medical condition or this instruction, always ask your healthcare professional. [place] [address] any warranty or liability for your use of this information.

## 2024-03-12 RX ORDER — DEXTROAMPHETAMINE SACCHARATE, AMPHETAMINE ASPARTATE, DEXTROAMPHETAMINE SULFATE AND AMPHETAMINE SULFATE 5; 5; 5; 5 MG/1; MG/1; MG/1; MG/1
20 TABLET ORAL 2 TIMES DAILY
Qty: 60 TABLET | Refills: 0 | Status: SHIPPED | OUTPATIENT
Start: 2024-03-12 | End: 2024-04-11

## 2024-03-14 ENCOUNTER — TELEMEDICINE (OUTPATIENT)
Dept: INTERNAL MEDICINE CLINIC | Age: 34
End: 2024-03-14
Payer: COMMERCIAL

## 2024-03-14 DIAGNOSIS — F11.20 SEVERE OPIOID USE DISORDER (HCC): ICD-10-CM

## 2024-03-14 DIAGNOSIS — F11.20 SEVERE OPIOID USE DISORDER ON MAINTENANCE THERAPY (HCC): Primary | ICD-10-CM

## 2024-03-14 PROCEDURE — 99214 OFFICE O/P EST MOD 30 MIN: CPT | Performed by: NURSE PRACTITIONER

## 2024-03-14 PROCEDURE — G2211 COMPLEX E/M VISIT ADD ON: HCPCS | Performed by: NURSE PRACTITIONER

## 2024-03-14 PROCEDURE — G8427 DOCREV CUR MEDS BY ELIG CLIN: HCPCS | Performed by: NURSE PRACTITIONER

## 2024-03-14 RX ORDER — BUPRENORPHINE AND NALOXONE 8; 2 MG/1; MG/1
1 FILM, SOLUBLE BUCCAL; SUBLINGUAL 2 TIMES DAILY
Qty: 56 FILM | Refills: 0 | Status: SHIPPED | OUTPATIENT
Start: 2024-03-14 | End: 2024-04-11

## 2024-03-14 ASSESSMENT — ENCOUNTER SYMPTOMS: NAUSEA: 0

## 2024-03-14 NOTE — PROGRESS NOTES
item  -- DELETE ALL ITEMS NOT EXAMINED]    Constitutional: [x] Appears well-developed and well-nourished [x] No apparent distress      [] Abnormal -     Mental status: [x] Alert and awake  [x] Oriented to person/place/time [x] Able to follow commands    [] Abnormal -     Eyes:   EOM    [x]  Normal    [] Abnormal -   Sclera  [x]  Normal    [] Abnormal -          Discharge [x]  None visible   [] Abnormal -     HENT: [x] Normocephalic, atraumatic  [] Abnormal -   [x] Mouth/Throat: Mucous membranes are moist    External Ears [x] Normal  [] Abnormal -    Neck: [x] No visualized mass [] Abnormal -     Pulmonary/Chest: [x] Respiratory effort normal   [x] No visualized signs of difficulty breathing or respiratory distress        [] Abnormal -      Musculoskeletal:   [x] Normal gait with no signs of ataxia         [x] Normal range of motion of neck        [] Abnormal -     Neurological:        [x] No Facial Asymmetry (Cranial nerve 7 motor function) (limited exam due to video visit)          [x] No gaze palsy        [] Abnormal -          Skin:        [x] No significant exanthematous lesions or discoloration noted on facial skin         [] Abnormal -            Psychiatric:       [x] Normal Affect [] Abnormal -        [x] No Hallucinations    Other pertinent observable physical exam findings:- none           --Jaleesa Crews, JAQUELINE - CNP

## 2024-04-09 DIAGNOSIS — F90.2 ATTENTION DEFICIT HYPERACTIVITY DISORDER, COMBINED TYPE: ICD-10-CM

## 2024-04-09 RX ORDER — DEXTROAMPHETAMINE SACCHARATE, AMPHETAMINE ASPARTATE, DEXTROAMPHETAMINE SULFATE AND AMPHETAMINE SULFATE 5; 5; 5; 5 MG/1; MG/1; MG/1; MG/1
20 TABLET ORAL 2 TIMES DAILY
Qty: 60 TABLET | Refills: 0 | Status: SHIPPED | OUTPATIENT
Start: 2024-04-09 | End: 2024-05-07 | Stop reason: SDUPTHER

## 2024-04-09 NOTE — TELEPHONE ENCOUNTER
Marilynn wrote into the office requesting a medication refill on Adderall 20mg;#60 with 0 refills;last with a start date of 03/12/24.     Medication is pending your approval for a 30 day supply with 0 refills; she was last seen on 02/07/24. Scheduled to return on 05/02/24.

## 2024-04-09 NOTE — TELEPHONE ENCOUNTER
Patient called regarding refill request. Mentioned clonidine may be due. Per chart review, patient should have one remaining refill on file at the pharmacy for clonidine. She will confirm this with the pharmacy as well. Patient mentions she isn't due for Adderall until Friday, she wanted to make sure provider has a couple of days to address the refill request.

## 2024-05-07 DIAGNOSIS — F90.2 ATTENTION DEFICIT HYPERACTIVITY DISORDER, COMBINED TYPE: ICD-10-CM

## 2024-05-07 NOTE — TELEPHONE ENCOUNTER
Marilynn called into the office stating that she was supposed to be seen today but due to the provider being out she was r/sed to 06/18/24.    While on the phone, she said that she needs a refill on her Adderall 20mg;#60 with 0 refills;last with a start date of 04/09/24.    Medication is pending your approval for a 30 day supply with 0 refills; she was last seen on 02/07/24.

## 2024-05-09 RX ORDER — DEXTROAMPHETAMINE SACCHARATE, AMPHETAMINE ASPARTATE, DEXTROAMPHETAMINE SULFATE AND AMPHETAMINE SULFATE 5; 5; 5; 5 MG/1; MG/1; MG/1; MG/1
20 TABLET ORAL 2 TIMES DAILY
Qty: 60 TABLET | Refills: 0 | Status: SHIPPED | OUTPATIENT
Start: 2024-05-09 | End: 2024-06-08

## 2024-05-20 ENCOUNTER — OFFICE VISIT (OUTPATIENT)
Dept: INTERNAL MEDICINE CLINIC | Age: 34
End: 2024-05-20
Payer: COMMERCIAL

## 2024-05-20 VITALS
DIASTOLIC BLOOD PRESSURE: 79 MMHG | HEIGHT: 66 IN | HEART RATE: 95 BPM | SYSTOLIC BLOOD PRESSURE: 128 MMHG | BODY MASS INDEX: 19.77 KG/M2 | WEIGHT: 123 LBS

## 2024-05-20 DIAGNOSIS — F90.2 ATTENTION DEFICIT HYPERACTIVITY DISORDER, COMBINED TYPE: ICD-10-CM

## 2024-05-20 DIAGNOSIS — F11.20 SEVERE OPIOID USE DISORDER (HCC): ICD-10-CM

## 2024-05-20 PROCEDURE — 99214 OFFICE O/P EST MOD 30 MIN: CPT | Performed by: NURSE PRACTITIONER

## 2024-05-20 PROCEDURE — 4004F PT TOBACCO SCREEN RCVD TLK: CPT | Performed by: NURSE PRACTITIONER

## 2024-05-20 PROCEDURE — 80305 DRUG TEST PRSMV DIR OPT OBS: CPT | Performed by: NURSE PRACTITIONER

## 2024-05-20 PROCEDURE — G8420 CALC BMI NORM PARAMETERS: HCPCS | Performed by: NURSE PRACTITIONER

## 2024-05-20 PROCEDURE — G8427 DOCREV CUR MEDS BY ELIG CLIN: HCPCS | Performed by: NURSE PRACTITIONER

## 2024-05-20 RX ORDER — BUPRENORPHINE AND NALOXONE 8; 2 MG/1; MG/1
1 FILM, SOLUBLE BUCCAL; SUBLINGUAL 2 TIMES DAILY
Qty: 56 FILM | Refills: 0 | Status: SHIPPED | OUTPATIENT
Start: 2024-05-20 | End: 2024-06-17

## 2024-05-20 NOTE — PROGRESS NOTES
[facility] PROFESSIONAL SERVS  [facility]  [address] [facility]  [address]  [place] [id number]  Dept: [phone]  Dept Fax: [phone]  Loc: [phone]     Visit Date:  5/20/2024    Patient:  Marilynn Anne  YOB: 1990    HPI:     Chief Complaint   Patient presents with    Drug Problem     Marilynn presents today for medical evaluation of severe opioid use disorder     I last seen her 4 weeks ago.     Urges and cravings better controlled with Suboxone 8 mg BID.      Urine positive for amphetamines and buprenorphine      She denies any use     Is active in counseling     Hep C negative     Was in [place] for a conference- got food poisoning 3 weeks ago- has been working out-      Stopped taking wellbutrin- does not feel better on it- learned to cope with stress  Adderall has helped her stay focused       Medications    Current Outpatient Medications:     buprenorphine-naloxone (SUBOXONE) 8-2 MG FILM SL film, Place 1 Film under the tongue 2 times daily for 28 days. Max Daily Amount: 2 Film, Disp: 56 Film, Rfl: 0    ketoconazole (NIZORAL) 2 % shampoo, Apply topically daily as needed., Disp: 120 mL, Rfl: 2    cloNIDine (CATAPRES) 0.1 MG tablet, Take 1 tablet by mouth nightly, Disp: 30 tablet, Rfl: 2    amphetamine-dextroamphetamine (ADDERALL, 20MG,) 20 MG tablet, Take 1 tablet by mouth 2 times daily for 30 days. TAKE IN MORNING AND AFTERNOON Max Daily Amount: 40 mg, Disp: 60 tablet, Rfl: 0    buPROPion (WELLBUTRIN SR) 200 MG extended release tablet, Take 1 tablet by mouth 2 times daily (Patient not taking: Reported on 5/20/2024), Disp: 60 tablet, Rfl: 2    The patient has No Known Allergies.    Past Medical History  Marilynn  has a past medical history of MRSA (methicillin resistant staph aureus) culture positive.    Past Surgical History  The patient  has a past surgical history that includes Ankle surgery.    Family History  This

## 2024-06-04 DIAGNOSIS — F90.2 ATTENTION DEFICIT HYPERACTIVITY DISORDER, COMBINED TYPE: ICD-10-CM

## 2024-06-04 RX ORDER — DEXTROAMPHETAMINE SACCHARATE, AMPHETAMINE ASPARTATE, DEXTROAMPHETAMINE SULFATE AND AMPHETAMINE SULFATE 5; 5; 5; 5 MG/1; MG/1; MG/1; MG/1
20 TABLET ORAL 2 TIMES DAILY
Qty: 60 TABLET | Refills: 0 | Status: SHIPPED | OUTPATIENT
Start: 2024-06-04 | End: 2024-07-04

## 2024-06-04 NOTE — TELEPHONE ENCOUNTER
Patient called in requesting a refill of the following mediation:    Amphetamine-Dextroamphetamine 20mg  #60 with no refills to the [facility]. Previous prescription was sent on 05/09/24 for #60 with no refills.    Last visit 02/07/24  Next visit 06/18/24    Pending your approval

## 2024-06-13 ASSESSMENT — ENCOUNTER SYMPTOMS: NAUSEA: 0

## 2024-06-18 ENCOUNTER — TELEMEDICINE (OUTPATIENT)
Dept: INTERNAL MEDICINE CLINIC | Age: 34
End: 2024-06-18
Payer: COMMERCIAL

## 2024-06-18 ENCOUNTER — TELEMEDICINE (OUTPATIENT)
Dept: PSYCHIATRY | Age: 34
End: 2024-06-18
Payer: COMMERCIAL

## 2024-06-18 DIAGNOSIS — G47.00 INSOMNIA, UNSPECIFIED TYPE: ICD-10-CM

## 2024-06-18 DIAGNOSIS — F90.2 ATTENTION DEFICIT HYPERACTIVITY DISORDER, COMBINED TYPE: ICD-10-CM

## 2024-06-18 DIAGNOSIS — F41.9 ANXIETY: Primary | ICD-10-CM

## 2024-06-18 DIAGNOSIS — F11.20 SEVERE OPIOID USE DISORDER (HCC): ICD-10-CM

## 2024-06-18 DIAGNOSIS — F11.20 SEVERE OPIOID DEPENDENCE ON MAINTENANCE THERAPY (HCC): Primary | ICD-10-CM

## 2024-06-18 PROCEDURE — 90833 PSYTX W PT W E/M 30 MIN: CPT | Performed by: PSYCHIATRY & NEUROLOGY

## 2024-06-18 PROCEDURE — 99213 OFFICE O/P EST LOW 20 MIN: CPT | Performed by: PSYCHIATRY & NEUROLOGY

## 2024-06-18 PROCEDURE — 99214 OFFICE O/P EST MOD 30 MIN: CPT | Performed by: NURSE PRACTITIONER

## 2024-06-18 PROCEDURE — G8428 CUR MEDS NOT DOCUMENT: HCPCS | Performed by: NURSE PRACTITIONER

## 2024-06-18 PROCEDURE — G8427 DOCREV CUR MEDS BY ELIG CLIN: HCPCS | Performed by: PSYCHIATRY & NEUROLOGY

## 2024-06-18 RX ORDER — DEXTROAMPHETAMINE SACCHARATE, AMPHETAMINE ASPARTATE, DEXTROAMPHETAMINE SULFATE AND AMPHETAMINE SULFATE 5; 5; 5; 5 MG/1; MG/1; MG/1; MG/1
20 TABLET ORAL 2 TIMES DAILY
Qty: 60 TABLET | Refills: 0 | Status: SHIPPED | OUTPATIENT
Start: 2024-06-18 | End: 2024-07-18

## 2024-06-18 RX ORDER — CLONIDINE HYDROCHLORIDE 0.1 MG/1
0.1 TABLET ORAL NIGHTLY
Qty: 30 TABLET | Refills: 2 | Status: SHIPPED | OUTPATIENT
Start: 2024-06-18

## 2024-06-18 RX ORDER — BUPRENORPHINE AND NALOXONE 8; 2 MG/1; MG/1
1 FILM, SOLUBLE BUCCAL; SUBLINGUAL 2 TIMES DAILY
Qty: 56 FILM | Refills: 0 | Status: SHIPPED | OUTPATIENT
Start: 2024-06-18 | End: 2024-07-16

## 2024-06-18 ASSESSMENT — ENCOUNTER SYMPTOMS: NAUSEA: 0

## 2024-06-18 NOTE — PROGRESS NOTES
[facility] [place] [facility]  [facility] PSYCHIATRY  [address] [id number] [phone]    Progress Note    Patient:  Marilynn Anne  YOB: 1990  PCP:  Jaleesa Crews, APRN - CNP  Visit Date:  6/18/2024      Chief Complaint   Patient presents with    Follow-up    Medication Check    ADHD    Anxiety    Insomnia       SUBJECTIVE:    Time in: 4:10pm  Time out: 4:44pm  Time spent on documentation: 5 minutes    Marilynn Anne, a 34 y.o. female, presents for a follow up visit.      She presents alone.   Doing well.   Mood \"good\".   Discusses anxiety with traveling. Prefers to be home.   Her 8 yo dtr Aria is done with school for summer. Does homeschooling. Her dtr wants to attend school next year. Notes her dtr is responsible.   Discusses her parents' dynamic. Her mom often being late, not thinking how it impacts others.   Wants her mom to bring her father home from assisted living. Notes how her aunt influenced her mom to keep him there.   Notes change to all Adderall IR (BID) \"so much better\". No longer feels \"up and down\". Feels better off Adderall XR.  Wears off around 6pm. Might affect her in the evening.   Going to see her dad more with her dtr out of school.   Has been off Wellbutrin for awhile now. Mood is okay off it.   She notes her dad noticed a difference with her taking Adderall. \"I made more jokes\", happier, more organized. Taxes were done on time. \"It's night and day.\"  No SI or psychosis.   Declines to make any medication changes today.   Spent much of session in psychotherapy discussing current stressors, coping skills, strategies for change, supportive therapy, psycheducation.      Med Trials:Paxil (disliked, \"zombie\", nausea), Adderall XR, IR (better on IR)       OBJECTIVE:  Vitals: There were no vitals taken for this visit.    MENTAL STATUS EXAM:    GENERAL  Build: Normal    Hygiene:  Appropriate, in casual dress   SENSORIUM

## 2024-07-02 DIAGNOSIS — F90.2 ATTENTION DEFICIT HYPERACTIVITY DISORDER, COMBINED TYPE: ICD-10-CM

## 2024-07-02 NOTE — TELEPHONE ENCOUNTER
Covering for Dr. Iniguez. PDMP and chart reviewed. Refill not due until 7/6/24. Will defer refill to Dr. Iniguez upon her return.    Dr. Adames

## 2024-07-02 NOTE — TELEPHONE ENCOUNTER
*Dr. Iniguez patient-forwarding to covering provider    Patient contacted the office regarding Adderall Rx sent 06/18/2024. She reports she is unable to  her Rx when it's due as [facility] has closed this location as of today. She is requesting a new Rx to be sent to [facility] pharmacy in [place]. She does mention [facility] requires [facility] number to be listed on Rx from past experiences. [facility] pharmacy has been removed from the chart.     Requested Prescriptions     Pending Prescriptions Disp Refills    amphetamine-dextroamphetamine (ADDERALL, 20MG,) 20 MG tablet 60 tablet 0     Sig: Take 1 tablet by mouth 2 times daily for 30 days. TAKE IN MORNING AND AFTERNOON Max Daily Amount: 40 mg       Date of last visit: 6/18/2024  Date of next visit (if applicable):8/7/2024  Pharmacy Name: Debi Jean Baptiste MA

## 2024-07-04 RX ORDER — DEXTROAMPHETAMINE SACCHARATE, AMPHETAMINE ASPARTATE, DEXTROAMPHETAMINE SULFATE AND AMPHETAMINE SULFATE 5; 5; 5; 5 MG/1; MG/1; MG/1; MG/1
20 TABLET ORAL 2 TIMES DAILY
Qty: 60 TABLET | Refills: 0 | Status: SHIPPED | OUTPATIENT
Start: 2024-07-04 | End: 2024-08-03

## 2024-07-26 ENCOUNTER — TELEPHONE (OUTPATIENT)
Dept: INTERNAL MEDICINE CLINIC | Age: 34
End: 2024-07-26

## 2024-08-01 DIAGNOSIS — F90.2 ATTENTION DEFICIT HYPERACTIVITY DISORDER, COMBINED TYPE: ICD-10-CM

## 2024-08-01 RX ORDER — CLONIDINE HYDROCHLORIDE 0.1 MG/1
0.1 TABLET ORAL NIGHTLY
Qty: 30 TABLET | Refills: 2 | Status: SHIPPED | OUTPATIENT
Start: 2024-08-01

## 2024-08-01 RX ORDER — KETOCONAZOLE 20 MG/ML
SHAMPOO TOPICAL
Qty: 120 ML | Refills: 2 | Status: SHIPPED | OUTPATIENT
Start: 2024-08-01

## 2024-08-01 RX ORDER — DEXTROAMPHETAMINE SACCHARATE, AMPHETAMINE ASPARTATE, DEXTROAMPHETAMINE SULFATE AND AMPHETAMINE SULFATE 5; 5; 5; 5 MG/1; MG/1; MG/1; MG/1
20 TABLET ORAL 2 TIMES DAILY
Qty: 60 TABLET | Refills: 0 | Status: SHIPPED | OUTPATIENT
Start: 2024-08-01 | End: 2024-08-31

## 2024-08-01 NOTE — TELEPHONE ENCOUNTER
Marilynn Anne is requesting a refill on the following medications:  Requested Prescriptions     Pending Prescriptions Disp Refills    amphetamine-dextroamphetamine (ADDERALL, 20MG,) 20 MG tablet 60 tablet 0     Sig: Take 1 tablet by mouth 2 times daily for 30 days. TAKE IN MORNING AND AFTERNOON Max Daily Amount: 40 mg       Date of last visit: 6/18/2024  Date of next visit (if applicable):8/7/2024  Pharmacy Name: Debi Jean Baptiste MA

## 2024-08-08 ENCOUNTER — TELEMEDICINE (OUTPATIENT)
Dept: PSYCHIATRY | Age: 34
End: 2024-08-08

## 2024-08-08 DIAGNOSIS — F41.9 ANXIETY: ICD-10-CM

## 2024-08-08 DIAGNOSIS — F90.2 ATTENTION DEFICIT HYPERACTIVITY DISORDER, COMBINED TYPE: Primary | ICD-10-CM

## 2024-08-08 DIAGNOSIS — G47.00 INSOMNIA, UNSPECIFIED TYPE: ICD-10-CM

## 2024-08-08 RX ORDER — DEXTROAMPHETAMINE SACCHARATE, AMPHETAMINE ASPARTATE, DEXTROAMPHETAMINE SULFATE AND AMPHETAMINE SULFATE 5; 5; 5; 5 MG/1; MG/1; MG/1; MG/1
TABLET ORAL
Qty: 75 TABLET | Refills: 0 | Status: SHIPPED | OUTPATIENT
Start: 2024-08-08 | End: 2024-09-07

## 2024-08-08 NOTE — PROGRESS NOTES
current stressors, grief, coping skills, strategies for change, supportive therapy, psycheducation.. Remainder of time spent on symptom evaluation and medication management.       Marilynn Anne, was evaluated through a synchronous (real-time) audio-video encounter. The patient (or guardian if applicable) is aware that this is a billable service, which includes applicable co-pays. This Virtual Visit was conducted with patient's (and/or legal guardian's) consent. Patient identification was verified, and a caregiver was present when appropriate.   The patient was located at Home: [address] [place] [address], [address]  [place] [address]  Provider was located at Home (Appt Dept State): [place]       Total time spent for this encounter:  40 minutes    --Juliana Iniguez MD on 8/9/2024 at 10:15 AM    An electronic signature was used to authenticate this note.

## 2024-08-23 ENCOUNTER — TELEPHONE (OUTPATIENT)
Dept: PSYCHIATRY | Age: 34
End: 2024-08-23

## 2024-08-23 NOTE — TELEPHONE ENCOUNTER
Please let pharmacy know to fill Rx now as she's using Rx filled on 8/4 for Adderall IR 20 mg twice daily.   On 8/9 we had an appointment and increased Adderall IR to 20 mg twice daily (morning and lunch) and 10 mg in late afternoon.   She will run out early of this medication.   Electronically signed by Juliana Iniguez MD on 8/23/2024 at 11:53 AM

## 2024-08-23 NOTE — TELEPHONE ENCOUNTER
Patient called in asking for a refill because she is just about out of her Adderall. Patient stated on 08/08/24 her medication dose got changed to 20mg in the morning and at lunch then take a half a tablet in the  Patient stated she had picked up her last refill on 08/06/24 and was told to just use that prescription and cut one of them in half to use up all of that prescription she just filled.    Pharmacy wont fill the new prescription because it is to soon that they need your approval to fill it early.    Pending your advice.

## 2024-09-06 DIAGNOSIS — F90.2 ATTENTION DEFICIT HYPERACTIVITY DISORDER, COMBINED TYPE: ICD-10-CM

## 2024-09-06 RX ORDER — DEXTROAMPHETAMINE SACCHARATE, AMPHETAMINE ASPARTATE, DEXTROAMPHETAMINE SULFATE AND AMPHETAMINE SULFATE 5; 5; 5; 5 MG/1; MG/1; MG/1; MG/1
TABLET ORAL
Qty: 75 TABLET | Refills: 0 | Status: SHIPPED | OUTPATIENT
Start: 2024-09-06 | End: 2024-10-06

## 2024-10-01 ENCOUNTER — OFFICE VISIT (OUTPATIENT)
Dept: INTERNAL MEDICINE CLINIC | Age: 34
End: 2024-10-01
Payer: COMMERCIAL

## 2024-10-01 VITALS
HEART RATE: 108 BPM | SYSTOLIC BLOOD PRESSURE: 117 MMHG | DIASTOLIC BLOOD PRESSURE: 69 MMHG | RESPIRATION RATE: 16 BRPM | WEIGHT: 128 LBS | BODY MASS INDEX: 20.66 KG/M2

## 2024-10-01 DIAGNOSIS — F90.2 ATTENTION DEFICIT HYPERACTIVITY DISORDER, COMBINED TYPE: ICD-10-CM

## 2024-10-01 DIAGNOSIS — F11.20 SEVERE OPIOID USE DISORDER (HCC): Primary | ICD-10-CM

## 2024-10-01 LAB
ALCOHOL URINE: ABNORMAL
AMPHETAMINE SCREEN URINE: POSITIVE
BARBITURATE SCREEN URINE: ABNORMAL
BENZODIAZEPINE SCREEN, URINE: ABNORMAL
BUPRENORPHINE URINE: ABNORMAL
COCAINE METABOLITE SCREEN URINE: ABNORMAL
FENTANYL SCREEN, URINE: ABNORMAL
GABAPENTIN SCREEN, URINE: ABNORMAL
MDMA, URINE: ABNORMAL
METHADONE SCREEN, URINE: ABNORMAL
METHAMPHETAMINE, URINE: ABNORMAL
OPIATE SCREEN URINE: ABNORMAL
OXYCODONE SCREEN URINE: ABNORMAL
PHENCYCLIDINE SCREEN URINE: ABNORMAL
PROPOXYPHENE SCREEN, URINE: ABNORMAL
SYNTHETIC CANNABINOIDS(K2) SCREEN, URINE: ABNORMAL
THC SCREEN, URINE: ABNORMAL
TRAMADOL SCREEN URINE: ABNORMAL
TRICYCLIC ANTIDEPRESSANTS, UR: ABNORMAL

## 2024-10-01 PROCEDURE — 99211 OFF/OP EST MAY X REQ PHY/QHP: CPT | Performed by: NURSE PRACTITIONER

## 2024-10-01 PROCEDURE — G8427 DOCREV CUR MEDS BY ELIG CLIN: HCPCS | Performed by: NURSE PRACTITIONER

## 2024-10-01 PROCEDURE — G8420 CALC BMI NORM PARAMETERS: HCPCS | Performed by: NURSE PRACTITIONER

## 2024-10-01 PROCEDURE — 80305 DRUG TEST PRSMV DIR OPT OBS: CPT | Performed by: NURSE PRACTITIONER

## 2024-10-01 RX ORDER — CLONIDINE HYDROCHLORIDE 0.1 MG/1
0.1 TABLET ORAL NIGHTLY
Qty: 30 TABLET | Refills: 2 | Status: SHIPPED | OUTPATIENT
Start: 2024-10-01

## 2024-10-01 RX ORDER — BUPRENORPHINE AND NALOXONE 8; 2 MG/1; MG/1
1 FILM, SOLUBLE BUCCAL; SUBLINGUAL 2 TIMES DAILY
Qty: 56 FILM | Refills: 0 | Status: SHIPPED | OUTPATIENT
Start: 2024-10-01 | End: 2024-10-29

## 2024-10-01 NOTE — TELEPHONE ENCOUNTER
Patient called in requesting a refill of the following medication:    Amphetamine-Dextroamphetamine 20mg  #75 with no refills to the [facility] in [place]. Previous prescription sent on 09/06/24 for #75 with no refills.    Last visit 08/08/24  Next visit 11/05/24    Pending your approval

## 2024-10-02 RX ORDER — DEXTROAMPHETAMINE SACCHARATE, AMPHETAMINE ASPARTATE, DEXTROAMPHETAMINE SULFATE AND AMPHETAMINE SULFATE 5; 5; 5; 5 MG/1; MG/1; MG/1; MG/1
TABLET ORAL
Qty: 75 TABLET | Refills: 0 | Status: SHIPPED | OUTPATIENT
Start: 2024-10-02 | End: 2024-10-31

## 2024-10-29 DIAGNOSIS — F90.2 ATTENTION DEFICIT HYPERACTIVITY DISORDER, COMBINED TYPE: ICD-10-CM

## 2024-10-29 RX ORDER — DEXTROAMPHETAMINE SACCHARATE, AMPHETAMINE ASPARTATE, DEXTROAMPHETAMINE SULFATE AND AMPHETAMINE SULFATE 5; 5; 5; 5 MG/1; MG/1; MG/1; MG/1
TABLET ORAL
Qty: 75 TABLET | Refills: 0 | Status: SHIPPED | OUTPATIENT
Start: 2024-10-29 | End: 2024-10-30 | Stop reason: SDUPTHER

## 2024-10-29 NOTE — TELEPHONE ENCOUNTER
Marilynn Anne is requesting a refill on the following medications:  Requested Prescriptions     Pending Prescriptions Disp Refills    amphetamine-dextroamphetamine (ADDERALL, 20MG,) 20 MG tablet 75 tablet 0     Sig: TAKE 1 TABLET IN MORNING AND AT LUNCH AND TAKE A HALF TABLET IN LATE AFTERNOON       Date of last visit: 8/8/2024  Date of next visit (if applicable):11/5/2024  Pharmacy Name: Debi Cardoza MA

## 2024-10-30 DIAGNOSIS — F90.2 ATTENTION DEFICIT HYPERACTIVITY DISORDER, COMBINED TYPE: ICD-10-CM

## 2024-10-30 RX ORDER — DEXTROAMPHETAMINE SACCHARATE, AMPHETAMINE ASPARTATE, DEXTROAMPHETAMINE SULFATE AND AMPHETAMINE SULFATE 5; 5; 5; 5 MG/1; MG/1; MG/1; MG/1
TABLET ORAL
Qty: 75 TABLET | Refills: 0 | Status: SHIPPED | OUTPATIENT
Start: 2024-10-30 | End: 2024-10-31 | Stop reason: SDUPTHER

## 2024-10-30 NOTE — TELEPHONE ENCOUNTER
Rx sent. Please cancel Rx sent to [facility].   Electronically signed by Juliana Iniguez MD on 10/30/2024 at 9:03 AM

## 2024-10-30 NOTE — TELEPHONE ENCOUNTER
Marilynn wrote into the office via Megathread:    Tristin Iniguez & Staff,     I was wondering if you could please call my prescription into [facility] in [place], [place], which is located on [facility] [address] [facility]. Christopher just verified with me that they currently have my medicine in stock and no longer have a shortage! I appreciate tho how [facility] has workers that are better professionally and overall kinder to their customers! I wish this pharmacy was closer to me!:( [place] is a much closer drive to me than [facility]! I do apologize for any inconvenience I may have caused!       Thanks,   Marilynn Anne     Medication is pending your approval for a 30 day supply with 0 refills; the last Rx was sent to [facility] yesterday. Please advise otherwise.

## 2024-10-31 RX ORDER — DEXTROAMPHETAMINE SACCHARATE, AMPHETAMINE ASPARTATE, DEXTROAMPHETAMINE SULFATE AND AMPHETAMINE SULFATE 5; 5; 5; 5 MG/1; MG/1; MG/1; MG/1
TABLET ORAL
Qty: 75 TABLET | Refills: 0 | Status: SHIPPED | OUTPATIENT
Start: 2024-10-31 | End: 2024-11-29

## 2024-10-31 NOTE — TELEPHONE ENCOUNTER
Patient called in asking for her prescription be sent to [facility].      Patient had her Adderall sent to the [facility] and they now are telling her they are out of stock. She called the [facility] and they stated they have it in stock now.    Patient would like the prescription sent back to [facility] in [place]. Patient stated she is sorry for the confusion..

## 2024-11-05 ENCOUNTER — TELEMEDICINE (OUTPATIENT)
Dept: PSYCHIATRY | Age: 34
End: 2024-11-05

## 2024-11-05 DIAGNOSIS — F90.2 ATTENTION DEFICIT HYPERACTIVITY DISORDER, COMBINED TYPE: ICD-10-CM

## 2024-11-05 DIAGNOSIS — G47.00 INSOMNIA, UNSPECIFIED TYPE: ICD-10-CM

## 2024-11-05 DIAGNOSIS — F41.9 ANXIETY: Primary | ICD-10-CM

## 2024-11-05 RX ORDER — DEXTROAMPHETAMINE SACCHARATE, AMPHETAMINE ASPARTATE, DEXTROAMPHETAMINE SULFATE AND AMPHETAMINE SULFATE 5; 5; 5; 5 MG/1; MG/1; MG/1; MG/1
TABLET ORAL
Qty: 75 TABLET | Refills: 0 | Status: SHIPPED | OUTPATIENT
Start: 2024-11-30 | End: 2024-12-30

## 2024-11-05 RX ORDER — CLONIDINE HYDROCHLORIDE 0.1 MG/1
0.1 TABLET ORAL NIGHTLY
Qty: 30 TABLET | Refills: 2 | Status: SHIPPED | OUTPATIENT
Start: 2024-11-05

## 2024-11-05 ASSESSMENT — ANXIETY QUESTIONNAIRES
2. NOT BEING ABLE TO STOP OR CONTROL WORRYING: NOT AT ALL
7. FEELING AFRAID AS IF SOMETHING AWFUL MIGHT HAPPEN: NOT AT ALL
IF YOU CHECKED OFF ANY PROBLEMS ON THIS QUESTIONNAIRE, HOW DIFFICULT HAVE THESE PROBLEMS MADE IT FOR YOU TO DO YOUR WORK, TAKE CARE OF THINGS AT HOME, OR GET ALONG WITH OTHER PEOPLE: NOT DIFFICULT AT ALL
1. FEELING NERVOUS, ANXIOUS, OR ON EDGE: NOT AT ALL
GAD7 TOTAL SCORE: 0
1. FEELING NERVOUS, ANXIOUS, OR ON EDGE: NOT AT ALL
4. TROUBLE RELAXING: NOT AT ALL
3. WORRYING TOO MUCH ABOUT DIFFERENT THINGS: NOT AT ALL
6. BECOMING EASILY ANNOYED OR IRRITABLE: NOT AT ALL
IF YOU CHECKED OFF ANY PROBLEMS ON THIS QUESTIONNAIRE, HOW DIFFICULT HAVE THESE PROBLEMS MADE IT FOR YOU TO DO YOUR WORK, TAKE CARE OF THINGS AT HOME, OR GET ALONG WITH OTHER PEOPLE: NOT DIFFICULT AT ALL
3. WORRYING TOO MUCH ABOUT DIFFERENT THINGS: NOT AT ALL
5. BEING SO RESTLESS THAT IT IS HARD TO SIT STILL: NOT AT ALL
4. TROUBLE RELAXING: NOT AT ALL
2. NOT BEING ABLE TO STOP OR CONTROL WORRYING: NOT AT ALL
7. FEELING AFRAID AS IF SOMETHING AWFUL MIGHT HAPPEN: NOT AT ALL
5. BEING SO RESTLESS THAT IT IS HARD TO SIT STILL: NOT AT ALL
6. BECOMING EASILY ANNOYED OR IRRITABLE: NOT AT ALL

## 2024-11-05 ASSESSMENT — PATIENT HEALTH QUESTIONNAIRE - PHQ9
10. IF YOU CHECKED OFF ANY PROBLEMS, HOW DIFFICULT HAVE THESE PROBLEMS MADE IT FOR YOU TO DO YOUR WORK, TAKE CARE OF THINGS AT HOME, OR GET ALONG WITH OTHER PEOPLE: NOT DIFFICULT AT ALL
SUM OF ALL RESPONSES TO PHQ QUESTIONS 1-9: 0
10. IF YOU CHECKED OFF ANY PROBLEMS, HOW DIFFICULT HAVE THESE PROBLEMS MADE IT FOR YOU TO DO YOUR WORK, TAKE CARE OF THINGS AT HOME, OR GET ALONG WITH OTHER PEOPLE: NOT DIFFICULT AT ALL
SUM OF ALL RESPONSES TO PHQ9 QUESTIONS 1 & 2: 0
3. TROUBLE FALLING OR STAYING ASLEEP: NOT AT ALL
6. FEELING BAD ABOUT YOURSELF - OR THAT YOU ARE A FAILURE OR HAVE LET YOURSELF OR YOUR FAMILY DOWN: NOT AT ALL
8. MOVING OR SPEAKING SO SLOWLY THAT OTHER PEOPLE COULD HAVE NOTICED. OR THE OPPOSITE - BEING SO FIDGETY OR RESTLESS THAT YOU HAVE BEEN MOVING AROUND A LOT MORE THAN USUAL: NOT AT ALL
6. FEELING BAD ABOUT YOURSELF - OR THAT YOU ARE A FAILURE OR HAVE LET YOURSELF OR YOUR FAMILY DOWN: NOT AT ALL
4. FEELING TIRED OR HAVING LITTLE ENERGY: NOT AT ALL
5. POOR APPETITE OR OVEREATING: NOT AT ALL
3. TROUBLE FALLING OR STAYING ASLEEP: NOT AT ALL
SUM OF ALL RESPONSES TO PHQ QUESTIONS 1-9: 0
5. POOR APPETITE OR OVEREATING: NOT AT ALL
7. TROUBLE CONCENTRATING ON THINGS, SUCH AS READING THE NEWSPAPER OR WATCHING TELEVISION: NOT AT ALL
SUM OF ALL RESPONSES TO PHQ QUESTIONS 1-9: 0
9. THOUGHTS THAT YOU WOULD BE BETTER OFF DEAD, OR OF HURTING YOURSELF: NOT AT ALL
2. FEELING DOWN, DEPRESSED OR HOPELESS: NOT AT ALL
2. FEELING DOWN, DEPRESSED OR HOPELESS: NOT AT ALL
1. LITTLE INTEREST OR PLEASURE IN DOING THINGS: NOT AT ALL
SUM OF ALL RESPONSES TO PHQ QUESTIONS 1-9: 0
7. TROUBLE CONCENTRATING ON THINGS, SUCH AS READING THE NEWSPAPER OR WATCHING TELEVISION: NOT AT ALL
4. FEELING TIRED OR HAVING LITTLE ENERGY: NOT AT ALL
9. THOUGHTS THAT YOU WOULD BE BETTER OFF DEAD, OR OF HURTING YOURSELF: NOT AT ALL
1. LITTLE INTEREST OR PLEASURE IN DOING THINGS: NOT AT ALL
8. MOVING OR SPEAKING SO SLOWLY THAT OTHER PEOPLE COULD HAVE NOTICED. OR THE OPPOSITE, BEING SO FIGETY OR RESTLESS THAT YOU HAVE BEEN MOVING AROUND A LOT MORE THAN USUAL: NOT AT ALL
SUM OF ALL RESPONSES TO PHQ QUESTIONS 1-9: 0

## 2024-11-05 NOTE — PROGRESS NOTES
[facility] [place] [facility]  [facility] PSYCHIATRY  [address] [facility] [address] [phone]    Progress Note    Patient:  Marilynn Anne  YOB: 1990  PCP:  Jaleesa Crews, APRN - CNP  Visit Date:  11/5/2024      Chief Complaint   Patient presents with    Follow-up    Medication Check    ADHD    Anxiety    Insomnia       SUBJECTIVE:    Time in: 3:06pm  Time out: 3:47pm  Time spent on documentation: 5 minutes    Marilynn Anne, a 34 y.o. female, presents for a follow up visit.      She presents alone.   Doing well.   Mood \"Good.\"  Waking up earlier. In bed around 11:30p-12a.   Grieves the loss of her dog. Feels like a part of herself is missing.   Loves animals. Fears bugs.   Voted for election noting supporting Nay. Nervous about the results.  Discusses her career path. Notes going to [employer] then [employer]. Majored in Microbiology and changed to Biology. Wonders about going back to school. Dreamed of going to med school.   Met her  after she graduated which changed her plans.   Moved in with her sister in [place]. Was going to attend graduate school. 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   Focus is good with Adderall IR.   No HA or CP.   No SI or psychosis.   Her dad is doing well. He's still living back home noting he's in PT, working on walking again. Her sister is living with her parents now. She doesn't help much. \"My mom brags about her. To make me mad.\" Close to her dad. Her mom is social but to the family she is more negative.   Declines to make any medication changes today.       Med Trials:Paxil (disliked, \"zombie\", nausea), Adderall XR (HA), IR (better on IR), Wellbutrin (helped)       OBJECTIVE:  Vitals: There were no vitals taken for this visit.    MENTAL STATUS EXAM:    GENERAL  Build: Normal    Hygiene:  Appropriate   SENSORIUM Orientation: Place, Person, Time, & Situation     Consciousness: Alert    ATTENTION   Focused   RELATEDNESS

## 2024-12-05 ENCOUNTER — TELEMEDICINE (OUTPATIENT)
Dept: INTERNAL MEDICINE CLINIC | Age: 34
End: 2024-12-05
Payer: COMMERCIAL

## 2024-12-05 DIAGNOSIS — F11.20 SEVERE OPIOID USE DISORDER (HCC): ICD-10-CM

## 2024-12-05 DIAGNOSIS — T65.91XA ALLERGIC REACTION TO CHEMICAL SUBSTANCE, ACCIDENTAL OR UNINTENTIONAL, INITIAL ENCOUNTER: Primary | ICD-10-CM

## 2024-12-05 PROCEDURE — 99214 OFFICE O/P EST MOD 30 MIN: CPT | Performed by: NURSE PRACTITIONER

## 2024-12-05 PROCEDURE — G8427 DOCREV CUR MEDS BY ELIG CLIN: HCPCS | Performed by: NURSE PRACTITIONER

## 2024-12-05 RX ORDER — METHYLPREDNISOLONE 4 MG/1
TABLET ORAL
Qty: 1 KIT | Refills: 0 | Status: SHIPPED | OUTPATIENT
Start: 2024-12-05 | End: 2024-12-11

## 2024-12-05 RX ORDER — BUPRENORPHINE AND NALOXONE 8; 2 MG/1; MG/1
1 FILM, SOLUBLE BUCCAL; SUBLINGUAL 2 TIMES DAILY
Qty: 50 FILM | Refills: 0 | Status: SHIPPED | OUTPATIENT
Start: 2024-12-05 | End: 2024-12-30

## 2024-12-05 SDOH — ECONOMIC STABILITY: FOOD INSECURITY: WITHIN THE PAST 12 MONTHS, YOU WORRIED THAT YOUR FOOD WOULD RUN OUT BEFORE YOU GOT MONEY TO BUY MORE.: NEVER TRUE

## 2024-12-05 SDOH — ECONOMIC STABILITY: FOOD INSECURITY: WITHIN THE PAST 12 MONTHS, THE FOOD YOU BOUGHT JUST DIDN'T LAST AND YOU DIDN'T HAVE MONEY TO GET MORE.: NEVER TRUE

## 2024-12-05 SDOH — ECONOMIC STABILITY: INCOME INSECURITY: HOW HARD IS IT FOR YOU TO PAY FOR THE VERY BASICS LIKE FOOD, HOUSING, MEDICAL CARE, AND HEATING?: NOT HARD AT ALL

## 2024-12-05 ASSESSMENT — ENCOUNTER SYMPTOMS: NAUSEA: 0

## 2024-12-05 NOTE — PROGRESS NOTES
Marilynn Anne, was evaluated through a synchronous (real-time) audio-video encounter. The patient (or guardian if applicable) is aware that this is a billable service, which includes applicable co-pays. This Virtual Visit was conducted with patient's (and/or legal guardian's) consent. Patient identification was verified, and a caregiver was present when appropriate.   The patient was located at Home: [address], [address]  [place] [address]  Provider was located at Facility (Appt Dept): [address]  [address]  [place] [address]  Confirm you are appropriately licensed, registered, or certified to deliver care in the state where the patient is located as indicated above. If you are not or unsure, please re-schedule the visit: Yes, I confirm.     Marilynn Anne (:  1990) is a Established patient, presenting virtually for evaluation of the following: severe opioid use disorder and maintenance, chemical exposure BUE      Below is the assessment and plan developed based on review of pertinent history, physical exam, labs, studies, and medications.     Assessment & Plan  Severe opioid use disorder (HCC)   Chronic, at goal (stable), continue current treatment plan    Orders:    buprenorphine-naloxone (SUBOXONE) 8-2 MG FILM SL film; Place 1 Film under the tongue 2 times daily for 25 days. Max Daily Amount: 2 Film  - OARRS reviewed, no discrepancies  - Narcan at home  - Continue counseling as scheduled  Allergic reaction to chemical substance, accidental or unintentional, initial encounter   Chronic, at goal (stable), continue current treatment plan    Orders:    methylPREDNISolone (MEDROL DOSEPACK) 4 MG tablet; Take by mouth.      Return in about 25 days (around 2024), or at 340 pm.       Subjective     I last seen her 4 weeks ago.     Urges and cravings better controlled with Suboxone 8 mg BID.      She denies any use     Is active in counseling     Hep C negative     Was in [place]

## 2024-12-22 DIAGNOSIS — F90.2 ATTENTION DEFICIT HYPERACTIVITY DISORDER, COMBINED TYPE: ICD-10-CM

## 2024-12-22 RX ORDER — CLONIDINE HYDROCHLORIDE 0.1 MG/1
0.1 TABLET ORAL NIGHTLY
Qty: 30 TABLET | Refills: 2 | Status: CANCELLED | OUTPATIENT
Start: 2024-12-22

## 2024-12-23 RX ORDER — DEXTROAMPHETAMINE SACCHARATE, AMPHETAMINE ASPARTATE, DEXTROAMPHETAMINE SULFATE AND AMPHETAMINE SULFATE 5; 5; 5; 5 MG/1; MG/1; MG/1; MG/1
TABLET ORAL
Qty: 75 TABLET | Refills: 0 | Status: SHIPPED | OUTPATIENT
Start: 2024-12-30 | End: 2025-01-29

## 2024-12-23 NOTE — TELEPHONE ENCOUNTER
Marilynn Omid is requesting a refill on the following medications:  Requested Prescriptions     Pending Prescriptions Disp Refills    amphetamine-dextroamphetamine (ADDERALL, 20MG,) 20 MG tablet 75 tablet 0     Sig: TAKE 1 TABLET IN MORNING AND AT LUNCH AND TAKE A HALF TABLET IN LATE AFTERNOON     *Clonidine still has refills on file at [facility] pharmacy.   Date of last visit: 11/5/2024  Date of next visit (if applicable):1/9/2025  Pharmacy Name: Debi Cardoza MA

## 2025-01-26 DIAGNOSIS — F90.2 ATTENTION DEFICIT HYPERACTIVITY DISORDER, COMBINED TYPE: ICD-10-CM

## 2025-01-27 RX ORDER — DEXTROAMPHETAMINE SACCHARATE, AMPHETAMINE ASPARTATE, DEXTROAMPHETAMINE SULFATE AND AMPHETAMINE SULFATE 5; 5; 5; 5 MG/1; MG/1; MG/1; MG/1
TABLET ORAL
Qty: 75 TABLET | Refills: 0 | Status: SHIPPED | OUTPATIENT
Start: 2025-01-27 | End: 2025-02-26 | Stop reason: SDUPTHER

## 2025-01-27 RX ORDER — CLONIDINE HYDROCHLORIDE 0.1 MG/1
0.1 TABLET ORAL NIGHTLY
Qty: 30 TABLET | Refills: 2 | Status: SHIPPED | OUTPATIENT
Start: 2025-01-27 | End: 2025-02-27

## 2025-01-27 NOTE — TELEPHONE ENCOUNTER
Marilynn Anne is requesting a refill on the following medications:  Requested Prescriptions     Pending Prescriptions Disp Refills    cloNIDine (CATAPRES) 0.1 MG tablet 30 tablet 2     Sig: Take 1 tablet by mouth nightly    amphetamine-dextroamphetamine (ADDERALL, 20MG,) 20 MG tablet 75 tablet 0     Sig: TAKE 1 TABLET IN MORNING AND AT LUNCH AND TAKE A HALF TABLET IN LATE AFTERNOON       Date of last visit: 11/5/2024  Date of next visit (if applicable):2/26/2025  Pharmacy Name: Debi Cardoza MA

## 2025-02-07 DIAGNOSIS — F11.20 SEVERE OPIOID USE DISORDER (HCC): ICD-10-CM

## 2025-02-07 DIAGNOSIS — F90.2 ATTENTION DEFICIT HYPERACTIVITY DISORDER, COMBINED TYPE: ICD-10-CM

## 2025-02-07 RX ORDER — BUPRENORPHINE AND NALOXONE 8; 2 MG/1; MG/1
1 FILM, SOLUBLE BUCCAL; SUBLINGUAL 2 TIMES DAILY
Qty: 8 FILM | Refills: 0 | Status: SHIPPED | OUTPATIENT
Start: 2025-02-07 | End: 2025-02-11

## 2025-02-26 ENCOUNTER — TELEMEDICINE (OUTPATIENT)
Dept: PSYCHIATRY | Age: 35
End: 2025-02-26
Payer: COMMERCIAL

## 2025-02-26 DIAGNOSIS — G47.00 INSOMNIA, UNSPECIFIED TYPE: ICD-10-CM

## 2025-02-26 DIAGNOSIS — F41.9 ANXIETY: ICD-10-CM

## 2025-02-26 DIAGNOSIS — F90.2 ATTENTION DEFICIT HYPERACTIVITY DISORDER, COMBINED TYPE: Primary | ICD-10-CM

## 2025-02-26 PROCEDURE — 99214 OFFICE O/P EST MOD 30 MIN: CPT | Performed by: PSYCHIATRY & NEUROLOGY

## 2025-02-26 PROCEDURE — G8427 DOCREV CUR MEDS BY ELIG CLIN: HCPCS | Performed by: PSYCHIATRY & NEUROLOGY

## 2025-02-26 RX ORDER — GUANFACINE 1 MG/1
1 TABLET ORAL NIGHTLY
Qty: 30 TABLET | Refills: 1 | Status: SHIPPED | OUTPATIENT
Start: 2025-02-26

## 2025-02-26 RX ORDER — DEXTROAMPHETAMINE SACCHARATE, AMPHETAMINE ASPARTATE, DEXTROAMPHETAMINE SULFATE AND AMPHETAMINE SULFATE 5; 5; 5; 5 MG/1; MG/1; MG/1; MG/1
20 TABLET ORAL 3 TIMES DAILY
Qty: 90 TABLET | Refills: 0 | Status: SHIPPED | OUTPATIENT
Start: 2025-02-26 | End: 2025-03-28

## 2025-02-26 ASSESSMENT — ANXIETY QUESTIONNAIRES
3. WORRYING TOO MUCH ABOUT DIFFERENT THINGS: NOT AT ALL
7. FEELING AFRAID AS IF SOMETHING AWFUL MIGHT HAPPEN: NOT AT ALL
7. FEELING AFRAID AS IF SOMETHING AWFUL MIGHT HAPPEN: NOT AT ALL
2. NOT BEING ABLE TO STOP OR CONTROL WORRYING: NOT AT ALL
4. TROUBLE RELAXING: NOT AT ALL
IF YOU CHECKED OFF ANY PROBLEMS ON THIS QUESTIONNAIRE, HOW DIFFICULT HAVE THESE PROBLEMS MADE IT FOR YOU TO DO YOUR WORK, TAKE CARE OF THINGS AT HOME, OR GET ALONG WITH OTHER PEOPLE: NOT DIFFICULT AT ALL
5. BEING SO RESTLESS THAT IT IS HARD TO SIT STILL: NOT AT ALL
1. FEELING NERVOUS, ANXIOUS, OR ON EDGE: NOT AT ALL
6. BECOMING EASILY ANNOYED OR IRRITABLE: NOT AT ALL
1. FEELING NERVOUS, ANXIOUS, OR ON EDGE: NOT AT ALL
GAD7 TOTAL SCORE: 0
6. BECOMING EASILY ANNOYED OR IRRITABLE: NOT AT ALL
2. NOT BEING ABLE TO STOP OR CONTROL WORRYING: NOT AT ALL
IF YOU CHECKED OFF ANY PROBLEMS ON THIS QUESTIONNAIRE, HOW DIFFICULT HAVE THESE PROBLEMS MADE IT FOR YOU TO DO YOUR WORK, TAKE CARE OF THINGS AT HOME, OR GET ALONG WITH OTHER PEOPLE: NOT DIFFICULT AT ALL
5. BEING SO RESTLESS THAT IT IS HARD TO SIT STILL: NOT AT ALL
3. WORRYING TOO MUCH ABOUT DIFFERENT THINGS: NOT AT ALL
4. TROUBLE RELAXING: NOT AT ALL

## 2025-02-26 ASSESSMENT — PATIENT HEALTH QUESTIONNAIRE - PHQ9
4. FEELING TIRED OR HAVING LITTLE ENERGY: NOT AT ALL
SUM OF ALL RESPONSES TO PHQ9 QUESTIONS 1 & 2: 0
8. MOVING OR SPEAKING SO SLOWLY THAT OTHER PEOPLE COULD HAVE NOTICED. OR THE OPPOSITE - BEING SO FIDGETY OR RESTLESS THAT YOU HAVE BEEN MOVING AROUND A LOT MORE THAN USUAL: NOT AT ALL
1. LITTLE INTEREST OR PLEASURE IN DOING THINGS: NOT AT ALL
5. POOR APPETITE OR OVEREATING: NOT AT ALL
10. IF YOU CHECKED OFF ANY PROBLEMS, HOW DIFFICULT HAVE THESE PROBLEMS MADE IT FOR YOU TO DO YOUR WORK, TAKE CARE OF THINGS AT HOME, OR GET ALONG WITH OTHER PEOPLE: NOT DIFFICULT AT ALL
1. LITTLE INTEREST OR PLEASURE IN DOING THINGS: NOT AT ALL
3. TROUBLE FALLING OR STAYING ASLEEP: NOT AT ALL
6. FEELING BAD ABOUT YOURSELF - OR THAT YOU ARE A FAILURE OR HAVE LET YOURSELF OR YOUR FAMILY DOWN: NOT AT ALL
6. FEELING BAD ABOUT YOURSELF - OR THAT YOU ARE A FAILURE OR HAVE LET YOURSELF OR YOUR FAMILY DOWN: NOT AT ALL
2. FEELING DOWN, DEPRESSED OR HOPELESS: NOT AT ALL
SUM OF ALL RESPONSES TO PHQ QUESTIONS 1-9: 0
8. MOVING OR SPEAKING SO SLOWLY THAT OTHER PEOPLE COULD HAVE NOTICED. OR THE OPPOSITE, BEING SO FIGETY OR RESTLESS THAT YOU HAVE BEEN MOVING AROUND A LOT MORE THAN USUAL: NOT AT ALL
2. FEELING DOWN, DEPRESSED OR HOPELESS: NOT AT ALL
9. THOUGHTS THAT YOU WOULD BE BETTER OFF DEAD, OR OF HURTING YOURSELF: NOT AT ALL
9. THOUGHTS THAT YOU WOULD BE BETTER OFF DEAD, OR OF HURTING YOURSELF: NOT AT ALL
7. TROUBLE CONCENTRATING ON THINGS, SUCH AS READING THE NEWSPAPER OR WATCHING TELEVISION: NOT AT ALL
10. IF YOU CHECKED OFF ANY PROBLEMS, HOW DIFFICULT HAVE THESE PROBLEMS MADE IT FOR YOU TO DO YOUR WORK, TAKE CARE OF THINGS AT HOME, OR GET ALONG WITH OTHER PEOPLE: NOT DIFFICULT AT ALL
7. TROUBLE CONCENTRATING ON THINGS, SUCH AS READING THE NEWSPAPER OR WATCHING TELEVISION: NOT AT ALL
3. TROUBLE FALLING OR STAYING ASLEEP: NOT AT ALL
5. POOR APPETITE OR OVEREATING: NOT AT ALL
4. FEELING TIRED OR HAVING LITTLE ENERGY: NOT AT ALL

## 2025-02-26 NOTE — PROGRESS NOTES
[facility] [place] [facility]  [facility] PSYCH[facility]  [address] [address] [phone]    Progress Note    Patient:  Marilynn Anne  YOB: 1990  PCP:  Jaleesa Crews, APRN - CNP  Visit Date:  2/26/2025      Chief Complaint   Patient presents with    Follow-up    Medication Check    ADHD    Insomnia    Anxiety       SUBJECTIVE:    Time in: 3:39pm  Time out: 4:10pm  Time spent on documentation: 5 minutes    Marilynn Anne, a 34 y.o. female, presents for a follow up visit.      She presents alone.   Discusses shopping for her daughter. Raising her in a modern world. Dealing with her electronic devices.   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   Sleep is okay, not getting as much sleep as she usually would (7-8 hrs) .   Using clonidine regularly. Feels a bit tired in the morning. Getting about 5 hours sleep then awake. Maybe sleeping more soundly through a shorter time.   More trouble with focus around 6:30pm noting 3rd dose wearing off quickly.   Thoughts become more disorganized, racing. More trouble functioning through the rest of the day.  Mood is good, stable. Anxiety is under control.   Appetite and energy are good.   No SI or psychosis.  Discussed tx options and we agree to increase Adderall later in the day for focus. Will change clonidine to guanfacine to see if she prefers that for her sleep/ADHD.       Med Trials:Paxil (disliked, \"zombie\", nausea), Adderall XR (HA), IR (better on IR), Wellbutrin (helped), clonidine       OBJECTIVE:  Vitals: There were no vitals taken for this visit.    MENTAL STATUS EXAM:    GENERAL  Build: Normal    Hygiene:  Appropriate in casual dress   SENSORIUM Orientation: Place, Person, Time, & Situation     Consciousness: Alert    ATTENTION   Focused   RELATEDNESS  Cooperative    EYE CONTACT   Good    PSYCHOMOTOR  Normal    SPEECH Volume: Normal     Rate: Normal rate and tone    Amplitude:

## 2025-02-28 ENCOUNTER — NURSE ONLY (OUTPATIENT)
Dept: INTERNAL MEDICINE CLINIC | Age: 35
End: 2025-02-28
Payer: COMMERCIAL

## 2025-02-28 DIAGNOSIS — F11.20 SEVERE OPIOID USE DISORDER (HCC): Primary | ICD-10-CM

## 2025-02-28 PROCEDURE — 80305 DRUG TEST PRSMV DIR OPT OBS: CPT | Performed by: NURSE PRACTITIONER

## 2025-02-28 RX ORDER — BUPRENORPHINE AND NALOXONE 8; 2 MG/1; MG/1
1 FILM, SOLUBLE BUCCAL; SUBLINGUAL 2 TIMES DAILY
Qty: 14 FILM | Refills: 0 | Status: SHIPPED | OUTPATIENT
Start: 2025-02-28 | End: 2025-03-07

## 2025-03-24 DIAGNOSIS — F90.2 ATTENTION DEFICIT HYPERACTIVITY DISORDER, COMBINED TYPE: ICD-10-CM

## 2025-03-24 RX ORDER — KETOCONAZOLE 20 MG/ML
SHAMPOO, SUSPENSION TOPICAL
Qty: 120 ML | Refills: 2 | Status: SHIPPED | OUTPATIENT
Start: 2025-03-24

## 2025-03-24 RX ORDER — GUANFACINE 1 MG/1
1 TABLET ORAL NIGHTLY
Qty: 30 TABLET | Refills: 1 | OUTPATIENT
Start: 2025-03-24

## 2025-03-24 NOTE — TELEPHONE ENCOUNTER
Marilynn Anne is requesting a refill on the following medications:  Requested Prescriptions     Pending Prescriptions Disp Refills    amphetamine-dextroamphetamine (ADDERALL, 20MG,) 20 MG tablet 90 tablet 0     Sig: Take 1 tablet by mouth 3 times daily for 30 days. Max Daily Amount: 60 mg       Date of last visit: 2/26/2025  Date of next visit (if applicable):3/24/2025  Pharmacy Name: Debi Cardoza MA

## 2025-03-25 RX ORDER — DEXTROAMPHETAMINE SACCHARATE, AMPHETAMINE ASPARTATE, DEXTROAMPHETAMINE SULFATE AND AMPHETAMINE SULFATE 5; 5; 5; 5 MG/1; MG/1; MG/1; MG/1
20 TABLET ORAL 3 TIMES DAILY
Qty: 90 TABLET | Refills: 0 | Status: SHIPPED | OUTPATIENT
Start: 2025-03-25 | End: 2025-04-24

## 2025-04-01 ENCOUNTER — OFFICE VISIT (OUTPATIENT)
Dept: INTERNAL MEDICINE CLINIC | Age: 35
End: 2025-04-01
Payer: COMMERCIAL

## 2025-04-01 DIAGNOSIS — F11.20 SEVERE OPIOID USE DISORDER: Primary | ICD-10-CM

## 2025-04-01 PROCEDURE — 80305 DRUG TEST PRSMV DIR OPT OBS: CPT | Performed by: NURSE PRACTITIONER

## 2025-04-01 PROCEDURE — G8428 CUR MEDS NOT DOCUMENT: HCPCS | Performed by: NURSE PRACTITIONER

## 2025-04-01 PROCEDURE — G8420 CALC BMI NORM PARAMETERS: HCPCS | Performed by: NURSE PRACTITIONER

## 2025-04-01 PROCEDURE — 99211 OFF/OP EST MAY X REQ PHY/QHP: CPT | Performed by: NURSE PRACTITIONER

## 2025-04-01 RX ORDER — BUPRENORPHINE AND NALOXONE 8; 2 MG/1; MG/1
1 FILM, SOLUBLE BUCCAL; SUBLINGUAL 2 TIMES DAILY
Qty: 14 FILM | Refills: 0 | Status: SHIPPED | OUTPATIENT
Start: 2025-04-01 | End: 2025-04-08

## 2025-04-01 RX ORDER — BUPRENORPHINE AND NALOXONE 8; 2 MG/1; MG/1
1 FILM, SOLUBLE BUCCAL; SUBLINGUAL 2 TIMES DAILY
Qty: 14 FILM | Refills: 0 | Status: SHIPPED | OUTPATIENT
Start: 2025-04-01 | End: 2025-04-01

## 2025-04-09 ENCOUNTER — TELEMEDICINE (OUTPATIENT)
Dept: PSYCHIATRY | Age: 35
End: 2025-04-09

## 2025-04-09 DIAGNOSIS — F41.9 ANXIETY: ICD-10-CM

## 2025-04-09 DIAGNOSIS — F90.2 ATTENTION DEFICIT HYPERACTIVITY DISORDER, COMBINED TYPE: Primary | ICD-10-CM

## 2025-04-09 DIAGNOSIS — G47.00 INSOMNIA, UNSPECIFIED TYPE: ICD-10-CM

## 2025-04-09 RX ORDER — DEXTROAMPHETAMINE SACCHARATE, AMPHETAMINE ASPARTATE, DEXTROAMPHETAMINE SULFATE AND AMPHETAMINE SULFATE 5; 5; 5; 5 MG/1; MG/1; MG/1; MG/1
20 TABLET ORAL 3 TIMES DAILY
Qty: 90 TABLET | Refills: 0 | Status: SHIPPED | OUTPATIENT
Start: 2025-04-24 | End: 2025-05-24

## 2025-04-09 RX ORDER — GUANFACINE 1 MG/1
1 TABLET ORAL NIGHTLY
Qty: 30 TABLET | Refills: 2 | Status: SHIPPED | OUTPATIENT
Start: 2025-04-09

## 2025-04-09 ASSESSMENT — ANXIETY QUESTIONNAIRES
5. BEING SO RESTLESS THAT IT IS HARD TO SIT STILL: NOT AT ALL
4. TROUBLE RELAXING: NOT AT ALL
2. NOT BEING ABLE TO STOP OR CONTROL WORRYING: NOT AT ALL
6. BECOMING EASILY ANNOYED OR IRRITABLE: NOT AT ALL
6. BECOMING EASILY ANNOYED OR IRRITABLE: NOT AT ALL
7. FEELING AFRAID AS IF SOMETHING AWFUL MIGHT HAPPEN: NOT AT ALL
1. FEELING NERVOUS, ANXIOUS, OR ON EDGE: NOT AT ALL
3. WORRYING TOO MUCH ABOUT DIFFERENT THINGS: NOT AT ALL
1. FEELING NERVOUS, ANXIOUS, OR ON EDGE: NOT AT ALL
3. WORRYING TOO MUCH ABOUT DIFFERENT THINGS: NOT AT ALL
IF YOU CHECKED OFF ANY PROBLEMS ON THIS QUESTIONNAIRE, HOW DIFFICULT HAVE THESE PROBLEMS MADE IT FOR YOU TO DO YOUR WORK, TAKE CARE OF THINGS AT HOME, OR GET ALONG WITH OTHER PEOPLE: NOT DIFFICULT AT ALL
2. NOT BEING ABLE TO STOP OR CONTROL WORRYING: NOT AT ALL
IF YOU CHECKED OFF ANY PROBLEMS ON THIS QUESTIONNAIRE, HOW DIFFICULT HAVE THESE PROBLEMS MADE IT FOR YOU TO DO YOUR WORK, TAKE CARE OF THINGS AT HOME, OR GET ALONG WITH OTHER PEOPLE: NOT DIFFICULT AT ALL
7. FEELING AFRAID AS IF SOMETHING AWFUL MIGHT HAPPEN: NOT AT ALL
GAD7 TOTAL SCORE: 0
5. BEING SO RESTLESS THAT IT IS HARD TO SIT STILL: NOT AT ALL
4. TROUBLE RELAXING: NOT AT ALL

## 2025-04-09 ASSESSMENT — PATIENT HEALTH QUESTIONNAIRE - PHQ9
10. IF YOU CHECKED OFF ANY PROBLEMS, HOW DIFFICULT HAVE THESE PROBLEMS MADE IT FOR YOU TO DO YOUR WORK, TAKE CARE OF THINGS AT HOME, OR GET ALONG WITH OTHER PEOPLE: NOT DIFFICULT AT ALL
2. FEELING DOWN, DEPRESSED OR HOPELESS: NOT AT ALL
9. THOUGHTS THAT YOU WOULD BE BETTER OFF DEAD, OR OF HURTING YOURSELF: NOT AT ALL
7. TROUBLE CONCENTRATING ON THINGS, SUCH AS READING THE NEWSPAPER OR WATCHING TELEVISION: NOT AT ALL
8. MOVING OR SPEAKING SO SLOWLY THAT OTHER PEOPLE COULD HAVE NOTICED. OR THE OPPOSITE - BEING SO FIDGETY OR RESTLESS THAT YOU HAVE BEEN MOVING AROUND A LOT MORE THAN USUAL: NOT AT ALL
4. FEELING TIRED OR HAVING LITTLE ENERGY: NOT AT ALL
SUM OF ALL RESPONSES TO PHQ QUESTIONS 1-9: 0
3. TROUBLE FALLING OR STAYING ASLEEP: NOT AT ALL
10. IF YOU CHECKED OFF ANY PROBLEMS, HOW DIFFICULT HAVE THESE PROBLEMS MADE IT FOR YOU TO DO YOUR WORK, TAKE CARE OF THINGS AT HOME, OR GET ALONG WITH OTHER PEOPLE: NOT DIFFICULT AT ALL
SUM OF ALL RESPONSES TO PHQ QUESTIONS 1-9: 0
1. LITTLE INTEREST OR PLEASURE IN DOING THINGS: NOT AT ALL
1. LITTLE INTEREST OR PLEASURE IN DOING THINGS: NOT AT ALL
6. FEELING BAD ABOUT YOURSELF - OR THAT YOU ARE A FAILURE OR HAVE LET YOURSELF OR YOUR FAMILY DOWN: NOT AT ALL
7. TROUBLE CONCENTRATING ON THINGS, SUCH AS READING THE NEWSPAPER OR WATCHING TELEVISION: NOT AT ALL
6. FEELING BAD ABOUT YOURSELF - OR THAT YOU ARE A FAILURE OR HAVE LET YOURSELF OR YOUR FAMILY DOWN: NOT AT ALL
2. FEELING DOWN, DEPRESSED OR HOPELESS: NOT AT ALL
5. POOR APPETITE OR OVEREATING: NOT AT ALL
9. THOUGHTS THAT YOU WOULD BE BETTER OFF DEAD, OR OF HURTING YOURSELF: NOT AT ALL
4. FEELING TIRED OR HAVING LITTLE ENERGY: NOT AT ALL
3. TROUBLE FALLING OR STAYING ASLEEP: NOT AT ALL
5. POOR APPETITE OR OVEREATING: NOT AT ALL
8. MOVING OR SPEAKING SO SLOWLY THAT OTHER PEOPLE COULD HAVE NOTICED. OR THE OPPOSITE, BEING SO FIGETY OR RESTLESS THAT YOU HAVE BEEN MOVING AROUND A LOT MORE THAN USUAL: NOT AT ALL
SUM OF ALL RESPONSES TO PHQ QUESTIONS 1-9: 0

## 2025-04-09 NOTE — PROGRESS NOTES
your work, take care of things at home, or get along with other people? Not difficult at all Not difficult at all Not difficult at all       Patient-reported       Mobility/Gait: Independently     Controlled SubstancesMonitoring: Periodic Controlled Substance Monitoring: No signs of potential drug abuse or diversion identified., Possible medication side effects, risk of tolerance/dependence & alternative treatments discussed. (Juliana Iniguez MD)       ASSESSMENT: No changes today.    ADHD confirmed on NPT.    Diagnosis Orders   1. Attention deficit hyperactivity disorder, combined type  amphetamine-dextroamphetamine (ADDERALL, 20MG,) 20 MG tablet      2. Insomnia, unspecified type        3. Anxiety        R/o LESA      PLAN:     Medications:   Tenex 1 mg nightly  Adderall IR 20 mg TID  Therapy: none  Labs/Tests/Imaging: none  Records Reviewed: [facility]  Patient advised to call if patient has any difficulties with treatment  Return in about 4 weeks (around 5/7/2025) for med check.       Marilynn Anne, was evaluated through a synchronous (real-time) audio-video encounter. The patient (or guardian if applicable) is aware that this is a billable service, which includes applicable co-pays. This Virtual Visit was conducted with patient's (and/or legal guardian's) consent. Patient identification was verified, and a caregiver was present when appropriate.   The patient was located at Home: [address] [place] [address], [address]  [place] [address]  Provider was located at Home (Appt Dept State): [place]       Total time spent for this encounter:  44 minutes    --Juliana Iniguez MD on 4/11/2025 at 7:31 AM    An electronic signature was used to authenticate this note.

## 2025-05-09 ENCOUNTER — OFFICE VISIT (OUTPATIENT)
Dept: INTERNAL MEDICINE CLINIC | Age: 35
End: 2025-05-09
Payer: COMMERCIAL

## 2025-05-09 VITALS
WEIGHT: 124 LBS | DIASTOLIC BLOOD PRESSURE: 84 MMHG | SYSTOLIC BLOOD PRESSURE: 126 MMHG | HEART RATE: 133 BPM | RESPIRATION RATE: 18 BRPM | BODY MASS INDEX: 20.01 KG/M2

## 2025-05-09 DIAGNOSIS — F11.20 SEVERE OPIOID USE DISORDER (HCC): Primary | ICD-10-CM

## 2025-05-09 DIAGNOSIS — F11.20 SEVERE OPIOID USE DISORDER ON MAINTENANCE THERAPY (HCC): ICD-10-CM

## 2025-05-09 PROCEDURE — 4004F PT TOBACCO SCREEN RCVD TLK: CPT | Performed by: NURSE PRACTITIONER

## 2025-05-09 PROCEDURE — 99214 OFFICE O/P EST MOD 30 MIN: CPT | Performed by: NURSE PRACTITIONER

## 2025-05-09 PROCEDURE — G8420 CALC BMI NORM PARAMETERS: HCPCS | Performed by: NURSE PRACTITIONER

## 2025-05-09 PROCEDURE — 80305 DRUG TEST PRSMV DIR OPT OBS: CPT | Performed by: NURSE PRACTITIONER

## 2025-05-09 PROCEDURE — G8427 DOCREV CUR MEDS BY ELIG CLIN: HCPCS | Performed by: NURSE PRACTITIONER

## 2025-05-09 RX ORDER — BUPRENORPHINE AND NALOXONE 8; 2 MG/1; MG/1
1 FILM, SOLUBLE BUCCAL; SUBLINGUAL 2 TIMES DAILY
Qty: 56 FILM | Refills: 0 | Status: SHIPPED | OUTPATIENT
Start: 2025-05-09 | End: 2025-06-06

## 2025-05-09 NOTE — PROGRESS NOTES
[facility] PROFESSIONAL [facility] INTERNAL MEDICINE AND MEDICATION MANAGEMENT  [address]  [address]  [place] [id number]  Dept: [phone]  Dept Fax: [phone]  Loc: [phone]     Visit Date:  5/9/2025    Patient:  Marilynn Anne  YOB: 1990    HPI:     Chief Complaint   Patient presents with    Addiction Problem     Marilynn presents today for medical evaluation of severe opioid use disorder and maintenance      I last seen her 4 weeks ago.     Urges and cravings better controlled with Suboxone 8 mg BID.      Urine positive for amphetamines and buprenorphine      She denies any use     Is active in counseling     Hep C negative     Stopped taking wellbutrin- does not feel better on it- learned to cope with stress  Adderall has helped her stay focused     Medications    Current Outpatient Medications:     buprenorphine-naloxone (SUBOXONE) 8-2 MG FILM SL film, Place 1 Film under the tongue 2 times daily for 28 days. Max Daily Amount: 2 Film, Disp: 56 Film, Rfl: 0    ketoconazole (NIZORAL) 2 % shampoo, Apply topically daily as needed., Disp: 120 mL, Rfl: 2    amphetamine-dextroamphetamine (ADDERALL, 20MG,) 20 MG tablet, Take 1 tablet by mouth 3 times daily for 30 days. Max Daily Amount: 60 mg, Disp: 90 tablet, Rfl: 0    cloNIDine (CATAPRES) 0.1 MG tablet, Take 1 tablet by mouth nightly, Disp: 30 tablet, Rfl: 2    The patient has no known allergies.    Past Medical History  Marilynn  has a past medical history of MRSA (methicillin resistant staph aureus) culture positive.    Past Surgical History  The patient  has a past surgical history that includes Ankle surgery.    Family History  This patient's family history includes Cancer in her paternal grandmother; Emphysema in her maternal grandmother; Heart Attack in her paternal grandfather; Seizures in her father.    Social History  Marilynn  reports that she has been smoking cigarettes. She has a 0.3 pack-year

## 2025-05-14 ENCOUNTER — TELEMEDICINE (OUTPATIENT)
Dept: PSYCHIATRY | Age: 35
End: 2025-05-14

## 2025-05-14 DIAGNOSIS — F90.8 ADHD, ADULT RESIDUAL TYPE: Primary | ICD-10-CM

## 2025-05-14 DIAGNOSIS — G47.00 INSOMNIA, UNSPECIFIED TYPE: ICD-10-CM

## 2025-05-14 DIAGNOSIS — F41.9 ANXIETY: ICD-10-CM

## 2025-05-14 RX ORDER — CLONIDINE HYDROCHLORIDE 0.1 MG/1
0.1 TABLET ORAL NIGHTLY
Qty: 30 TABLET | Refills: 2 | Status: SHIPPED | OUTPATIENT
Start: 2025-05-14

## 2025-05-14 RX ORDER — DEXTROAMPHETAMINE SACCHARATE, AMPHETAMINE ASPARTATE, DEXTROAMPHETAMINE SULFATE AND AMPHETAMINE SULFATE 5; 5; 5; 5 MG/1; MG/1; MG/1; MG/1
20 TABLET ORAL 3 TIMES DAILY
Qty: 90 TABLET | Refills: 0 | Status: SHIPPED | OUTPATIENT
Start: 2025-05-14 | End: 2025-06-13

## 2025-05-14 ASSESSMENT — PATIENT HEALTH QUESTIONNAIRE - PHQ9
6. FEELING BAD ABOUT YOURSELF - OR THAT YOU ARE A FAILURE OR HAVE LET YOURSELF OR YOUR FAMILY DOWN: NOT AT ALL
2. FEELING DOWN, DEPRESSED OR HOPELESS: NOT AT ALL
1. LITTLE INTEREST OR PLEASURE IN DOING THINGS: NOT AT ALL
9. THOUGHTS THAT YOU WOULD BE BETTER OFF DEAD, OR OF HURTING YOURSELF: NOT AT ALL
7. TROUBLE CONCENTRATING ON THINGS, SUCH AS READING THE NEWSPAPER OR WATCHING TELEVISION: NOT AT ALL
3. TROUBLE FALLING OR STAYING ASLEEP: NOT AT ALL
SUM OF ALL RESPONSES TO PHQ QUESTIONS 1-9: 0
6. FEELING BAD ABOUT YOURSELF - OR THAT YOU ARE A FAILURE OR HAVE LET YOURSELF OR YOUR FAMILY DOWN: NOT AT ALL
10. IF YOU CHECKED OFF ANY PROBLEMS, HOW DIFFICULT HAVE THESE PROBLEMS MADE IT FOR YOU TO DO YOUR WORK, TAKE CARE OF THINGS AT HOME, OR GET ALONG WITH OTHER PEOPLE: NOT DIFFICULT AT ALL
4. FEELING TIRED OR HAVING LITTLE ENERGY: NOT AT ALL
5. POOR APPETITE OR OVEREATING: NOT AT ALL
8. MOVING OR SPEAKING SO SLOWLY THAT OTHER PEOPLE COULD HAVE NOTICED. OR THE OPPOSITE - BEING SO FIDGETY OR RESTLESS THAT YOU HAVE BEEN MOVING AROUND A LOT MORE THAN USUAL: NOT AT ALL
SUM OF ALL RESPONSES TO PHQ QUESTIONS 1-9: 0
SUM OF ALL RESPONSES TO PHQ QUESTIONS 1-9: 0
9. THOUGHTS THAT YOU WOULD BE BETTER OFF DEAD, OR OF HURTING YOURSELF: NOT AT ALL
5. POOR APPETITE OR OVEREATING: NOT AT ALL
3. TROUBLE FALLING OR STAYING ASLEEP: NOT AT ALL
2. FEELING DOWN, DEPRESSED OR HOPELESS: NOT AT ALL
SUM OF ALL RESPONSES TO PHQ QUESTIONS 1-9: 0
10. IF YOU CHECKED OFF ANY PROBLEMS, HOW DIFFICULT HAVE THESE PROBLEMS MADE IT FOR YOU TO DO YOUR WORK, TAKE CARE OF THINGS AT HOME, OR GET ALONG WITH OTHER PEOPLE: NOT DIFFICULT AT ALL
4. FEELING TIRED OR HAVING LITTLE ENERGY: NOT AT ALL
8. MOVING OR SPEAKING SO SLOWLY THAT OTHER PEOPLE COULD HAVE NOTICED. OR THE OPPOSITE, BEING SO FIGETY OR RESTLESS THAT YOU HAVE BEEN MOVING AROUND A LOT MORE THAN USUAL: NOT AT ALL
SUM OF ALL RESPONSES TO PHQ QUESTIONS 1-9: 0
7. TROUBLE CONCENTRATING ON THINGS, SUCH AS READING THE NEWSPAPER OR WATCHING TELEVISION: NOT AT ALL
1. LITTLE INTEREST OR PLEASURE IN DOING THINGS: NOT AT ALL

## 2025-05-14 ASSESSMENT — ANXIETY QUESTIONNAIRES
7. FEELING AFRAID AS IF SOMETHING AWFUL MIGHT HAPPEN: NOT AT ALL
1. FEELING NERVOUS, ANXIOUS, OR ON EDGE: NOT AT ALL
2. NOT BEING ABLE TO STOP OR CONTROL WORRYING: NOT AT ALL
6. BECOMING EASILY ANNOYED OR IRRITABLE: NOT AT ALL
IF YOU CHECKED OFF ANY PROBLEMS ON THIS QUESTIONNAIRE, HOW DIFFICULT HAVE THESE PROBLEMS MADE IT FOR YOU TO DO YOUR WORK, TAKE CARE OF THINGS AT HOME, OR GET ALONG WITH OTHER PEOPLE: NOT DIFFICULT AT ALL
6. BECOMING EASILY ANNOYED OR IRRITABLE: NOT AT ALL
GAD7 TOTAL SCORE: 0
7. FEELING AFRAID AS IF SOMETHING AWFUL MIGHT HAPPEN: NOT AT ALL
5. BEING SO RESTLESS THAT IT IS HARD TO SIT STILL: NOT AT ALL
3. WORRYING TOO MUCH ABOUT DIFFERENT THINGS: NOT AT ALL
3. WORRYING TOO MUCH ABOUT DIFFERENT THINGS: NOT AT ALL
IF YOU CHECKED OFF ANY PROBLEMS ON THIS QUESTIONNAIRE, HOW DIFFICULT HAVE THESE PROBLEMS MADE IT FOR YOU TO DO YOUR WORK, TAKE CARE OF THINGS AT HOME, OR GET ALONG WITH OTHER PEOPLE: NOT DIFFICULT AT ALL
5. BEING SO RESTLESS THAT IT IS HARD TO SIT STILL: NOT AT ALL
4. TROUBLE RELAXING: NOT AT ALL
1. FEELING NERVOUS, ANXIOUS, OR ON EDGE: NOT AT ALL
2. NOT BEING ABLE TO STOP OR CONTROL WORRYING: NOT AT ALL
4. TROUBLE RELAXING: NOT AT ALL

## 2025-05-14 NOTE — PROGRESS NOTES
easily annoyed or irritable Not at all Not at all Not at all   Feeling afraid as if something awful might happen Not at all Not at all Not at all   LESA-7 Total Score 0  0  0    How difficult have these problems made it for you to do your work, take care of things at home, or get along with other people? Not difficult at all Not difficult at all Not difficult at all       Patient-reported       Mobility/Gait: Independently     Controlled SubstancesMonitoring: Periodic Controlled Substance Monitoring: Possible medication side effects, risk of tolerance/dependence & alternative treatments discussed., No signs of potential drug abuse or diversion identified. (Juliana Iniguez MD)       ASSESSMENT: Will change Tenex back to clonidine for sleep/ADHD.    ADHD confirmed on NPT.    Diagnosis Orders   1. ADHD, adult residual type        2. Insomnia, unspecified type        3. Anxiety        R/o LESA      PLAN:     Medications:   Stop Tenex 1 mg nightly  Start clonidine 0.1 mg QHS  Adderall IR 20 mg TID  Therapy: none  Labs/Tests/Imaging: none  Records Reviewed: [facility]  Patient advised to call if patient has any difficulties with treatment  Return in about 2 months (around 7/14/2025) for med check.       Marilynn Anne, was evaluated through a synchronous (real-time) audio-video encounter. The patient (or guardian if applicable) is aware that this is a billable service, which includes applicable co-pays. This Virtual Visit was conducted with patient's (and/or legal guardian's) consent. Patient identification was verified, and a caregiver was present when appropriate.   The patient was located at Home: [address] [place] [address], [address]  [place] [address]  Provider was located at Home (Appt Dept State): [place]       Total time spent for this encounter:  42 minutes    --Juliana Iniguez MD on 5/15/2025 at 7:48 AM    An electronic signature was used to authenticate this note.

## 2025-05-19 ASSESSMENT — ENCOUNTER SYMPTOMS: NAUSEA: 0

## 2025-06-13 DIAGNOSIS — F90.8 ADHD, ADULT RESIDUAL TYPE: Primary | ICD-10-CM

## 2025-06-13 RX ORDER — CLONIDINE HYDROCHLORIDE 0.1 MG/1
0.1 TABLET ORAL NIGHTLY
Qty: 30 TABLET | Refills: 2 | Status: CANCELLED | OUTPATIENT
Start: 2025-06-13

## 2025-06-13 NOTE — TELEPHONE ENCOUNTER
Marilynn Anne is requesting a refill on the following medications:  Requested Prescriptions     Pending Prescriptions Disp Refills    amphetamine-dextroamphetamine (ADDERALL, 20MG,) 20 MG tablet 90 tablet 0     Sig: Take 1 tablet by mouth 3 times daily for 30 days. Max Daily Amount: 60 mg    cloNIDine (CATAPRES) 0.1 MG tablet 30 tablet 2     Sig: Take 1 tablet by mouth nightly     *Previously used [facility] pharmacy for Clonidine.   Date of last visit: 5/14/2025  Date of next visit (if applicable):7/17/2025  Date Rx last sent by provider: 05/14/2025  Pharmacy Name: [facility]

## 2025-06-15 RX ORDER — DEXTROAMPHETAMINE SACCHARATE, AMPHETAMINE ASPARTATE, DEXTROAMPHETAMINE SULFATE AND AMPHETAMINE SULFATE 5; 5; 5; 5 MG/1; MG/1; MG/1; MG/1
20 TABLET ORAL 3 TIMES DAILY
Qty: 90 TABLET | Refills: 0 | Status: SHIPPED | OUTPATIENT
Start: 2025-06-21 | End: 2025-07-21

## 2025-06-15 RX ORDER — CLONIDINE HYDROCHLORIDE 0.1 MG/1
0.1 TABLET ORAL NIGHTLY
Qty: 30 TABLET | Refills: 2 | Status: SHIPPED | OUTPATIENT
Start: 2025-06-15

## 2025-07-17 ENCOUNTER — TELEMEDICINE (OUTPATIENT)
Dept: PSYCHIATRY | Age: 35
End: 2025-07-17
Payer: COMMERCIAL

## 2025-07-17 DIAGNOSIS — F90.8 ADHD, ADULT RESIDUAL TYPE: Primary | ICD-10-CM

## 2025-07-17 DIAGNOSIS — F41.9 ANXIETY: ICD-10-CM

## 2025-07-17 DIAGNOSIS — G47.00 INSOMNIA, UNSPECIFIED TYPE: ICD-10-CM

## 2025-07-17 PROCEDURE — 99213 OFFICE O/P EST LOW 20 MIN: CPT | Performed by: PSYCHIATRY & NEUROLOGY

## 2025-07-17 PROCEDURE — 90833 PSYTX W PT W E/M 30 MIN: CPT | Performed by: PSYCHIATRY & NEUROLOGY

## 2025-07-17 PROCEDURE — G8427 DOCREV CUR MEDS BY ELIG CLIN: HCPCS | Performed by: PSYCHIATRY & NEUROLOGY

## 2025-07-17 RX ORDER — DEXTROAMPHETAMINE SACCHARATE, AMPHETAMINE ASPARTATE, DEXTROAMPHETAMINE SULFATE AND AMPHETAMINE SULFATE 5; 5; 5; 5 MG/1; MG/1; MG/1; MG/1
20 TABLET ORAL 3 TIMES DAILY
Qty: 90 TABLET | Refills: 0 | Status: SHIPPED | OUTPATIENT
Start: 2025-07-17 | End: 2025-08-16

## 2025-07-17 RX ORDER — CLONIDINE HYDROCHLORIDE 0.1 MG/1
0.1 TABLET ORAL NIGHTLY
Qty: 30 TABLET | Refills: 2 | Status: SHIPPED | OUTPATIENT
Start: 2025-07-17

## 2025-07-17 ASSESSMENT — PATIENT HEALTH QUESTIONNAIRE - PHQ9
3. TROUBLE FALLING OR STAYING ASLEEP: NOT AT ALL
7. TROUBLE CONCENTRATING ON THINGS, SUCH AS READING THE NEWSPAPER OR WATCHING TELEVISION: NOT AT ALL
6. FEELING BAD ABOUT YOURSELF - OR THAT YOU ARE A FAILURE OR HAVE LET YOURSELF OR YOUR FAMILY DOWN: NOT AT ALL
6. FEELING BAD ABOUT YOURSELF - OR THAT YOU ARE A FAILURE OR HAVE LET YOURSELF OR YOUR FAMILY DOWN: NOT AT ALL
SUM OF ALL RESPONSES TO PHQ QUESTIONS 1-9: 0
5. POOR APPETITE OR OVEREATING: NOT AT ALL
SUM OF ALL RESPONSES TO PHQ QUESTIONS 1-9: 0
SUM OF ALL RESPONSES TO PHQ QUESTIONS 1-9: 0
7. TROUBLE CONCENTRATING ON THINGS, SUCH AS READING THE NEWSPAPER OR WATCHING TELEVISION: NOT AT ALL
8. MOVING OR SPEAKING SO SLOWLY THAT OTHER PEOPLE COULD HAVE NOTICED. OR THE OPPOSITE - BEING SO FIDGETY OR RESTLESS THAT YOU HAVE BEEN MOVING AROUND A LOT MORE THAN USUAL: NOT AT ALL
SUM OF ALL RESPONSES TO PHQ QUESTIONS 1-9: 0
SUM OF ALL RESPONSES TO PHQ QUESTIONS 1-9: 0
10. IF YOU CHECKED OFF ANY PROBLEMS, HOW DIFFICULT HAVE THESE PROBLEMS MADE IT FOR YOU TO DO YOUR WORK, TAKE CARE OF THINGS AT HOME, OR GET ALONG WITH OTHER PEOPLE: NOT DIFFICULT AT ALL
4. FEELING TIRED OR HAVING LITTLE ENERGY: NOT AT ALL
4. FEELING TIRED OR HAVING LITTLE ENERGY: NOT AT ALL
1. LITTLE INTEREST OR PLEASURE IN DOING THINGS: NOT AT ALL
1. LITTLE INTEREST OR PLEASURE IN DOING THINGS: NOT AT ALL
9. THOUGHTS THAT YOU WOULD BE BETTER OFF DEAD, OR OF HURTING YOURSELF: NOT AT ALL
5. POOR APPETITE OR OVEREATING: NOT AT ALL
3. TROUBLE FALLING OR STAYING ASLEEP: NOT AT ALL
8. MOVING OR SPEAKING SO SLOWLY THAT OTHER PEOPLE COULD HAVE NOTICED. OR THE OPPOSITE, BEING SO FIGETY OR RESTLESS THAT YOU HAVE BEEN MOVING AROUND A LOT MORE THAN USUAL: NOT AT ALL
2. FEELING DOWN, DEPRESSED OR HOPELESS: NOT AT ALL
2. FEELING DOWN, DEPRESSED OR HOPELESS: NOT AT ALL
10. IF YOU CHECKED OFF ANY PROBLEMS, HOW DIFFICULT HAVE THESE PROBLEMS MADE IT FOR YOU TO DO YOUR WORK, TAKE CARE OF THINGS AT HOME, OR GET ALONG WITH OTHER PEOPLE: NOT DIFFICULT AT ALL
9. THOUGHTS THAT YOU WOULD BE BETTER OFF DEAD, OR OF HURTING YOURSELF: NOT AT ALL

## 2025-07-17 ASSESSMENT — ANXIETY QUESTIONNAIRES
1. FEELING NERVOUS, ANXIOUS, OR ON EDGE: NOT AT ALL
3. WORRYING TOO MUCH ABOUT DIFFERENT THINGS: NOT AT ALL
7. FEELING AFRAID AS IF SOMETHING AWFUL MIGHT HAPPEN: NOT AT ALL
GAD7 TOTAL SCORE: 0
4. TROUBLE RELAXING: NOT AT ALL
6. BECOMING EASILY ANNOYED OR IRRITABLE: NOT AT ALL
3. WORRYING TOO MUCH ABOUT DIFFERENT THINGS: NOT AT ALL
IF YOU CHECKED OFF ANY PROBLEMS ON THIS QUESTIONNAIRE, HOW DIFFICULT HAVE THESE PROBLEMS MADE IT FOR YOU TO DO YOUR WORK, TAKE CARE OF THINGS AT HOME, OR GET ALONG WITH OTHER PEOPLE: NOT DIFFICULT AT ALL
5. BEING SO RESTLESS THAT IT IS HARD TO SIT STILL: NOT AT ALL
5. BEING SO RESTLESS THAT IT IS HARD TO SIT STILL: NOT AT ALL
2. NOT BEING ABLE TO STOP OR CONTROL WORRYING: NOT AT ALL
7. FEELING AFRAID AS IF SOMETHING AWFUL MIGHT HAPPEN: NOT AT ALL
6. BECOMING EASILY ANNOYED OR IRRITABLE: NOT AT ALL
1. FEELING NERVOUS, ANXIOUS, OR ON EDGE: NOT AT ALL
4. TROUBLE RELAXING: NOT AT ALL
2. NOT BEING ABLE TO STOP OR CONTROL WORRYING: NOT AT ALL
IF YOU CHECKED OFF ANY PROBLEMS ON THIS QUESTIONNAIRE, HOW DIFFICULT HAVE THESE PROBLEMS MADE IT FOR YOU TO DO YOUR WORK, TAKE CARE OF THINGS AT HOME, OR GET ALONG WITH OTHER PEOPLE: NOT DIFFICULT AT ALL

## 2025-07-17 NOTE — PROGRESS NOTES
Wellbutrin (helped), clonidine       OBJECTIVE:  Vitals: There were no vitals taken for this visit.    MENTAL STATUS EXAM:    GENERAL  Build: Normal    Hygiene:  Appropriate   SENSORIUM Orientation: Place, Person, Time, & Situation     Consciousness: Alert    ATTENTION   Focused   RELATEDNESS  Cooperative    EYE CONTACT   Good    PSYCHOMOTOR  Normal    SPEECH Volume: Normal     Rate: Normal rate and tone    Amplitude: Within normal limits, talkative   MOOD  \"good\"    AFFECT Range: Full, mood congruent   THOUGHT Process:  Goal-Directed     Content: no evidence of psychosis    COGNITION Insight: Good    Judgement:  Intact    MEMORY  no gross deficits, did not test    INTELLIGENCE  Average         7/17/2025     8:54 AM 5/14/2025     5:11 AM 4/9/2025     9:02 AM   PHQ-9    Little interest or pleasure in doing things 0 0 0   Feeling down, depressed, or hopeless 0 0 0   Trouble falling or staying asleep, or sleeping too much 0 0 0   Feeling tired or having little energy 0 0 0   Poor appetite or overeating 0 0 0   Feeling bad about yourself - or that you are a failure or have let yourself or your family down 0 0 0   Trouble concentrating on things, such as reading the newspaper or watching television 0 0 0   Moving or speaking so slowly that other people could have noticed. Or the opposite - being so fidgety or restless that you have been moving around a lot more than usual 0 0 0   Thoughts that you would be better off dead, or of hurting yourself in some way 0 0 0   PHQ-2 Score 0  0  0    PHQ-9 Total Score 0  0  0    If you checked off any problems, how difficult have these problems made it for you to do your work, take care of things at home, or get along with other people? 0 0 0       Patient-reported         7/17/2025     8:53 AM 5/14/2025     5:10 AM 4/9/2025     9:01 AM   LESA-7 SCREENING   Feeling nervous, anxious, or on edge Not at all Not at all Not at all   Not being able to stop or control worrying Not at all Not

## 2025-08-13 DIAGNOSIS — F90.8 ADHD, ADULT RESIDUAL TYPE: ICD-10-CM

## 2025-08-13 RX ORDER — DEXTROAMPHETAMINE SACCHARATE, AMPHETAMINE ASPARTATE, DEXTROAMPHETAMINE SULFATE AND AMPHETAMINE SULFATE 5; 5; 5; 5 MG/1; MG/1; MG/1; MG/1
20 TABLET ORAL 3 TIMES DAILY
Qty: 90 TABLET | Refills: 0 | Status: SHIPPED | OUTPATIENT
Start: 2025-08-13 | End: 2025-09-12

## 2025-09-05 ENCOUNTER — OFFICE VISIT (OUTPATIENT)
Age: 35
End: 2025-09-05

## 2025-09-05 VITALS
HEART RATE: 113 BPM | BODY MASS INDEX: 19.77 KG/M2 | WEIGHT: 123 LBS | HEIGHT: 66 IN | RESPIRATION RATE: 20 BRPM | SYSTOLIC BLOOD PRESSURE: 140 MMHG | DIASTOLIC BLOOD PRESSURE: 92 MMHG

## 2025-09-05 DIAGNOSIS — Z87.891 PERSONAL HISTORY OF TOBACCO USE: ICD-10-CM

## 2025-09-05 DIAGNOSIS — F11.20 SEVERE OPIOID USE DISORDER (HCC): Primary | ICD-10-CM

## 2025-09-05 DIAGNOSIS — F90.2 ATTENTION DEFICIT HYPERACTIVITY DISORDER, COMBINED TYPE: ICD-10-CM

## 2025-09-05 DIAGNOSIS — F51.04 PSYCHOPHYSIOLOGICAL INSOMNIA: ICD-10-CM

## 2025-09-05 RX ORDER — BUPRENORPHINE AND NALOXONE 8; 2 MG/1; MG/1
1 FILM, SOLUBLE BUCCAL; SUBLINGUAL 2 TIMES DAILY
Qty: 60 FILM | Refills: 0 | Status: SHIPPED | OUTPATIENT
Start: 2025-09-05 | End: 2025-10-05

## 2025-09-05 RX ORDER — BUPRENORPHINE AND NALOXONE 8; 2 MG/1; MG/1
1 FILM, SOLUBLE BUCCAL; SUBLINGUAL ONCE
Status: COMPLETED | OUTPATIENT
Start: 2025-09-05 | End: 2025-09-05

## 2025-09-05 RX ADMIN — BUPRENORPHINE AND NALOXONE 1 FILM: 8; 2 FILM, SOLUBLE BUCCAL; SUBLINGUAL at 11:00

## 2025-09-05 SDOH — HEALTH STABILITY: MENTAL HEALTH: DRINKING ALCOHOL: 0

## 2025-09-05 SDOH — HEALTH STABILITY: MENTAL HEALTH: CONTACT WITH SUBSTANCE: 1

## 2025-09-05 ASSESSMENT — ENCOUNTER SYMPTOMS
DIARRHEA: 0
VOMITING: 0
ABDOMINAL PAIN: 0
CONSTIPATION: 0
SHORTNESS OF BREATH: 0
COUGH: 0
BLOOD IN STOOL: 0
PHOTOPHOBIA: 0
NAUSEA: 0
WHEEZING: 0

## 2025-09-05 ASSESSMENT — LIFESTYLE VARIABLES: CRAVINGS: 1
